# Patient Record
Sex: FEMALE | Race: WHITE | Employment: PART TIME | URBAN - METROPOLITAN AREA
[De-identification: names, ages, dates, MRNs, and addresses within clinical notes are randomized per-mention and may not be internally consistent; named-entity substitution may affect disease eponyms.]

---

## 2017-05-22 ENCOUNTER — OFFICE VISIT (OUTPATIENT)
Dept: FAMILY MEDICINE | Facility: CLINIC | Age: 47
End: 2017-05-22
Payer: COMMERCIAL

## 2017-05-22 VITALS
WEIGHT: 170.3 LBS | TEMPERATURE: 98 F | DIASTOLIC BLOOD PRESSURE: 90 MMHG | HEART RATE: 68 BPM | RESPIRATION RATE: 16 BRPM | BODY MASS INDEX: 25.22 KG/M2 | SYSTOLIC BLOOD PRESSURE: 130 MMHG | HEIGHT: 69 IN | OXYGEN SATURATION: 100 %

## 2017-05-22 DIAGNOSIS — I10 HYPERTENSION GOAL BP (BLOOD PRESSURE) < 140/90: Primary | ICD-10-CM

## 2017-05-22 DIAGNOSIS — E83.51 HYPOCALCEMIA: ICD-10-CM

## 2017-05-22 PROCEDURE — 99213 OFFICE O/P EST LOW 20 MIN: CPT | Performed by: FAMILY MEDICINE

## 2017-05-22 PROCEDURE — 36415 COLL VENOUS BLD VENIPUNCTURE: CPT | Performed by: FAMILY MEDICINE

## 2017-05-22 PROCEDURE — 80048 BASIC METABOLIC PNL TOTAL CA: CPT | Performed by: FAMILY MEDICINE

## 2017-05-22 RX ORDER — LISINOPRIL 5 MG/1
5 TABLET ORAL DAILY
Qty: 30 TABLET | Refills: 0 | Status: SHIPPED | OUTPATIENT
Start: 2017-05-22 | End: 2017-06-19

## 2017-05-22 ASSESSMENT — ANXIETY QUESTIONNAIRES
IF YOU CHECKED OFF ANY PROBLEMS ON THIS QUESTIONNAIRE, HOW DIFFICULT HAVE THESE PROBLEMS MADE IT FOR YOU TO DO YOUR WORK, TAKE CARE OF THINGS AT HOME, OR GET ALONG WITH OTHER PEOPLE: NOT DIFFICULT AT ALL
2. NOT BEING ABLE TO STOP OR CONTROL WORRYING: NOT AT ALL
GAD7 TOTAL SCORE: 3
6. BECOMING EASILY ANNOYED OR IRRITABLE: MORE THAN HALF THE DAYS
3. WORRYING TOO MUCH ABOUT DIFFERENT THINGS: NOT AT ALL
7. FEELING AFRAID AS IF SOMETHING AWFUL MIGHT HAPPEN: NOT AT ALL
5. BEING SO RESTLESS THAT IT IS HARD TO SIT STILL: NOT AT ALL
1. FEELING NERVOUS, ANXIOUS, OR ON EDGE: SEVERAL DAYS

## 2017-05-22 ASSESSMENT — PATIENT HEALTH QUESTIONNAIRE - PHQ9: 5. POOR APPETITE OR OVEREATING: NOT AT ALL

## 2017-05-22 NOTE — MR AVS SNAPSHOT
"              After Visit Summary   5/22/2017    Nanette Burnett    MRN: [id number]           Patient Information     Date Of Birth          1970        Visit Information        Provider Department      5/22/2017 3:10 PM Becca Trevizo MD [facility]        Today's Diagnoses     Hypertension goal BP (blood pressure) < 140/90    -  1      Care Instructions    Let's see you again in about 3 weeks. Contact me earlier for contacts or concerns.            Follow-ups after your visit        Who to contact     If you have questions or need follow up information about today's clinic visit or your schedule please contact [facility] directly at [phone].  Normal or non-critical lab and imaging results will be communicated to you by MyChart, letter or phone within 4 business days after the clinic has received the results. If you do not hear from us within 7 days, please contact the clinic through [employer] or phone. If you have a critical or abnormal lab result, we will notify you by phone as soon as possible.  Submit refill requests through [employer] or call your pharmacy and they will forward the refill request to us. Please allow 3 business days for your refill to be completed.          Additional Information About Your Visit        MyChart Information     [employer] gives you secure access to your electronic health record. If you see a primary care provider, you can also send messages to your care team and make appointments. If you have questions, please call your primary care clinic.  If you do not have a primary care provider, please call [phone] and they will assist you.        Care EveryWhere ID     This is your Care EveryWhere ID. This could be used by other organizations to access your [facility] medical records  [id number]        Your Vitals Were     Pulse Temperature Respirations Height Pulse Oximetry BMI (Body Mass Index)    68 98  F (36.7  C) (Oral) 16 5' 9\" (1.753 m) 100% 25.15 " kg/m2       Blood Pressure from Last 3 Encounters:   05/22/17 130/90   11/15/16 128/86   10/07/16 130/90    Weight from Last 3 Encounters:   05/22/17 170 lb 4.8 oz (77.2 kg)   10/07/16 172 lb 3.2 oz (78.1 kg)   06/01/16 166 lb (75.3 kg)              We Performed the Following     Basic metabolic panel          Today's Medication Changes          These changes are accurate as of: 5/22/17  3:52 PM.  If you have any questions, ask your nurse or doctor.               Start taking these medicines.        Dose/Directions    lisinopril 5 MG tablet   Commonly known as:  PRINIVIL/ZESTRIL   Used for:  Hypertension goal BP (blood pressure) < 140/90   Started by:  Becca Trevizo MD        Dose:  5 mg   Take 1 tablet (5 mg) by mouth daily   Quantity:  30 tablet   Refills:  0            Where to get your medicines      These medications were sent to [facility] [address] [place] [address] AT [facility] & [address]  [address] [facility] [address]     Phone:  [phone]     lisinopril 5 MG tablet                Primary Care Provider Office Phone # Fax #    Becca Trevizo [phone]       [facility] [address] [facility] [address]        Thank you!     Thank you for choosing [facility]  for your care. Our goal is always to provide you with excellent care. Hearing back from our patients is one way we can continue to improve our services. Please take a few minutes to complete the written survey that you may receive in the mail after your visit with us. Thank you!             Your Updated Medication List - Protect others around you: Learn how to safely use, store and throw away your medicines at www.disposemymeds.org.          This list is accurate as of: 5/22/17  3:52 PM.  Always use your most recent med list.                   Brand Name Dispense Instructions for use    ALLEGRA ALLERGY 180 MG tablet   Generic drug:  fexofenadine      Take  by mouth daily Reported on 5/22/2017       CRANBERRY PO      Take by mouth daily       FLUoxetine 20 MG capsule    PROZAC    90 capsule    Take 1 capsule (20 mg) by mouth daily       lisinopril 5 MG tablet    PRINIVIL/ZESTRIL    30 tablet    Take 1 tablet (5 mg) by mouth daily       phenazopyridine 200 MG tablet    PYRIDIUM     as needed       sulfamethoxazole-trimethoprim 800-160 MG per tablet    BACTRIM DS/SEPTRA DS    10 tablet    Take 1 tablet by mouth daily as needed Use after intercourse       SUMAtriptan 50 MG tablet    IMITREX    30 tablet    Take 1 tablet (50 mg) by mouth at onset of headache for migraine May repeat dose in 2 hours.  Do not exceed 200 mg in 24 hours       valACYclovir 500 MG tablet    VALTREX    24 tablet    Take 1 tablet (500 mg) by mouth 2 times daily as needed X 3 days with flare.

## 2017-05-22 NOTE — PROGRESS NOTES
SUBJECTIVE:                                                    Nanette Burnett is a 46 year old female who presents to clinic today for the following health issues:      Here to discuss higher blood pressure reading concerns.  Blood pressures have been running 127-154/.  Denies any symptoms.        Problem list and histories reviewed & adjusted, as indicated.  Additional history:   See under ROS     Patient Active Problem List   Diagnosis     Herpes simplex virus (HSV) infection     Anxiety state     Allergic state     External hemorrhoids     CARDIOVASCULAR SCREENING; LDL GOAL LESS THAN 160     Dysplasia of cervix     Migraine     Health Care Home     Anemia, unspecified type       Current Outpatient Prescriptions   Medication Sig Dispense Refill     SUMAtriptan (IMITREX) 50 MG tablet Take 1 tablet (50 mg) by mouth at onset of headache for migraine May repeat dose in 2 hours.  Do not exceed 200 mg in 24 hours 30 tablet 0     FLUoxetine (PROZAC) 20 MG capsule Take 1 capsule (20 mg) by mouth daily 90 capsule 1     valACYclovir (VALTREX) 500 MG tablet Take 1 tablet (500 mg) by mouth 2 times daily as needed X 3 days with flare. 24 tablet prn     CRANBERRY PO Take by mouth daily       sulfamethoxazole-trimethoprim (BACTRIM DS,SEPTRA DS) 800-160 MG per tablet Take 1 tablet by mouth daily as needed Use after intercourse 10 tablet 3     phenazopyridine (PYRIDIUM) 200 MG tablet as needed        fexofenadine (ALLEGRA ALLERGY) 180 MG tablet Take by mouth daily Reported on 5/22/2017               Reviewed and updated as needed this visit by clinical staff  Tobacco  Allergies  Meds  Med Hx  Surg Hx  Fam Hx  Soc Hx      Reviewed and updated as needed this visit by Provider         ROS:  CONSTITUTIONAL:NEGATIVE for fever, chills, change in weight  CV: NEGATIVE for chest pain, palpitations or peripheral edema  PSYCHIATRIC: NEGATIVE for changes in mood or affect    bp was noted to be 154/104 at an appointment  So she  "started tracking over the last couple months.      OBJECTIVE:                                                    /90 (BP Location: Right arm, Patient Position: Chair, Cuff Size: Adult Regular)  Pulse 68  Temp 98  F (36.7  C) (Oral)  Resp 16  Ht 5' 9\" (1.753 m)  Wt 170 lb 4.8 oz (77.2 kg)  SpO2 100%  BMI 25.15 kg/m2  Body mass index is 25.15 kg/(m^2).  GENERAL APPEARANCE: healthy, alert and no distress  CV: regular rates and rhythm  MS: extremities normal- no gross deformities noted  PSYCH: mentation appears normal and affect normal/bright    Hemoglobin   Date Value Ref Range Status   10/07/2016 12.6 11.7 - 15.7 g/dL Final   ]         ASSESSMENT/PLAN:                                                      Hypertension goal BP (blood pressure) < 140/90  New diagnosis.  Discussed treatment with what she has noted as well. Discussed potential lifestyle.  Discussed potential side effects, including cough and rare occurrence of angioedema. Will monitor kidney function and potassium.  - Basic metabolic panel  - lisinopril (PRINIVIL/ZESTRIL) 5 MG tablet; Take 1 tablet (5 mg) by mouth daily  - Renal panel (Alb, BUN, Ca, Cl, CO2, Creat, Gluc, Phos, K, Na); Future    Hypocalcemia  Will recheck with albumin to see if significant.  - Renal panel (Alb, BUN, Ca, Cl, CO2, Creat, Gluc, Phos, K, Na); Future      Patient Instructions   Let's see you again in about 3 weeks. Contact me earlier for contacts or concerns.          Becca Trevizo MD, MD  [facility]  "

## 2017-05-22 NOTE — NURSING NOTE
"Chief Complaint   Patient presents with     Hypertension       Initial /90 (BP Location: Right arm, Patient Position: Chair, Cuff Size: Adult Regular)  Pulse 68  Temp 98  F (36.7  C) (Oral)  Resp 16  Ht 5' 9\" (1.753 m)  Wt 170 lb 4.8 oz (77.2 kg)  SpO2 100%  BMI 25.15 kg/m2 Estimated body mass index is 25.15 kg/(m^2) as calculated from the following:    Height as of this encounter: 5' 9\" (1.753 m).    Weight as of this encounter: 170 lb 4.8 oz (77.2 kg).  Medication Reconciliation: complete   Maine Metz, BRYAN      "

## 2017-05-23 LAB
ANION GAP SERPL CALCULATED.3IONS-SCNC: 5 MMOL/L (ref 3–14)
BUN SERPL-MCNC: 13 MG/DL (ref 7–30)
CALCIUM SERPL-MCNC: 8.4 MG/DL (ref 8.5–10.1)
CHLORIDE SERPL-SCNC: 108 MMOL/L (ref 94–109)
CO2 SERPL-SCNC: 28 MMOL/L (ref 20–32)
CREAT SERPL-MCNC: 0.6 MG/DL (ref 0.52–1.04)
GFR SERPL CREATININE-BSD FRML MDRD: ABNORMAL ML/MIN/1.7M2
GLUCOSE SERPL-MCNC: 85 MG/DL (ref 70–99)
POTASSIUM SERPL-SCNC: 3.8 MMOL/L (ref 3.4–5.3)
SODIUM SERPL-SCNC: 141 MMOL/L (ref 133–144)

## 2017-05-23 ASSESSMENT — ANXIETY QUESTIONNAIRES: GAD7 TOTAL SCORE: 3

## 2017-05-23 ASSESSMENT — PATIENT HEALTH QUESTIONNAIRE - PHQ9: SUM OF ALL RESPONSES TO PHQ QUESTIONS 1-9: 2

## 2017-06-19 ENCOUNTER — OFFICE VISIT (OUTPATIENT)
Dept: FAMILY MEDICINE | Facility: CLINIC | Age: 47
End: 2017-06-19
Payer: COMMERCIAL

## 2017-06-19 VITALS
SYSTOLIC BLOOD PRESSURE: 132 MMHG | TEMPERATURE: 98 F | RESPIRATION RATE: 16 BRPM | DIASTOLIC BLOOD PRESSURE: 80 MMHG | OXYGEN SATURATION: 99 % | BODY MASS INDEX: 25 KG/M2 | WEIGHT: 168.8 LBS | HEIGHT: 69 IN | HEART RATE: 71 BPM

## 2017-06-19 DIAGNOSIS — E83.51 HYPOCALCEMIA: ICD-10-CM

## 2017-06-19 DIAGNOSIS — I10 HYPERTENSION GOAL BP (BLOOD PRESSURE) < 140/90: ICD-10-CM

## 2017-06-19 LAB
ALBUMIN SERPL-MCNC: 3.7 G/DL (ref 3.4–5)
ANION GAP SERPL CALCULATED.3IONS-SCNC: 8 MMOL/L (ref 3–14)
BUN SERPL-MCNC: 14 MG/DL (ref 7–30)
CALCIUM SERPL-MCNC: 8.5 MG/DL (ref 8.5–10.1)
CHLORIDE SERPL-SCNC: 110 MMOL/L (ref 94–109)
CO2 SERPL-SCNC: 28 MMOL/L (ref 20–32)
CREAT SERPL-MCNC: 0.88 MG/DL (ref 0.52–1.04)
GFR SERPL CREATININE-BSD FRML MDRD: 69 ML/MIN/1.7M2
GLUCOSE SERPL-MCNC: 89 MG/DL (ref 70–99)
PHOSPHATE SERPL-MCNC: 3.6 MG/DL (ref 2.5–4.5)
POTASSIUM SERPL-SCNC: 3.8 MMOL/L (ref 3.4–5.3)
SODIUM SERPL-SCNC: 146 MMOL/L (ref 133–144)

## 2017-06-19 PROCEDURE — 99213 OFFICE O/P EST LOW 20 MIN: CPT | Performed by: FAMILY MEDICINE

## 2017-06-19 PROCEDURE — 80069 RENAL FUNCTION PANEL: CPT | Performed by: FAMILY MEDICINE

## 2017-06-19 PROCEDURE — 36415 COLL VENOUS BLD VENIPUNCTURE: CPT | Performed by: FAMILY MEDICINE

## 2017-06-19 RX ORDER — LISINOPRIL 5 MG/1
5 TABLET ORAL DAILY
Qty: 90 TABLET | Refills: 1 | Status: SHIPPED | OUTPATIENT
Start: 2017-06-19 | End: 2017-09-05 | Stop reason: DRUGHIGH

## 2017-06-19 NOTE — LETTER
June 20, 2017      Nanette Burnett  [address] [place] [address]        Dear Ms. Nanette DAVID Burnett,    Enclosed is a copy of your recent results.    I do think this is fine. The sodium and chloride can vary some; these are not far out of the normal. I am not concerned; we will continue to follow.    Have a great Summer!    Sincerely,     Becca Trevizo MD

## 2017-06-19 NOTE — PROGRESS NOTES
SUBJECTIVE:                                                    Nanette Burnett is a 46 year old female who presents to clinic today for the following health issues:      Hypertension Follow-up      Outpatient blood pressures  At [facility] - averages 120-128/86-90    Low Salt Diet: no added salt       Amount of exercise or physical activity: 5 days per week - wallking    Problems taking medications regularly: No    Medication side effects: dry mouth, lathargic    Diet: regular (no restrictions)          Problem list and histories reviewed & adjusted, as indicated.  Additional history:   See under ROS     Patient Active Problem List   Diagnosis     Herpes simplex virus (HSV) infection     Anxiety state     Allergic state     External hemorrhoids     CARDIOVASCULAR SCREENING; LDL GOAL LESS THAN 160     Dysplasia of cervix     Migraine     Health Care Home     Anemia, unspecified type     Hypertension goal BP (blood pressure) < 140/90       Current Outpatient Prescriptions   Medication Sig Dispense Refill     lisinopril (PRINIVIL/ZESTRIL) 5 MG tablet Take 1 tablet (5 mg) by mouth daily 30 tablet 0     SUMAtriptan (IMITREX) 50 MG tablet Take 1 tablet (50 mg) by mouth at onset of headache for migraine May repeat dose in 2 hours.  Do not exceed 200 mg in 24 hours 30 tablet 0     FLUoxetine (PROZAC) 20 MG capsule Take 1 capsule (20 mg) by mouth daily 90 capsule 1     valACYclovir (VALTREX) 500 MG tablet Take 1 tablet (500 mg) by mouth 2 times daily as needed X 3 days with flare. 24 tablet prn     CRANBERRY PO Take by mouth daily       sulfamethoxazole-trimethoprim (BACTRIM DS,SEPTRA DS) 800-160 MG per tablet Take 1 tablet by mouth daily as needed Use after intercourse 10 tablet 3     phenazopyridine (PYRIDIUM) 200 MG tablet as needed        fexofenadine (ALLEGRA ALLERGY) 180 MG tablet Take by mouth daily Reported on 5/22/2017             Reviewed and updated as needed this visit by clinical staff  Allergies       Reviewed and  "updated as needed this visit by Provider         ROS:  CONSTITUTIONAL:NEGATIVE for fever, chills, change in weight  ENT/MOUTH: NEGATIVE for ear, mouth and throat problems x dry mouth.  RESP:NEGATIVE for significant cough or short of breath; notes she did have a cough the first couple days.   CV: no palpitations or chest pain.  PSYCHIATRIC: NEGATIVE for changes in mood or affect    Notes a dry mouth.      OBJECTIVE:                                                    /80 (BP Location: Right arm, Patient Position: Chair, Cuff Size: Adult Regular)  Pulse 71  Temp 98  F (36.7  C) (Oral)  Resp 16  Ht 5' 9\" (1.753 m)  Wt 168 lb 12.8 oz (76.6 kg)  SpO2 99%  BMI 24.93 kg/m2  Body mass index is 24.93 kg/(m^2).  GENERAL APPEARANCE: alert and no distress  RESP: lungs clear to auscultation - no rales, rhonchi or wheezes  CV: regular rates and rhythm  MS: extremities normal- no gross deformities noted; no edema.  PSYCH: mentation appears normal and affect normal/bright    Component      Latest Ref Rng & Units 5/22/2017   Sodium      133 - 144 mmol/L 141   Potassium      3.4 - 5.3 mmol/L 3.8   Chloride      94 - 109 mmol/L 108   Carbon Dioxide      20 - 32 mmol/L 28   Anion Gap      3 - 14 mmol/L 5   Glucose      70 - 99 mg/dL 85   Urea Nitrogen      7 - 30 mg/dL 13   Creatinine      0.52 - 1.04 mg/dL 0.60   GFR Estimate      >60 mL/min/1.7m2 >90 . . .   GFR Estimate If Black      >60 mL/min/1.7m2 >90 . . .   Calcium      8.5 - 10.1 mg/dL 8.4 (L)        ASSESSMENT/PLAN:                                                        Hypertension goal BP (blood pressure) < 140/90  Controlled. Will recheck in 6 months. She can come in sooner if she notes her readings to be elevated.   - lisinopril (PRINIVIL/ZESTRIL) 5 MG tablet; Take 1 tablet (5 mg) by mouth daily  - Renal panel (Alb, BUN, Ca, Cl, CO2, Creat, Gluc, Phos, K, Na)    Hypocalcemia  Discussed this is most likely normal. Discussed it is protein bound. Will check with " albumin so we can do correction if abnormal.   Did review it does not reflect dietary intake.  - Renal panel (Alb, BUN, Ca, Cl, CO2, Creat, Gluc, Phos, K, Na)        Patient Instructions   If all is looking good, I would like to see you again in 6 months. Earlier for concerns.          Becca Trevizo MD, MD  [facility]

## 2017-06-19 NOTE — MR AVS SNAPSHOT
"              After Visit Summary   6/19/2017    Nanette Burnett    MRN: [id number]           Patient Information     Date Of Birth          1970        Visit Information        Provider Department      6/19/2017 2:50 PM Becca Trevizo MD [facility]        Today's Diagnoses     Hypertension goal BP (blood pressure) < 140/90        Hypocalcemia          Care Instructions    If all is looking good, I would like to see you again in 6 months. Earlier for concerns.              Follow-ups after your visit        Who to contact     If you have questions or need follow up information about today's clinic visit or your schedule please contact [facility] directly at [phone].  Normal or non-critical lab and imaging results will be communicated to you by MyChart, letter or phone within 4 business days after the clinic has received the results. If you do not hear from us within 7 days, please contact the clinic through MyChart or phone. If you have a critical or abnormal lab result, we will notify you by phone as soon as possible.  Submit refill requests through [employer] or call your pharmacy and they will forward the refill request to us. Please allow 3 business days for your refill to be completed.          Additional Information About Your Visit        Care EveryWhere ID     This is your Care EveryWhere ID. This could be used by other organizations to access your [facility] medical records  [id number]        Your Vitals Were     Pulse Temperature Respirations Height Pulse Oximetry BMI (Body Mass Index)    71 98  F (36.7  C) (Oral) 16 5' 9\" (1.753 m) 99% 24.93 kg/m2       Blood Pressure from Last 3 Encounters:   06/19/17 132/80   05/22/17 130/90   11/15/16 128/86    Weight from Last 3 Encounters:   06/19/17 168 lb 12.8 oz (76.6 kg)   05/22/17 170 lb 4.8 oz (77.2 kg)   10/07/16 172 lb 3.2 oz (78.1 kg)              We Performed the Following     Renal panel (Alb, BUN, Ca, Cl, CO2, Creat, " Gluc, Phos, K, Na)          Where to get your medicines      These medications were sent to [facility] - [place], [place] - [address] AT [facility] [place] [address]  [address], [facility] [address]     Phone:  [phone]     lisinopril 5 MG tablet          Primary Care Provider Office Phone # Fax #    Becca Trevizo [phone]       [facility] [address]  [facility] [address]        Thank you!     Thank you for choosing [facility]  for your care. Our goal is always to provide you with excellent care. Hearing back from our patients is one way we can continue to improve our services. Please take a few minutes to complete the written survey that you may receive in the mail after your visit with us. Thank you!             Your Updated Medication List - Protect others around you: Learn how to safely use, store and throw away your medicines at www.disposemymeds.org.          This list is accurate as of: 6/19/17  3:21 PM.  Always use your most recent med list.                   Brand Name Dispense Instructions for use    ALLEGRA ALLERGY 180 MG tablet   Generic drug:  fexofenadine      Take by mouth daily Reported on 5/22/2017       CRANBERRY PO      Take by mouth daily       FLUoxetine 20 MG capsule    PROZAC    90 capsule    Take 1 capsule (20 mg) by mouth daily       lisinopril 5 MG tablet    PRINIVIL/ZESTRIL    90 tablet    Take 1 tablet (5 mg) by mouth daily       phenazopyridine 200 MG tablet    PYRIDIUM     as needed       sulfamethoxazole-trimethoprim 800-160 MG per tablet    BACTRIM DS/SEPTRA DS    10 tablet    Take 1 tablet by mouth daily as needed Use after intercourse       SUMAtriptan 50 MG tablet    IMITREX    30 tablet    Take 1 tablet (50 mg) by mouth at onset of headache for migraine May repeat dose in 2 hours.  Do not exceed 200 mg in 24 hours       valACYclovir 500 MG tablet    VALTREX    24 tablet    Take 1 tablet (500  mg) by mouth 2 times daily as needed X 3 days with flare.

## 2017-06-19 NOTE — NURSING NOTE
"Chief Complaint   Patient presents with     Hypertension       Initial /80 (BP Location: Right arm, Patient Position: Chair, Cuff Size: Adult Regular)  Pulse 71  Temp 98  F (36.7  C) (Oral)  Resp 16  Ht 5' 9\" (1.753 m)  Wt 168 lb 12.8 oz (76.6 kg)  SpO2 99%  BMI 24.93 kg/m2 Estimated body mass index is 24.93 kg/(m^2) as calculated from the following:    Height as of this encounter: 5' 9\" (1.753 m).    Weight as of this encounter: 168 lb 12.8 oz (76.6 kg).  Medication Reconciliation: complete   Maine Metz, BRYAN      "

## 2017-08-10 DIAGNOSIS — N39.0 FREQUENT UTI: ICD-10-CM

## 2017-08-11 RX ORDER — SULFAMETHOXAZOLE/TRIMETHOPRIM 800-160 MG
TABLET ORAL
Qty: 10 TABLET | Refills: 0 | Status: SHIPPED | OUTPATIENT
Start: 2017-08-11 | End: 2018-08-02

## 2017-08-11 NOTE — TELEPHONE ENCOUNTER
sulfamethoxazole-trimethoprim (BACTRIM DS,SEPTRA DS) 800-160 MG per tablet      Last Written Prescription Date:  6/1/2016  Last Fill Quantity: 10,   # refills: 3  Last Office Visit with [facility] or [facility] prescribing provider: 6/19/2017  Future Office visit:       Routing refill request to provider for review/approval because:  Drug not on the [facility], [facility] or  [facility] refill protocol or controlled substance

## 2017-08-11 NOTE — TELEPHONE ENCOUNTER
Routing refill request to provider for review/approval because:  Diagnosis is not per protocol, patient takes for frequent UTI.  Yaima Martinez, SAJI  Triage Nurse

## 2017-09-05 ENCOUNTER — OFFICE VISIT (OUTPATIENT)
Dept: FAMILY MEDICINE | Facility: CLINIC | Age: 47
End: 2017-09-05
Payer: COMMERCIAL

## 2017-09-05 VITALS
RESPIRATION RATE: 16 BRPM | HEIGHT: 69 IN | DIASTOLIC BLOOD PRESSURE: 98 MMHG | TEMPERATURE: 97.9 F | SYSTOLIC BLOOD PRESSURE: 152 MMHG | OXYGEN SATURATION: 100 % | BODY MASS INDEX: 25.48 KG/M2 | WEIGHT: 172 LBS | HEART RATE: 69 BPM

## 2017-09-05 DIAGNOSIS — I10 HYPERTENSION GOAL BP (BLOOD PRESSURE) < 140/90: Primary | ICD-10-CM

## 2017-09-05 PROCEDURE — 99213 OFFICE O/P EST LOW 20 MIN: CPT | Performed by: FAMILY MEDICINE

## 2017-09-05 RX ORDER — LISINOPRIL 10 MG/1
10 TABLET ORAL DAILY
Qty: 90 TABLET | Refills: 0 | Status: SHIPPED | OUTPATIENT
Start: 2017-09-05 | End: 2017-12-15

## 2017-09-05 NOTE — MR AVS SNAPSHOT
"              After Visit Summary   9/5/2017    Nanette Burnett    MRN: [id number]           Patient Information     Date Of Birth          1970        Visit Information        Provider Department      9/5/2017 3:30 PM Becca Trevizo MD [facility]        Today's Diagnoses     Hypertension goal BP (blood pressure) < 140/90    -  1      Care Instructions    Increase lisinopril to 10 mg daily.    You can take 2 of your current medication to use them up.  I did send in a 10 mg pill.    I would like to see you in 3-4 weeks.          Follow-ups after your visit        Follow-up notes from your care team     Return in about 4 weeks (around 10/3/2017).      Who to contact     If you have questions or need follow up information about today's clinic visit or your schedule please contact [facility] directly at [phone].  Normal or non-critical lab and imaging results will be communicated to you by MyChart, letter or phone within 4 business days after the clinic has received the results. If you do not hear from us within 7 days, please contact the clinic through MyChart or phone. If you have a critical or abnormal lab result, we will notify you by phone as soon as possible.  Submit refill requests through MyTinks or call your pharmacy and they will forward the refill request to us. Please allow 3 business days for your refill to be completed.          Additional Information About Your Visit        Care EveryWhere ID     This is your Care EveryWhere ID. This could be used by other organizations to access your [place] medical records  [id number]        Your Vitals Were     Pulse Temperature Respirations Height Pulse Oximetry BMI (Body Mass Index)    69 97.9  F (36.6  C) (Oral) 16 5' 9\" (1.753 m) 100% 25.4 kg/m2       Blood Pressure from Last 3 Encounters:   09/05/17 (!) 152/98   06/19/17 132/80   05/22/17 130/90    Weight from Last 3 Encounters:   09/05/17 172 lb (78 kg)   06/19/17 168 lb " 12.8 oz (76.6 kg)   05/22/17 170 lb 4.8 oz (77.2 kg)              Today, you had the following     No orders found for display         Today's Medication Changes          These changes are accurate as of: 9/5/17  4:56 PM.  If you have any questions, ask your nurse or doctor.               These medicines have changed or have updated prescriptions.        Dose/Directions    lisinopril 10 MG tablet   Commonly known as:  PRINIVIL/ZESTRIL   This may have changed:    - medication strength  - how much to take   Used for:  Hypertension goal BP (blood pressure) < 140/90   Changed by:  Becca Trevizo MD        Dose:  10 mg   Take 1 tablet (10 mg) by mouth daily   Quantity:  90 tablet   Refills:  0            Where to get your medicines      These medications were sent to [facility] [id number] [place] [address] [address]  [facility] [address]  [address] [facility] [address]     Phone:  [phone]     lisinopril 10 MG tablet                Primary Care Provider Office Phone # Fax #    Becca Trevizo [phone]       [address] [facility] [address]        Equal Access to Services     RENAE BRIGGS AH: Hadii aad ku hadasho Soomaali, waaxda luqadaha, qaybta kaalmada adeegyada, waxay idiin hayaan lcayton velazquez. So [facility] [phone].    ATENCIÓN: Si habla español, tiene a garcia disposición servicios gratuitos de asistencia lingüística. Llame al [phone].    We comply with applicable federal civil rights laws and [place] laws. We do not discriminate on the basis of race, color, national origin, age, disability sex, sexual orientation or gender identity.            Thank you!     Thank you for choosing [facility]  for your care. Our goal is always to provide you with excellent care. Hearing back from our patients is one way we can continue to improve our services. Please take a few minutes to complete the written survey that you may receive in the mail  after your visit with us. Thank you!             Your Updated Medication List - Protect others around you: Learn how to safely use, store and throw away your medicines at www.disposemymeds.org.          This list is accurate as of: 9/5/17  4:56 PM.  Always use your most recent med list.                   Brand Name Dispense Instructions for use Diagnosis    ALLEGRA ALLERGY 180 MG tablet   Generic drug:  fexofenadine      Take by mouth daily Reported on 5/22/2017        CRANBERRY PO      Take by mouth daily        FLUoxetine 20 MG capsule    PROZAC    90 capsule    Take 1 capsule (20 mg) by mouth daily    Anxiety state       lisinopril 10 MG tablet    PRINIVIL/ZESTRIL    90 tablet    Take 1 tablet (10 mg) by mouth daily    Hypertension goal BP (blood pressure) < 140/90       phenazopyridine 200 MG tablet    PYRIDIUM     as needed        sulfamethoxazole-trimethoprim 800-160 MG per tablet    BACTRIM DS/SEPTRA DS    10 tablet    TAKE 1 TABLET BY MOUTH ONCE DAILY AS NEEDED FOR AFTER INTERCOURSE.    Frequent UTI       SUMAtriptan 50 MG tablet    IMITREX    30 tablet    Take 1 tablet (50 mg) by mouth at onset of headache for migraine May repeat dose in 2 hours.  Do not exceed 200 mg in 24 hours    Migraine with aura and without status migrainosus, not intractable       valACYclovir 500 MG tablet    VALTREX    24 tablet    Take 1 tablet (500 mg) by mouth 2 times daily as needed X 3 days with flare.    Herpes simplex virus (HSV) infection

## 2017-09-05 NOTE — NURSING NOTE
"Chief Complaint   Patient presents with     Hypertension     high reading       Initial BP (!) 152/98 (BP Location: Right arm, Cuff Size: Adult Regular)  Pulse 69  Temp 97.9  F (36.6  C) (Oral)  Resp 16  Ht 5' 9\" (1.753 m)  Wt 172 lb (78 kg)  SpO2 100%  BMI 25.4 kg/m2 Estimated body mass index is 25.4 kg/(m^2) as calculated from the following:    Height as of this encounter: 5' 9\" (1.753 m).    Weight as of this encounter: 172 lb (78 kg).  Medication Reconciliation: complete   Maine Metz, BRYAN    "

## 2017-09-05 NOTE — PATIENT INSTRUCTIONS
Increase lisinopril to 10 mg daily.    You can take 2 of your current medication to use them up.  I did send in a 10 mg pill.    I would like to see you in 3-4 weeks.

## 2017-11-27 ENCOUNTER — ALLIED HEALTH/NURSE VISIT (OUTPATIENT)
Dept: NURSING | Facility: CLINIC | Age: 47
End: 2017-11-27
Payer: COMMERCIAL

## 2017-11-27 ENCOUNTER — RADIANT APPOINTMENT (OUTPATIENT)
Dept: MAMMOGRAPHY | Facility: CLINIC | Age: 47
End: 2017-11-27
Payer: COMMERCIAL

## 2017-11-27 DIAGNOSIS — Z23 NEED FOR PROPHYLACTIC VACCINATION AND INOCULATION AGAINST INFLUENZA: Primary | ICD-10-CM

## 2017-11-27 DIAGNOSIS — Z12.31 VISIT FOR SCREENING MAMMOGRAM: ICD-10-CM

## 2017-11-27 PROCEDURE — G0202 SCR MAMMO BI INCL CAD: HCPCS | Mod: TC

## 2017-11-27 PROCEDURE — 99207 ZZC NO CHARGE NURSE ONLY: CPT

## 2017-11-27 PROCEDURE — 90686 IIV4 VACC NO PRSV 0.5 ML IM: CPT

## 2017-11-27 PROCEDURE — 90471 IMMUNIZATION ADMIN: CPT

## 2017-11-27 NOTE — PROGRESS NOTES

## 2017-11-27 NOTE — MR AVS SNAPSHOT
"              After Visit Summary   2017    Nanette Burnett    MRN: [id number]           Patient Information     Date Of Birth          1970        Visit Information        Provider Department      2017 9:45 AM CR GOLD MA/LPN [facility]        Today's Diagnoses     Need for prophylactic vaccination and inoculation against influenza    -  1       Follow-ups after your visit        Who to contact     If you have questions or need follow up information about today's clinic visit or your schedule please contact [facility] directly at [phone].  Normal or non-critical lab and imaging results will be communicated to you by ServerEngineshart, letter or phone within 4 business days after the clinic has received the results. If you do not hear from us within 7 days, please contact the clinic through [employer] or phone. If you have a critical or abnormal lab result, we will notify you by phone as soon as possible.  Submit refill requests through [employer] or call your pharmacy and they will forward the refill request to us. Please allow 3 business days for your refill to be completed.          Additional Information About Your Visit        MyChart Information     EnergyDe[employer] lets you send messages to your doctor, view your test results, renew your prescriptions, schedule appointments and more. To sign up, go to [email or web address] . Click on \"Log in\" on the left side of the screen, which will take you to the Welcome page. Then click on \"Sign up Now\" on the right side of the page.     You will be asked to enter the access code listed below, as well as some personal information. Please follow the directions to create your username and password.     Your access code is: [email or web address]  Expires: 2018  9:49 AM     Your access code will  in 90 days. If you need help or a new code, please call your [facility] or [phone].        Care EveryWhere ID     This is your Care " EveryWhere ID. This could be used by other organizations to access your [place] medical records  [id number]         Blood Pressure from Last 3 Encounters:   09/05/17 (!) 152/98   06/19/17 132/80   05/22/17 130/90    Weight from Last 3 Encounters:   09/05/17 172 lb (78 kg)   06/19/17 168 lb 12.8 oz (76.6 kg)   05/22/17 170 lb 4.8 oz (77.2 kg)              We Performed the Following     FLU VAC, SPLIT VIRUS IM > 3 YO (QUADRIVALENT) [09899]     Vaccine Administration, Initial [94332]        Primary Care Provider Office Phone # Fax #    Becca Trevizo [phone] [address] [place] [address]        Equal Access to Services     [facility] BRIGITTE : Hadii jaren galvan hadasho Soomaali, waaxda luqadaha, qaybta kaalmada adeegyada, lloyd berumen . So [facility] [phone].    ATENCIÓN: Si habla español, tiene a garcia disposición servicios gratuitos de asistencia lingüística. Isa al [phone].    We comply with applicable federal civil rights laws and [place] laws. We do not discriminate on the basis of race, color, national origin, age, disability, sex, sexual orientation, or gender identity.            Thank you!     Thank you for choosing [facility]  for your care. Our goal is always to provide you with excellent care. Hearing back from our patients is one way we can continue to improve our services. Please take a few minutes to complete the written survey that you may receive in the mail after your visit with us. Thank you!             Your Updated Medication List - Protect others around you: Learn how to safely use, store and throw away your medicines at www.disposemymeds.org.          This list is accurate as of: 11/27/17  9:49 AM.  Always use your most recent med list.                   Brand Name Dispense Instructions for use Diagnosis    ALLEGRA ALLERGY 180 MG tablet   Generic drug:  fexofenadine      Take by mouth daily Reported on 5/22/2017         CRANBERRY PO      Take by mouth daily        FLUoxetine 20 MG capsule    PROZAC    90 capsule    Take 1 capsule (20 mg) by mouth daily    Anxiety state       lisinopril 10 MG tablet    PRINIVIL/ZESTRIL    90 tablet    Take 1 tablet (10 mg) by mouth daily    Hypertension goal BP (blood pressure) < 140/90       phenazopyridine 200 MG tablet    PYRIDIUM     as needed        sulfamethoxazole-trimethoprim 800-160 MG per tablet    BACTRIM DS/SEPTRA DS    10 tablet    TAKE 1 TABLET BY MOUTH ONCE DAILY AS NEEDED FOR AFTER INTERCOURSE.    Frequent UTI       SUMAtriptan 50 MG tablet    IMITREX    30 tablet    Take 1 tablet (50 mg) by mouth at onset of headache for migraine May repeat dose in 2 hours.  Do not exceed 200 mg in 24 hours    Migraine with aura and without status migrainosus, not intractable       valACYclovir 500 MG tablet    VALTREX    24 tablet    Take 1 tablet (500 mg) by mouth 2 times daily as needed X 3 days with flare.    Herpes simplex virus (HSV) infection

## 2017-12-15 DIAGNOSIS — F41.1 ANXIETY STATE: ICD-10-CM

## 2017-12-15 DIAGNOSIS — G43.109 MIGRAINE WITH AURA AND WITHOUT STATUS MIGRAINOSUS, NOT INTRACTABLE: ICD-10-CM

## 2017-12-15 DIAGNOSIS — I10 HYPERTENSION GOAL BP (BLOOD PRESSURE) < 140/90: ICD-10-CM

## 2017-12-15 NOTE — TELEPHONE ENCOUNTER
Requested Prescriptions   Pending Prescriptions Disp Refills     lisinopril (PRINIVIL/ZESTRIL) 10 MG tablet [Pharmacy Med Name: LISINOPRIL 10MG TABLETS]  Last Written Prescription Date:  9/5/17  Last Fill Quantity: 90,  # refills: 0   Last Office Visit with [facility], [facility] or [facility] prescribing provider:  9/5/17   Future Office Visit:      90 tablet 0     Sig: TAKE 1 TABLET(10 MG) BY MOUTH DAILY    ACE Inhibitors (Including Combos) Protocol Passed    12/15/2017  7:41 AM       Passed - Blood pressure under 140/90    BP Readings from Last 3 Encounters:   09/05/17 (!) 152/98   06/19/17 132/80   05/22/17 130/90                Passed - Recent or future visit with authorizing provider's specialty    Patient had office visit in the last year or has a visit in the next 30 days with authorizing provider.  See chart review.              Passed - Patient is age 18 or older       Passed - No active pregnancy on record       Passed - Normal serum creatinine on file in past 12 months    Recent Labs   Lab Test  06/19/17   1522   CR  0.88            Passed - Normal serum potassium on file in past 12 months    Recent Labs   Lab Test  06/19/17   1522   POTASSIUM  3.8            Passed - No positive pregnancy test in past 12 months        FLUoxetine (PROZAC) 20 MG capsule [Pharmacy Med Name: FLUOXETINE 20MG CAPSULES]  Last Written Prescription Date:  10/7/16  Last Fill Quantity: 90,  # refills: 1   Last Office Visit with [facility], [facility] or [facility] prescribing provider:  9/5/17   Future Office Visit:      90 capsule 0     Sig: TAKE 1 CAPSULE BY MOUTH DAILY    SSRIs Protocol Passed    12/15/2017  7:41 AM       Passed - Recent or future visit with authorizing provider    Patient had office visit in the last year or has a visit in the next 30 days with authorizing provider.  See chart review.              Passed - Medication is NOT Bupropion    If the medication is Bupropion (Wellbutrin), and the patient is taking for smoking cessation; OK to  refill.         Passed - Patient is age 18 or older       Passed - No active pregnancy on record       Passed - No positive pregnancy test in last 12 months

## 2017-12-16 ENCOUNTER — HEALTH MAINTENANCE LETTER (OUTPATIENT)
Age: 47
End: 2017-12-16

## 2017-12-19 ENCOUNTER — OFFICE VISIT (OUTPATIENT)
Dept: FAMILY MEDICINE | Facility: CLINIC | Age: 47
End: 2017-12-19
Payer: COMMERCIAL

## 2017-12-19 VITALS
OXYGEN SATURATION: 100 % | HEART RATE: 70 BPM | DIASTOLIC BLOOD PRESSURE: 100 MMHG | RESPIRATION RATE: 16 BRPM | HEIGHT: 69 IN | TEMPERATURE: 97.9 F | WEIGHT: 163.5 LBS | SYSTOLIC BLOOD PRESSURE: 142 MMHG | BODY MASS INDEX: 24.22 KG/M2

## 2017-12-19 DIAGNOSIS — I10 HYPERTENSION GOAL BP (BLOOD PRESSURE) < 140/90: ICD-10-CM

## 2017-12-19 DIAGNOSIS — F41.1 ANXIETY STATE: Primary | ICD-10-CM

## 2017-12-19 DIAGNOSIS — N95.1 PERIMENOPAUSE: ICD-10-CM

## 2017-12-19 PROCEDURE — 80048 BASIC METABOLIC PNL TOTAL CA: CPT | Performed by: FAMILY MEDICINE

## 2017-12-19 PROCEDURE — 99214 OFFICE O/P EST MOD 30 MIN: CPT | Performed by: FAMILY MEDICINE

## 2017-12-19 PROCEDURE — 36415 COLL VENOUS BLD VENIPUNCTURE: CPT | Performed by: FAMILY MEDICINE

## 2017-12-19 RX ORDER — SUMATRIPTAN 50 MG/1
50 TABLET, FILM COATED ORAL
Qty: 30 TABLET | Refills: 0 | Status: SHIPPED | OUTPATIENT
Start: 2017-12-19 | End: 2019-02-03

## 2017-12-19 RX ORDER — LORAZEPAM 0.5 MG/1
.25-.5 TABLET ORAL EVERY 8 HOURS PRN
Qty: 10 TABLET | Refills: 0 | Status: SHIPPED | OUTPATIENT
Start: 2017-12-19 | End: 2018-04-09

## 2017-12-19 RX ORDER — LISINOPRIL 10 MG/1
TABLET ORAL
Qty: 90 TABLET | Refills: 0 | Status: SHIPPED | OUTPATIENT
Start: 2017-12-19 | End: 2017-12-19 | Stop reason: DRUGHIGH

## 2017-12-19 RX ORDER — LISINOPRIL 20 MG/1
20 TABLET ORAL DAILY
Qty: 90 TABLET | Refills: 0 | Status: SHIPPED | OUTPATIENT
Start: 2017-12-19 | End: 2018-03-22

## 2017-12-19 ASSESSMENT — ANXIETY QUESTIONNAIRES
7. FEELING AFRAID AS IF SOMETHING AWFUL MIGHT HAPPEN: NOT AT ALL
1. FEELING NERVOUS, ANXIOUS, OR ON EDGE: MORE THAN HALF THE DAYS
5. BEING SO RESTLESS THAT IT IS HARD TO SIT STILL: SEVERAL DAYS
3. WORRYING TOO MUCH ABOUT DIFFERENT THINGS: MORE THAN HALF THE DAYS
2. NOT BEING ABLE TO STOP OR CONTROL WORRYING: MORE THAN HALF THE DAYS
GAD7 TOTAL SCORE: 11
6. BECOMING EASILY ANNOYED OR IRRITABLE: NEARLY EVERY DAY
IF YOU CHECKED OFF ANY PROBLEMS ON THIS QUESTIONNAIRE, HOW DIFFICULT HAVE THESE PROBLEMS MADE IT FOR YOU TO DO YOUR WORK, TAKE CARE OF THINGS AT HOME, OR GET ALONG WITH OTHER PEOPLE: SOMEWHAT DIFFICULT

## 2017-12-19 ASSESSMENT — PATIENT HEALTH QUESTIONNAIRE - PHQ9
SUM OF ALL RESPONSES TO PHQ QUESTIONS 1-9: 11
5. POOR APPETITE OR OVEREATING: SEVERAL DAYS

## 2017-12-19 NOTE — PROGRESS NOTES
SUBJECTIVE:   Nanette Burnett is a 47 year old female who presents to clinic today for the following health issues:      Anxiety Follow-Up    Status since last visit: Worsened - would like to discuss getting more Ativan.     Other associated symptoms:panic attacks, heart palpitations    Complicating factors:   Significant life event: Yes-  Work stressors   Current substance abuse: None  Depression symptoms: Yes-  More emotional, irritability   HAO-7 SCORE 10/21/2014 9/10/2015 5/22/2017   Total Score 3 - -   Total Score - 3 3       GAD7              Amount of exercise or physical activity: walking on treadmill daily    Problems taking medications regularly: No    Medication side effects: none    Diet: regular (no restrictions)            Problem list and histories reviewed & adjusted, as indicated.  Additional history:     See under ROS     Patient Active Problem List   Diagnosis     Herpes simplex virus (HSV) infection     Anxiety state     Allergic state     External hemorrhoids     CARDIOVASCULAR SCREENING; LDL GOAL LESS THAN 160     Dysplasia of cervix     Migraine     Health Care Home     Anemia, unspecified type     Hypertension goal BP (blood pressure) < 140/90       Current Outpatient Prescriptions   Medication Sig Dispense Refill     lisinopril (PRINIVIL/ZESTRIL) 10 MG tablet TAKE 1 TABLET(10 MG) BY MOUTH DAILY 90 tablet 0     FLUoxetine (PROZAC) 20 MG capsule TAKE 1 CAPSULE BY MOUTH DAILY 90 capsule 0     SUMAtriptan (IMITREX) 50 MG tablet Take 1 tablet (50 mg) by mouth at onset of headache for migraine May repeat dose in 2 hours.  Do not exceed 200 mg in 24 hours 30 tablet 0     sulfamethoxazole-trimethoprim (BACTRIM DS/SEPTRA DS) 800-160 MG per tablet TAKE 1 TABLET BY MOUTH ONCE DAILY AS NEEDED FOR AFTER INTERCOURSE. 10 tablet 0     valACYclovir (VALTREX) 500 MG tablet Take 1 tablet (500 mg) by mouth 2 times daily as needed X 3 days with flare. 24 tablet prn     CRANBERRY PO Take by mouth daily        "phenazopyridine (PYRIDIUM) 200 MG tablet as needed        fexofenadine (ALLEGRA ALLERGY) 180 MG tablet Take by mouth daily Reported on 5/22/2017           Reviewed and updated as needed this visit by clinical staffAllergies       Reviewed and updated as needed this visit by Provider         ROS:  CONSTITUTIONAL:NEGATIVE for fever, chills, change in weight  CV: NEGATIVE for chest pain, palpitations or peripheral edema  MUSCULOSKELETAL: no edema.  PSYCHIATRIC: see below    Work stress.   Tearful today at work.  Ups and downs.    She also notes she has been iIrritable with kids.    ?hormonal imbalance.  She has noted that this can be worse prior to her menses.     In 2014; got a couple ativan from friend. Has used a couple in the last couple weeks and it helps.     Menses in early September. Then end of October. Now.       OBJECTIVE:     BP (!) 142/100 (BP Location: Right arm, Cuff Size: Adult Regular)  Pulse 70  Temp 97.9  F (36.6  C) (Oral)  Resp 16  Ht 5' 9\" (1.753 m)  Wt 163 lb 8 oz (74.2 kg)  SpO2 100%  BMI 24.14 kg/m2  Body mass index is 24.14 kg/(m^2).  GENERAL APPEARANCE: alert and no distress  CV: regular rates and rhythm  MS: no edema.   PSYCH: mentation appears normal and affect normal/stressed, a little tearful.     PHQ-9 SCORE 9/10/2015 5/22/2017 12/19/2017   Total Score - - -   Total Score MyChart - - -   Total Score 2 2 11       HAO-7 SCORE 9/10/2015 5/22/2017 12/19/2017   Total Score - - -   Total Score 3 3 11     TSH   Date Value Ref Range Status   11/14/2012 1.48 0.4 - 5.0 mU/L Final   ]          ASSESSMENT/PLAN:     Anxiety state  Discussed options.  Encourage counseling.  Discussed increase medication. Discussed SSRI often works for hormonally related mood concerns as well. She was not real interested in increasing, but as I discussed some of the rationale with above around luteal phase dosing with PMS, she did want to try this. May be difficult to know when the luteal phase is; she might " need to start when feeling other things that show menses coming.  Did give a few ativans to use if really needed. Discussed potential side effects, including drowsiness, impairment, addiciton.  - FLUoxetine (PROZAC) 20 MG capsule; Take 1 capsule (20 mg) by mouth daily And increase to 40 (2x20 mg) mg during the premenstrual period of time.  - LORazepam (ATIVAN) 0.5 MG tablet; Take 0.5-1 tablets (0.25-0.5 mg) by mouth every 8 hours as needed for anxiety  - MENTAL HEALTH REFERRAL  - Adult; Outpatient Treatment; Individual/Couples/Family/Group Therapy/Health Psychology; Other: [facility] [phone]; We will contact you to schedule the appointment or please call with any questi...    Perimenopause  As above.  Discussed I am not aware of bioidentical hormones, etc.   Discussed there is limitation of evidence.     Hypertension goal BP (blood pressure) < 140/90  Increasing lisinopril. Follow up in about a month   - Basic metabolic panel  - lisinopril (PRINIVIL/ZESTRIL) 20 MG tablet; Take 1 tablet (20 mg) by mouth daily        Follow up in a month or so; may need to have some time to see if the increase fluoxetine might help.          Becca Trevizo MD, MD  [facility]

## 2017-12-19 NOTE — MR AVS SNAPSHOT
After Visit Summary   12/19/2017    Nanette Burnett    MRN: [id number]           Patient Information     Date Of Birth          1970        Visit Information        Provider Department      12/19/2017 4:50 PM Becca Trevizo MD [facility]        Today's Diagnoses     Hypertension goal BP (blood pressure) < 140/90    -  1    Anxiety state           Follow-ups after your visit        Additional Services     MENTAL HEALTH REFERRAL  - Adult; Outpatient Treatment; Individual/Couples/Family/Group Therapy/Health Psychology; Other: Behavioral Healthcare Providers [phone]; We will contact you to schedule the appointment or please call with any questi...       All scheduling is subject to the client's specific insurance plan & benefits, provider/location availability, and provider clinical specialities.  Please arrive 15 minutes early for your first appointment and bring your completed paperwork.    Please be aware that coverage of these services is subject to the terms and limitations of your health insurance plan.  Call member services at your health plan with any benefit or coverage questions.                            Who to contact     If you have questions or need follow up information about today's clinic visit or your schedule please contact [facility] directly at [phone].  Normal or non-critical lab and imaging results will be communicated to you by MyChart, letter or phone within 4 business days after the clinic has received the results. If you do not hear from us within 7 days, please contact the clinic through MyChart or phone. If you have a critical or abnormal lab result, we will notify you by phone as soon as possible.  Submit refill requests through [employer] or call your pharmacy and they will forward the refill request to us. Please allow 3 business days for your refill to be completed.          Additional Information About Your Visit        MyChart  "Information     [employer] lets you send messages to your doctor, view your test results, renew your prescriptions, schedule appointments and more. To sign up, go to [email or web address] . Click on \"Log in\" on the left side of the screen, which will take you to the Welcome page. Then click on \"Sign up Now\" on the right side of the page.     You will be asked to enter the access code listed below, as well as some personal information. Please follow the directions to create your username and password.     Your access code is: [email or web address]  Expires: 2018  9:49 AM     Your access code will  in 90 days. If you need help or a new code, please call your [place] clinic or [phone].        Care EveryWhere ID     This is your Care EveryWhere ID. This could be used by other organizations to access your [place] medical records  [id number]        Your Vitals Were     Pulse Temperature Respirations Height Pulse Oximetry BMI (Body Mass Index)    70 97.9  F (36.6  C) (Oral) 16 5' 9\" (1.753 m) 100% 24.14 kg/m2       Blood Pressure from Last 3 Encounters:   17 (!) 142/100   17 (!) 152/98   17 132/80    Weight from Last 3 Encounters:   17 163 lb 8 oz (74.2 kg)   17 172 lb (78 kg)   17 168 lb 12.8 oz (76.6 kg)              We Performed the Following     Basic metabolic panel     MENTAL HEALTH REFERRAL  - Adult; Outpatient Treatment; Individual/Couples/Family/Group Therapy/Health Psychology; Other: Behavioral Healthcare Providers [phone]; We will contact you to schedule the appointment or please call with any questi...          Today's Medication Changes          These changes are accurate as of: 17  5:43 PM.  If you have any questions, ask your nurse or doctor.               Start taking these medicines.        Dose/Directions    LORazepam 0.5 MG tablet   Commonly known as:  ATIVAN   Used for:  Anxiety state        Dose:  0.25-0.5 mg   Take 0.5-1 tablets (0.25-0.5 mg) " by mouth every 8 hours as needed for anxiety   Quantity:  10 tablet   Refills:  0         These medicines have changed or have updated prescriptions.        Dose/Directions    FLUoxetine 20 MG capsule   Commonly known as:  PROzac   This may have changed:  See the new instructions.   Used for:  Anxiety state        Dose:  20 mg   Take 1 capsule (20 mg) by mouth daily And increase to 40 (2x20 mg) mg during the premenstrual period of time.   Quantity:  120 capsule   Refills:  0       * lisinopril 10 MG tablet   Commonly known as:  PRINIVIL/ZESTRIL   This may have changed:  Another medication with the same name was added. Make sure you understand how and when to take each.   Used for:  Hypertension goal BP (blood pressure) < 140/90        TAKE 1 TABLET(10 MG) BY MOUTH DAILY   Quantity:  90 tablet   Refills:  0       * lisinopril 20 MG tablet   Commonly known as:  PRINIVIL/ZESTRIL   This may have changed:  You were already taking a medication with the same name, and this prescription was added. Make sure you understand how and when to take each.   Used for:  Hypertension goal BP (blood pressure) < 140/90        Dose:  20 mg   Take 1 tablet (20 mg) by mouth daily   Quantity:  90 tablet   Refills:  0       * Notice:  This list has 2 medication(s) that are the same as other medications prescribed for you. Read the directions carefully, and ask your doctor or other care provider to review them with you.         Where to get your medicines      [facility] [id number] [place] [address] [address] AT [facility] [address] [facility] [address]     Phone:  [phone]     FLUoxetine 20 MG capsule    lisinopril 20 MG tablet         Some of these will need a paper prescription and others can be bought over the counter.  Ask your nurse if you have questions.     Bring a paper prescription for each of these medications     LORazepam 0.5 MG tablet                 Primary Care Provider Office Phone # Fax #    Becca Trevizo [phone]       [address]  [facility] [address]        Equal Access to Services     RENAE BRIGITTE : Hadii jaren ku irvino Soteresaali, waaxda luqadaha, qaybta kaalmada adeegyada, lloyd rondonrosaura marcus. So [facility] [phone].    ATENCIÓN: Si habla español, tiene a garcia disposición servicios gratuitos de asistencia lingüística. Llame al [phone].    We comply with applicable federal civil rights laws and [place] laws. We do not discriminate on the basis of race, color, national origin, age, disability, sex, sexual orientation, or gender identity.            Thank you!     Thank you for choosing [facility]  for your care. Our goal is always to provide you with excellent care. Hearing back from our patients is one way we can continue to improve our services. Please take a few minutes to complete the written survey that you may receive in the mail after your visit with us. Thank you!             Your Updated Medication List - Protect others around you: Learn how to safely use, store and throw away your medicines at www.disposemymeds.org.          This list is accurate as of: 12/19/17  5:43 PM.  Always use your most recent med list.                   Brand Name Dispense Instructions for use Diagnosis    ALLEGRA ALLERGY 180 MG tablet   Generic drug:  fexofenadine      Take by mouth daily Reported on 5/22/2017        CRANBERRY PO      Take by mouth daily        FLUoxetine 20 MG capsule    PROzac    120 capsule    Take 1 capsule (20 mg) by mouth daily And increase to 40 (2x20 mg) mg during the premenstrual period of time.    Anxiety state       * lisinopril 10 MG tablet    PRINIVIL/ZESTRIL    90 tablet    TAKE 1 TABLET(10 MG) BY MOUTH DAILY    Hypertension goal BP (blood pressure) < 140/90       * lisinopril 20 MG tablet    PRINIVIL/ZESTRIL    90 tablet    Take 1 tablet (20 mg) by mouth daily     Hypertension goal BP (blood pressure) < 140/90       LORazepam 0.5 MG tablet    ATIVAN    10 tablet    Take 0.5-1 tablets (0.25-0.5 mg) by mouth every 8 hours as needed for anxiety    Anxiety state       phenazopyridine 200 MG tablet    PYRIDIUM     as needed        sulfamethoxazole-trimethoprim 800-160 MG per tablet    BACTRIM DS/SEPTRA DS    10 tablet    TAKE 1 TABLET BY MOUTH ONCE DAILY AS NEEDED FOR AFTER INTERCOURSE.    Frequent UTI       SUMAtriptan 50 MG tablet    IMITREX    30 tablet    Take 1 tablet (50 mg) by mouth at onset of headache for migraine May repeat dose in 2 hours.  Do not exceed 200 mg in 24 hours    Migraine with aura and without status migrainosus, not intractable       valACYclovir 500 MG tablet    VALTREX    24 tablet    Take 1 tablet (500 mg) by mouth 2 times daily as needed X 3 days with flare.    Herpes simplex virus (HSV) infection       * Notice:  This list has 2 medication(s) that are the same as other medications prescribed for you. Read the directions carefully, and ask your doctor or other care provider to review them with you.

## 2017-12-19 NOTE — NURSING NOTE
"Chief Complaint   Patient presents with     Anxiety       Initial BP (!) 142/100 (BP Location: Right arm, Cuff Size: Adult Regular)  Pulse 70  Temp 97.9  F (36.6  C) (Oral)  Resp 16  Ht 5' 9\" (1.753 m)  Wt 163 lb 8 oz (74.2 kg)  SpO2 100%  BMI 24.14 kg/m2 Estimated body mass index is 24.14 kg/(m^2) as calculated from the following:    Height as of this encounter: 5' 9\" (1.753 m).    Weight as of this encounter: 163 lb 8 oz (74.2 kg).  Medication Reconciliation: complete   Maine Metz, BRYAN    "

## 2017-12-19 NOTE — LETTER
December 21, 2017      Nanette Burnett  [address] [place] [address]        Dear Ms. Nanette Burnett,    Enclosed is a copy of your recent results.    GFR stands for glomerular filtration rate (this is in regards to the kidney). They are now showing an estimate of this, based on the actual creatinine, age, gender, and race. This is commonly lower as one gets older. Your level of hydration can have an impact also.  If this is low, we should be aggressive with managing high blood pressure or high cholesterol.    Keep in touch! I hope you are feeling better.    I hope you have a great holiday season!    Sincerely,     Becca Trevizo MD

## 2017-12-19 NOTE — TELEPHONE ENCOUNTER
Phone visit for today is set up to discuss anxiety medication.  These are refilled x 1.  Yaima Martinez RN  Triage Nurse

## 2017-12-20 LAB
ANION GAP SERPL CALCULATED.3IONS-SCNC: 5 MMOL/L (ref 3–14)
BUN SERPL-MCNC: 11 MG/DL (ref 7–30)
CALCIUM SERPL-MCNC: 9 MG/DL (ref 8.5–10.1)
CHLORIDE SERPL-SCNC: 108 MMOL/L (ref 94–109)
CO2 SERPL-SCNC: 28 MMOL/L (ref 20–32)
CREAT SERPL-MCNC: 0.6 MG/DL (ref 0.52–1.04)
GFR SERPL CREATININE-BSD FRML MDRD: >90 ML/MIN/1.7M2
GLUCOSE SERPL-MCNC: 90 MG/DL (ref 70–99)
POTASSIUM SERPL-SCNC: 4.7 MMOL/L (ref 3.4–5.3)
SODIUM SERPL-SCNC: 141 MMOL/L (ref 133–144)

## 2017-12-20 ASSESSMENT — ANXIETY QUESTIONNAIRES: GAD7 TOTAL SCORE: 11

## 2017-12-22 DIAGNOSIS — B00.9 HERPES SIMPLEX VIRUS (HSV) INFECTION: ICD-10-CM

## 2017-12-22 RX ORDER — VALACYCLOVIR HYDROCHLORIDE 500 MG/1
TABLET, FILM COATED ORAL
Qty: 24 TABLET | Refills: 3 | Status: SHIPPED | OUTPATIENT
Start: 2017-12-22 | End: 2019-06-14

## 2017-12-22 NOTE — TELEPHONE ENCOUNTER
Requested Prescriptions   Pending Prescriptions Disp Refills     valACYclovir (VALTREX) 500 MG tablet [Pharmacy Med Name: VALACYCLOVIR 500MG TABLETS]    Last Written Prescription Date:  10/7/2016  Last Fill Quantity: 24,  # refills: prn   Last Office Visit with [facility], P or [facility] prescribing provider:  12/19/2017   Future Office Visit:      24 tablet 0     Sig: TAKE ONE TABLET BY MOUTH TWICE DAILY AS NEEDED FOR 3 DAYS WITH FLARES    Antivirals for Herpes Protocol Passed    12/22/2017  9:00 AM       Passed - Patient is age 12 or older       Passed - Recent or future visit with authorizing provider's specialty    Patient had office visit in the last year or has a visit in the next 30 days with authorizing provider.  See chart review.              Passed - Normal serum creatinine on file in past 12 months    Recent Labs   Lab Test  12/19/17   1745   CR  0.60

## 2018-01-04 ENCOUNTER — TRANSFERRED RECORDS (OUTPATIENT)
Dept: HEALTH INFORMATION MANAGEMENT | Facility: CLINIC | Age: 48
End: 2018-01-04

## 2018-03-22 DIAGNOSIS — I10 HYPERTENSION GOAL BP (BLOOD PRESSURE) < 140/90: ICD-10-CM

## 2018-03-22 NOTE — TELEPHONE ENCOUNTER
"Requested Prescriptions   Pending Prescriptions Disp Refills     lisinopril (PRINIVIL/ZESTRIL) 20 MG tablet [Pharmacy Med Name: LISINOPRIL 20MG TABLETS]  Last Written Prescription Date:  12/19/17  Last Fill Quantity: 90,  # refills: 0   Last office visit: 12/19/2017 with prescribing provider:  12/19/2017     Future Office Visit:     90 tablet 0     Sig: TAKE 1 TABLET(20 MG) BY MOUTH DAILY    ACE Inhibitors (Including Combos) Protocol Failed    3/22/2018 10:53 AM       Failed - Blood pressure under 140/90 in past 12 months    BP Readings from Last 3 Encounters:   12/19/17 (!) 142/100   09/05/17 (!) 152/98   06/19/17 132/80                Passed - Recent (12 mo) or future (30 days) visit within the authorizing provider's specialty    Patient had office visit in the last 12 months or has a visit in the next 30 days with authorizing provider or within the authorizing provider's specialty.  See \"Patient Info\" tab in inbasket, or \"Choose Columns\" in Meds & Orders section of the refill encounter.           Passed - Patient is age 18 or older       Passed - No active pregnancy on record       Passed - Normal serum creatinine on file in past 12 months    Recent Labs   Lab Test  12/19/17   1745   CR  0.60            Passed - Normal serum potassium on file in past 12 months    Recent Labs   Lab Test  12/19/17   1745   POTASSIUM  4.7            Passed - No positive pregnancy test in past 12 months          "

## 2018-03-22 NOTE — TELEPHONE ENCOUNTER
Routing refill request to provider for review/approval because:  BP elevated, please advise, would you want a visit or BP check?  Yaima Martinez, RN  Triage Nurse

## 2018-03-23 RX ORDER — LISINOPRIL 20 MG/1
TABLET ORAL
Qty: 30 TABLET | Refills: 0 | Status: SHIPPED | OUTPATIENT
Start: 2018-03-23 | End: 2018-04-09

## 2018-03-23 NOTE — TELEPHONE ENCOUNTER
1 month sent per below. F/u OV scheduled.    Zoe KELSEY, RN, BSN, PHN  [facility]

## 2018-04-09 ENCOUNTER — OFFICE VISIT (OUTPATIENT)
Dept: FAMILY MEDICINE | Facility: CLINIC | Age: 48
End: 2018-04-09
Payer: COMMERCIAL

## 2018-04-09 VITALS
SYSTOLIC BLOOD PRESSURE: 126 MMHG | RESPIRATION RATE: 16 BRPM | DIASTOLIC BLOOD PRESSURE: 86 MMHG | TEMPERATURE: 98 F | WEIGHT: 156.3 LBS | BODY MASS INDEX: 23.15 KG/M2 | OXYGEN SATURATION: 98 % | HEART RATE: 82 BPM | HEIGHT: 69 IN

## 2018-04-09 DIAGNOSIS — I10 HYPERTENSION GOAL BP (BLOOD PRESSURE) < 140/90: Primary | ICD-10-CM

## 2018-04-09 DIAGNOSIS — E83.51 HYPOCALCEMIA: ICD-10-CM

## 2018-04-09 DIAGNOSIS — G43.109 MIGRAINE WITH AURA AND WITHOUT STATUS MIGRAINOSUS, NOT INTRACTABLE: ICD-10-CM

## 2018-04-09 DIAGNOSIS — F41.1 ANXIETY STATE: ICD-10-CM

## 2018-04-09 PROCEDURE — 80048 BASIC METABOLIC PNL TOTAL CA: CPT | Performed by: FAMILY MEDICINE

## 2018-04-09 PROCEDURE — 36415 COLL VENOUS BLD VENIPUNCTURE: CPT | Performed by: FAMILY MEDICINE

## 2018-04-09 PROCEDURE — 82043 UR ALBUMIN QUANTITATIVE: CPT | Performed by: FAMILY MEDICINE

## 2018-04-09 PROCEDURE — 99214 OFFICE O/P EST MOD 30 MIN: CPT | Performed by: FAMILY MEDICINE

## 2018-04-09 RX ORDER — LORAZEPAM 0.5 MG/1
.25-.5 TABLET ORAL EVERY 8 HOURS PRN
Qty: 10 TABLET | Refills: 0 | Status: SHIPPED | OUTPATIENT
Start: 2018-04-09 | End: 2022-10-04

## 2018-04-09 RX ORDER — LISINOPRIL 20 MG/1
TABLET ORAL
Qty: 90 TABLET | Refills: 1 | Status: SHIPPED | OUTPATIENT
Start: 2018-04-09 | End: 2018-10-22

## 2018-04-09 ASSESSMENT — ANXIETY QUESTIONNAIRES
1. FEELING NERVOUS, ANXIOUS, OR ON EDGE: SEVERAL DAYS
5. BEING SO RESTLESS THAT IT IS HARD TO SIT STILL: NOT AT ALL
7. FEELING AFRAID AS IF SOMETHING AWFUL MIGHT HAPPEN: NOT AT ALL
GAD7 TOTAL SCORE: 6
6. BECOMING EASILY ANNOYED OR IRRITABLE: MORE THAN HALF THE DAYS
3. WORRYING TOO MUCH ABOUT DIFFERENT THINGS: SEVERAL DAYS
2. NOT BEING ABLE TO STOP OR CONTROL WORRYING: SEVERAL DAYS
IF YOU CHECKED OFF ANY PROBLEMS ON THIS QUESTIONNAIRE, HOW DIFFICULT HAVE THESE PROBLEMS MADE IT FOR YOU TO DO YOUR WORK, TAKE CARE OF THINGS AT HOME, OR GET ALONG WITH OTHER PEOPLE: SOMEWHAT DIFFICULT

## 2018-04-09 ASSESSMENT — PATIENT HEALTH QUESTIONNAIRE - PHQ9: 5. POOR APPETITE OR OVEREATING: SEVERAL DAYS

## 2018-04-09 NOTE — MR AVS SNAPSHOT
After Visit Summary   4/9/2018    Nanette Burnett    MRN: [id number]           Patient Information     Date Of Birth          1970        Visit Information        Provider Department      4/9/2018 2:30 PM Becca Trevizo MD [facility]        Today's Diagnoses     Anxiety state        Hypertension goal BP (blood pressure) < 140/90        Migraine           Follow-ups after your visit        Follow-up notes from your care team     Return in about 6 months (around 10/9/2018).      Your next 10 appointments already scheduled     Oct 22, 2018 10:50 AM CDT   Office Visit with Becca Trevizo MD   [facility] ([facility])    [address] [facility] [address] [phone]           Bring a current list of meds and any records pertaining to this visit. For Physicals, please bring immunization records and any forms needing to be filled out. Please arrive 10 minutes early to complete paperwork.              Who to contact     If you have questions or need follow up information about today's clinic visit or your schedule please contact [facility] directly at [phone].  Normal or non-critical lab and imaging results will be communicated to you by MyChart, letter or phone within 4 business days after the clinic has received the results. If you do not hear from us within 7 days, please contact the clinic through [employer] or phone. If you have a critical or abnormal lab result, we will notify you by phone as soon as possible.  Submit refill requests through [employer] or call your pharmacy and they will forward the refill request to us. Please allow 3 business days for your refill to be completed.          Additional Information About Your Visit        MyChart Information     [employer] lets you send messages to your doctor, view your test results, renew your prescriptions, schedule appointments and more. To sign up, go to  [email or web address] . Click on \"Log in\" on the left side of the screen, which will take you to the Welcome page. Then click on \"Sign up Now\" on the right side of the page.     You will be asked to enter the access code listed below, as well as some personal information. Please follow the directions to create your username and password.     Your access code is: [email or web address]  Expires: 2018  2:59 PM     Your access code will  in 90 days. If you need help or a new code, please call your [facility] clinic or [phone].        Care EveryWhere ID     This is your Care EveryWhere ID. This could be used by other organizations to access your [facility] medical records  [id number]        Your Vitals Were     Pulse Temperature Respirations Height Pulse Oximetry BMI (Body Mass Index)    82 98  F (36.7  C) (Oral) 16 5' 9\" (1.753 m) 98% 23.08 kg/m2       Blood Pressure from Last 3 Encounters:   18 126/86   17 (!) 142/100   17 (!) 152/98    Weight from Last 3 Encounters:   18 156 lb 4.8 oz (70.9 kg)   17 163 lb 8 oz (74.2 kg)   17 172 lb (78 kg)              We Performed the Following     Albumin Random Urine Quantitative with Creat Ratio     Basic metabolic panel  (Ca, Cl, CO2, Creat, Gluc, K, Na, BUN)          Today's Medication Changes          These changes are accurate as of 18  3:04 PM.  If you have any questions, ask your nurse or doctor.               These medicines have changed or have updated prescriptions.        Dose/Directions    lisinopril 20 MG tablet   Commonly known as:  PRINIVIL/ZESTRIL   This may have changed:  See the new instructions.   Used for:  Hypertension goal BP (blood pressure) < 140/90   Changed by:  Becca Trevizo MD        TAKE 1 TABLET(20 MG) BY MOUTH DAILY   Quantity:  90 tablet   Refills:  1            Where to get your medicines      These medications were sent to [facility] [id number] [place] [address] [address] AT [facility] [place] & " [phone] [facility] [address]     Phone:  [phone]     FLUoxetine 20 MG capsule    lisinopril 20 MG tablet         Some of these will need a paper prescription and others can be bought over the counter.  Ask your nurse if you have questions.     Bring a paper prescription for each of these medications     LORazepam 0.5 MG tablet                Primary Care Provider Office Phone # Fax #    Becca Trevizo -[phone] [address] [facility] [address]        Equal Access to Services     [facility]: Hadii aad ku hadasho Soomaali, waaxda luqadaha, qaybta kaalmada adeegyada, waxsabrina velasco haylogan berumen . So [facility] [phone].    ATENCIÓN: Si habla español, tiene a garcia disposición servicios gratuitos de asistencia lingüística. [facility] [phone].    We comply with applicable federal civil rights laws and [place] laws. We do not discriminate on the basis of race, color, national origin, age, disability, sex, sexual orientation, or gender identity.            Thank you!     Thank you for choosing [facility]  for your care. Our goal is always to provide you with excellent care. Hearing back from our patients is one way we can continue to improve our services. Please take a few minutes to complete the written survey that you may receive in the mail after your visit with us. Thank you!             Your Updated Medication List - Protect others around you: Learn how to safely use, store and throw away your medicines at www.disposemymeds.org.          This list is accurate as of 4/9/18  3:04 PM.  Always use your most recent med list.                   Brand Name Dispense Instructions for use Diagnosis    ALLEGRA ALLERGY 180 MG tablet   Generic drug:  fexofenadine      Take by mouth daily Reported on 5/22/2017        CRANBERRY PO      Take by mouth daily        FLUoxetine 20 MG capsule    PROzac    120 capsule    Take 1 capsule (20 mg) by  mouth daily And increase to 40 (2x20 mg) mg during the premenstrual period of time.    Anxiety state       lisinopril 20 MG tablet    PRINIVIL/ZESTRIL    90 tablet    TAKE 1 TABLET(20 MG) BY MOUTH DAILY    Hypertension goal BP (blood pressure) < 140/90       LORazepam 0.5 MG tablet    ATIVAN    10 tablet    Take 0.5-1 tablets (0.25-0.5 mg) by mouth every 8 hours as needed for anxiety    Anxiety state       phenazopyridine 200 MG tablet    PYRIDIUM     as needed        sulfamethoxazole-trimethoprim 800-160 MG per tablet    BACTRIM DS/SEPTRA DS    10 tablet    TAKE 1 TABLET BY MOUTH ONCE DAILY AS NEEDED FOR AFTER INTERCOURSE.    Frequent UTI       SUMAtriptan 50 MG tablet    IMITREX    30 tablet    Take 1 tablet (50 mg) by mouth at onset of headache for migraine May repeat dose in 2 hours.  Do not exceed 200 mg in 24 hours    Migraine with aura and without status migrainosus, not intractable       valACYclovir 500 MG tablet    VALTREX    24 tablet    TAKE ONE TABLET BY MOUTH TWICE DAILY AS NEEDED FOR 3 DAYS WITH FLARES    Herpes simplex virus (HSV) infection

## 2018-04-09 NOTE — LETTER
[facility]  [address] [facility] [address] [phone]          April 16, 2018    Nanette Burnett                                                                                                                     [address] [place] [address]            Dear Nanette,    Enclosed is a copy of your recent results.    The urine albumin to creatinine ratio serves as a marker of higher risk of cardiovascular and kidney disease.  If this is elevated, we typically like to see you on either an ACE inhibitor, or an ARB.   We are also getting more aggressive with both blood pressure and cholesterol goals.   (The number we are looking at is the ratio; this usually appears at the bottom of the report with a normal range 0-25).    The calcium is a little low. It may or may not be significant. I would like to recheck this in the future and also include an albumin. The calcium is highly protein bound and there is a correction for this that uses the protein, albumin, in it.  I will put in a future lab order.     Have a great Spring!    Sincerely,         Becca Trevizo MD

## 2018-04-09 NOTE — PROGRESS NOTES
SUBJECTIVE:   Nanette Burnett is a 47 year old female who presents to clinic today for the following health issues:      Hypertension Follow-up      Outpatient blood pressures are being checked at [facility].  Results are 120's/ 90's.    Low Salt Diet: no added salt    Anxiety Follow-Up    Status since last visit: Worsened     Other associated symptoms:heart racing    Complicating factors:   Significant life event: Yes-  Work/family stressors   Current substance abuse: None  Depression symptoms: No  HAO-7 SCORE 9/10/2015 5/22/2017 12/19/2017   Total Score - - -   Total Score 3 3 11       HAO-7    Amount of exercise or physical activity: 5 times per week     Problems taking medications regularly: No    Medication side effects: none    Diet: regular (no restrictions)            Problem list and histories reviewed & adjusted, as indicated.  Additional history:     See under ROS     Patient Active Problem List   Diagnosis     Herpes simplex virus (HSV) infection     Anxiety state     Allergic state     External hemorrhoids     CARDIOVASCULAR SCREENING; LDL GOAL LESS THAN 160     Dysplasia of cervix     Migraine     Health Care Home     Anemia, unspecified type     Hypertension goal BP (blood pressure) < 140/90       Current Outpatient Prescriptions   Medication Sig Dispense Refill     lisinopril (PRINIVIL/ZESTRIL) 20 MG tablet TAKE 1 TABLET(20 MG) BY MOUTH DAILY 30 tablet 0     valACYclovir (VALTREX) 500 MG tablet TAKE ONE TABLET BY MOUTH TWICE DAILY AS NEEDED FOR 3 DAYS WITH FLARES 24 tablet 3     SUMAtriptan (IMITREX) 50 MG tablet Take 1 tablet (50 mg) by mouth at onset of headache for migraine May repeat dose in 2 hours.  Do not exceed 200 mg in 24 hours 30 tablet 0     FLUoxetine (PROZAC) 20 MG capsule Take 1 capsule (20 mg) by mouth daily And increase to 40 (2x20 mg) mg during the premenstrual period of time. 120 capsule 0     LORazepam (ATIVAN) 0.5 MG tablet Take 0.5-1 tablets (0.25-0.5 mg) by mouth every 8 hours as  "needed for anxiety 10 tablet 0     sulfamethoxazole-trimethoprim (BACTRIM DS/SEPTRA DS) 800-160 MG per tablet TAKE 1 TABLET BY MOUTH ONCE DAILY AS NEEDED FOR AFTER INTERCOURSE. 10 tablet 0     CRANBERRY PO Take by mouth daily       phenazopyridine (PYRIDIUM) 200 MG tablet as needed        fexofenadine (ALLEGRA ALLERGY) 180 MG tablet Take by mouth daily Reported on 5/22/2017           Reviewed and updated as needed this visit by clinical staff  Tobacco  Allergies  Meds  Med Hx  Surg Hx  Fam Hx  Soc Hx      Reviewed and updated as needed this visit by Provider         ROS:  CONSTITUTIONAL:NEGATIVE for fever, chills, change in weight  RESP:NEGATIVE for significant cough or SOB  CV: NEGATIVE for chest pain, palpitations or peripheral edema  GI: see below  PSYCHIATRIC: NEGATIVE for changes in mood or affect    Has had a stomach issue over the weekend.   Queezy. vomited yesterday; not again. Mild diarrhea today.      Did not do the increase (luteal phase dosing) of fluoxetine. Has had a couple cycles and did OK. They crept up on her; she had been busy.    Has taken ativan a few times; it seems to have helped.     Would like to have the option to take it. Would like to have some around. Believes has 4-5 left (was given #10 mid December).    OBJECTIVE:     /86 (BP Location: Right arm, Cuff Size: Adult Regular)  Pulse 82  Temp 98  F (36.7  C) (Oral)  Resp 16  Ht 5' 9\" (1.753 m)  Wt 156 lb 4.8 oz (70.9 kg)  SpO2 98%  BMI 23.08 kg/m2  Body mass index is 23.08 kg/(m^2).  GENERAL APPEARANCE: alert and no distress  RESP: lungs clear to auscultation - no rales, rhonchi or wheezes  CV: regular rates and rhythm  MS: no edema.   PSYCH: mentation appears normal and affect normal/bright    PHQ-9 SCORE 5/22/2017 12/19/2017 4/9/2018   Total Score - - -   Total Score MyChart - - -   Total Score 2 11 5       HAO-7 SCORE 5/22/2017 12/19/2017 4/9/2018   Total Score - - -   Total Score 3 11 6           ASSESSMENT/PLAN: "   Hypertension goal BP (blood pressure) < 140/90  Controlled. No change in dose.   - lisinopril (PRINIVIL/ZESTRIL) 20 MG tablet; TAKE 1 TABLET(20 MG) BY MOUTH DAILY  - Albumin Random Urine Quantitative with Creat Ratio  - Basic metabolic panel  (Ca, Cl, CO2, Creat, Gluc, K, Na, BUN)    Anxiety state  Doing well.  Reviewed some of the potential side effects with lorazepam. Continue to use sparingly. Keep out of the way of others.   - LORazepam (ATIVAN) 0.5 MG tablet; Take 0.5-1 tablets (0.25-0.5 mg) by mouth every 8 hours as needed for anxiety  - FLUoxetine (PROZAC) 20 MG capsule; Take 1 capsule (20 mg) by mouth daily And increase to 40 (2x20 mg) mg during the premenstrual period of time.    Migraine    - Basic metabolic panel  (Ca, Cl, CO2, Creat, Gluc, K, Na, BUN)      Return to clinic 6 months.       Becca Trevizo MD, MD  [facility]

## 2018-04-10 ENCOUNTER — TELEPHONE (OUTPATIENT)
Dept: FAMILY MEDICINE | Facility: CLINIC | Age: 48
End: 2018-04-10

## 2018-04-10 LAB
ANION GAP SERPL CALCULATED.3IONS-SCNC: 6 MMOL/L (ref 3–14)
BUN SERPL-MCNC: 11 MG/DL (ref 7–30)
CALCIUM SERPL-MCNC: 8.3 MG/DL (ref 8.5–10.1)
CHLORIDE SERPL-SCNC: 108 MMOL/L (ref 94–109)
CO2 SERPL-SCNC: 27 MMOL/L (ref 20–32)
CREAT SERPL-MCNC: 0.7 MG/DL (ref 0.52–1.04)
CREAT UR-MCNC: 326 MG/DL
GFR SERPL CREATININE-BSD FRML MDRD: 89 ML/MIN/1.7M2
GLUCOSE SERPL-MCNC: 93 MG/DL (ref 70–99)
MICROALBUMIN UR-MCNC: 19 MG/L
MICROALBUMIN/CREAT UR: 5.8 MG/G CR (ref 0–25)
POTASSIUM SERPL-SCNC: 3.7 MMOL/L (ref 3.4–5.3)
SODIUM SERPL-SCNC: 141 MMOL/L (ref 133–144)

## 2018-04-10 ASSESSMENT — PATIENT HEALTH QUESTIONNAIRE - PHQ9: SUM OF ALL RESPONSES TO PHQ QUESTIONS 1-9: 5

## 2018-04-10 ASSESSMENT — ANXIETY QUESTIONNAIRES: GAD7 TOTAL SCORE: 6

## 2018-04-10 NOTE — TELEPHONE ENCOUNTER
Patient seen 4/9 by pcp. Had c/o GI issues. Patient states feels like indigestion. Some soreness under Rt rib.    C/o whitish formed stool today. Asking if should have any other blood testing done or should just monitor.    Please advise.    Dang Moore RN

## 2018-04-10 NOTE — TELEPHONE ENCOUNTER
She was in for other reasons, but had mentioned it.  We were thinking probably a viral thing that would resolve on its own.    I would recommend to be seen again if symptoms continue or worsen.   I could get her in Thurs/Fri    We might want to consider ultrasound to look for gall bladder etiology.   She could try tums/antacid to see if helps (would usually help for acid related etiologies)

## 2018-07-30 ENCOUNTER — TRANSFERRED RECORDS (OUTPATIENT)
Dept: HEALTH INFORMATION MANAGEMENT | Facility: CLINIC | Age: 48
End: 2018-07-30

## 2018-08-02 ENCOUNTER — OFFICE VISIT (OUTPATIENT)
Dept: FAMILY MEDICINE | Facility: CLINIC | Age: 48
End: 2018-08-02
Payer: COMMERCIAL

## 2018-08-02 VITALS
RESPIRATION RATE: 16 BRPM | TEMPERATURE: 97.9 F | SYSTOLIC BLOOD PRESSURE: 116 MMHG | DIASTOLIC BLOOD PRESSURE: 72 MMHG | HEART RATE: 70 BPM | WEIGHT: 169.2 LBS | OXYGEN SATURATION: 100 % | BODY MASS INDEX: 25.06 KG/M2 | HEIGHT: 69 IN

## 2018-08-02 DIAGNOSIS — Z00.00 ENCOUNTER FOR ROUTINE ADULT HEALTH EXAMINATION WITHOUT ABNORMAL FINDINGS: Primary | ICD-10-CM

## 2018-08-02 DIAGNOSIS — E83.51 HYPOCALCEMIA: ICD-10-CM

## 2018-08-02 DIAGNOSIS — L98.9 SKIN LESION: ICD-10-CM

## 2018-08-02 DIAGNOSIS — Z12.4 SCREENING FOR MALIGNANT NEOPLASM OF CERVIX: ICD-10-CM

## 2018-08-02 PROCEDURE — 99396 PREV VISIT EST AGE 40-64: CPT | Performed by: FAMILY MEDICINE

## 2018-08-02 PROCEDURE — 87624 HPV HI-RISK TYP POOLED RSLT: CPT | Performed by: FAMILY MEDICINE

## 2018-08-02 PROCEDURE — G0145 SCR C/V CYTO,THINLAYER,RESCR: HCPCS | Performed by: FAMILY MEDICINE

## 2018-08-02 PROCEDURE — 36415 COLL VENOUS BLD VENIPUNCTURE: CPT | Performed by: FAMILY MEDICINE

## 2018-08-02 PROCEDURE — 80069 RENAL FUNCTION PANEL: CPT | Performed by: FAMILY MEDICINE

## 2018-08-02 RX ORDER — TRIAMCINOLONE ACETONIDE 1 MG/G
CREAM TOPICAL
Qty: 45 G | Refills: 0 | Status: SHIPPED | OUTPATIENT
Start: 2018-08-02 | End: 2023-01-25

## 2018-08-02 RX ORDER — DROSPIRENONE AND ETHINYL ESTRADIOL 0.02-3(28)
1 KIT ORAL DAILY
COMMUNITY
End: 2019-09-27

## 2018-08-02 ASSESSMENT — ENCOUNTER SYMPTOMS
JOINT SWELLING: 0
HEARTBURN: 0
NAUSEA: 0
HEMATOCHEZIA: 0
DIARRHEA: 0
FEVER: 0
FREQUENCY: 0
ARTHRALGIAS: 0
HEMATURIA: 0
PALPITATIONS: 0
NERVOUS/ANXIOUS: 1
COUGH: 0
ABDOMINAL PAIN: 0
BREAST MASS: 0
CHILLS: 0
EYE PAIN: 0
PARESTHESIAS: 1
WEAKNESS: 0
MYALGIAS: 0
DYSURIA: 0
SORE THROAT: 0
HEADACHES: 1
SHORTNESS OF BREATH: 0
DIZZINESS: 0
CONSTIPATION: 0

## 2018-08-02 NOTE — LETTER
[facility]  [address] [address] [address] [phone]          August 6, 2018    Nanette Burnett                                                                                                                     [address] [address] [address]            Dear Daniel Fitzpatrick is a copy of your recent results.  You should be hearing about your pap result in a separate communication.  These look good.    The calcium is flagged a little low. The calcium is highly protein bound and there is a correction for this that uses the protein, albumin, in it. When I apply this, it is normal.    GFR stands for glomerular filtration rate (this is in regards to the kidney). They are now showing an estimate of this, based on the actual creatinine, age, gender, and race. This is commonly lower as one gets older. Your level of hydration can have an impact also.  If this is low, we should be aggressive with managing high blood pressure or high cholesterol.    I hope you are having a nice summer!    Sincerely,         Becca Trevizo MD

## 2018-08-02 NOTE — LETTER
August 16, 2018    Nanette Burnett  [address] [address] [address]    Dear Nanette,  We are happy to inform you that your PAP smear result from 08/02/18 is normal.  We are now able to do a follow up test on PAP smears. The DNA test is for HPV (Human Papilloma Virus). Cervical cancer is closely linked with certain types of HPV. Your results showed no evidence of high risk HPV.  Therefore we recommend you return in 3 years for your next pap smear and HPV test.  You will still need to return to the clinic every year for an annual exam and other preventive tests.  Please contact the clinic at [phone] with any questions.  Sincerely,    Becca Trevizo MD/[facility]

## 2018-08-02 NOTE — PROGRESS NOTES
SUBJECTIVE:   CC: Nanette Burnett is an 48 year old woman who presents for preventive health visit.     Physical   Annual:     Getting at least 3 servings of Calcium per day:  Yes    Bi-annual eye exam:  Yes    Dental care twice a year:  Yes    Sleep apnea or symptoms of sleep apnea:  Daytime drowsiness    Frequency of exercise:  4-5 days/week    Duration of exercise:  30-45 minutes    Taking medications regularly:  Yes    Medication side effects:  None    Additional concerns today:  YES        Discuss getting a cream for eczema.      Today's PHQ-2 Score:   PHQ-2 ( 1999 [employer]) 8/2/2018   Q1: Little interest or pleasure in doing things 1   Q2: Feeling down, depressed or hopeless 1   PHQ-2 Score 2   Q1: Little interest or pleasure in doing things Several days   Q2: Feeling down, depressed or hopeless Several days   PHQ-2 Score 2       Abuse: Current or Past(Physical, Sexual or Emotional)- no  Do you feel safe in your environment - Yes    Social History   Substance Use Topics     Smoking status: Never Smoker     Smokeless tobacco: Never Used      Comment: occasionally in the past - social     Alcohol use 0.0 - 0.6 oz/week     0 - 1 Standard drinks or equivalent per week     Alcohol Use 8/2/2018   If you drink alcohol do you typically have greater than 3 drinks per day OR greater than 7 drinks per week? No       Reviewed orders with patient.  Reviewed health maintenance and updated orders accordingly - Yes          Pertinent mammograms are reviewed under the imaging tab.  History of abnormal Pap smear: YES - updated in Problem List and Health Maintenance accordingly  PAP / HPV Latest Ref Rng & Units 10/7/2016 9/10/2015 10/21/2014   PAP - NIL NIL NIL   HPV 16 DNA NEG Negative - -   HPV 18 DNA NEG Negative - -   OTHER HR HPV NEG Negative - -     Reviewed and updated as needed this visit by clinical staff         Reviewed and updated as needed this visit by Provider        Patient Active Problem List   Diagnosis      Herpes simplex virus (HSV) infection     Anxiety state     Allergic state     External hemorrhoids     CARDIOVASCULAR SCREENING; LDL GOAL LESS THAN 160     Dysplasia of cervix     Migraine     Health Care Home     Anemia, unspecified type     Hypertension goal BP (blood pressure) < 140/90       Past Medical History:   Diagnosis Date     Allergy, unspecified not elsewhere classified     Hayfever, better in the early      Anxiety state, unspecified      Dysplastic nevus     near toe. another suspicious mole right thigh. sees Derm     Genital herpes, unspecified     about 2 flares per year     Insomnia, unspecified      Migraine, unspecified, without mention of intractable migraine without mention of status migrainosus     will have some with aura; has others       Past Surgical History:   Procedure Laterality Date     ARTHROSCOPY KNEE Right 2016    lateral and medial meniscal tear     C NONSPECIFIC PROCEDURE  age 6    s/p tonsillectomy & adenoidectomy     C NONSPECIFIC PROCEDURE      L knee surgery ACL-arthroscopic/open     C NONSPECIFIC PROCEDURE      vein stripping x 2     C NONSPECIFIC PROCEDURE      R ACL surgery     C NONSPECIFIC PROCEDURE      s/p L inguinal hernia repair     C NONSPECIFIC PROCEDURE      abnormal pap-TCA     C NONSPECIFIC PROCEDURE      bunion surgery L     C NONSPECIFIC PROCEDURE      Bilateral saphenous vein     C/SECTION, LOW TRANSVERSE  3/05    , Low Transverse     C/SECTION, LOW TRANSVERSE       HERNIORRHAPHY INGUINAL  2012    Procedure: HERNIORRHAPHY INGUINAL;;  Surgeon: Tray Valenzuela MD;  Location: [facility] OR     LAPAROSCOPIC TUBAL LIGATION  2012    Procedure: LAPAROSCOPIC TUBAL LIGATION;  LAPAROSCOPIC TUBAL LIGATION, Right Inguinal Hernia Repair with Mesh ;  Surgeon: Gerson Burrell MD;  Location: [facility] OR       Obstetric History       T2      L2     SAB0   TAB0   Ectopic0   Multiple0   Live Births2       #  Outcome Date GA Lbr Liam/2nd Weight Sex Delivery Anes PTL Lv   2 Term 11/17/08 39w0d  8 lb (3.629 kg) F CS-Unspec EPI N THOMAS      Apgar1:  8                Apgar5: 9   1 Term 03/23/05 39w0d  7 lb 14 oz (3.572 kg) F CS EPIDURAL  THOMAS      Name: Lexy          Current Outpatient Prescriptions   Medication Sig Dispense Refill     CRANBERRY PO Take by mouth daily       drospirenone-ethinyl estradiol (LORYNA) 3-0.02 MG per tablet Take 1 tablet by mouth daily       fexofenadine (ALLEGRA ALLERGY) 180 MG tablet Take by mouth daily Reported on 5/22/2017       FLUoxetine (PROZAC) 20 MG capsule Take 1 capsule (20 mg) by mouth daily And increase to 40 (2x20 mg) mg during the premenstrual period of time. 120 capsule 1     lisinopril (PRINIVIL/ZESTRIL) 20 MG tablet TAKE 1 TABLET(20 MG) BY MOUTH DAILY 90 tablet 1     LORazepam (ATIVAN) 0.5 MG tablet Take 0.5-1 tablets (0.25-0.5 mg) by mouth every 8 hours as needed for anxiety 10 tablet 0     SUMAtriptan (IMITREX) 50 MG tablet Take 1 tablet (50 mg) by mouth at onset of headache for migraine May repeat dose in 2 hours.  Do not exceed 200 mg in 24 hours 30 tablet 0             valACYclovir (VALTREX) 500 MG tablet TAKE ONE TABLET BY MOUTH TWICE DAILY AS NEEDED FOR 3 DAYS WITH FLARES 24 tablet 3       Family History   Problem Relation Age of Onset     Cancer Other      Prostate CA     Hypertension Father      Neurologic Disorder Father      ALS     Thyroid Disease Mother      actually hypoglycemia; not thyroid.     Hypertension Maternal Grandmother      Cerebrovascular Disease Maternal Grandmother      Cancer Maternal Grandmother      ovarian/uterine dx at age 50's     Breast Cancer Paternal Grandmother      dx at age 60's       Social History   Substance Use Topics     Smoking status: Never Smoker     Smokeless tobacco: Never Used      Comment: occasionally in the past - social     Alcohol use 0.0 - 0.6 oz/week     0 - 1 Standard drinks or equivalent per week       Immunization History  "  Administered Date(s) Administered     Influenza (IIV3) PF 10/19/2010, 10/24/2012     Influenza Vaccine IM 3yrs+ 4 Valent IIV4 11/19/2013, 10/21/2014, 10/07/2016, 11/27/2017     TD (ADULT, 7+) 08/06/1998, 11/01/2005     TDAP Vaccine (Adacel) 11/02/2011         Review of Systems   Constitutional: Negative for chills and fever.   HENT: Negative for congestion, ear pain, hearing loss and sore throat.    Eyes: Negative for pain and visual disturbance.   Respiratory: Negative for cough and shortness of breath.    Cardiovascular: Negative for chest pain, palpitations and peripheral edema.   Gastrointestinal: Negative for abdominal pain, constipation, diarrhea, heartburn, hematochezia and nausea.   Breasts:  Negative for tenderness, breast mass and discharge.   Genitourinary: Positive for vaginal bleeding (see below). Negative for dysuria, frequency, genital sores, hematuria, pelvic pain, urgency and vaginal discharge.   Musculoskeletal: Negative for arthralgias, joint swelling and myalgias.   Skin: Negative for rash.   Neurological: Positive for headaches (Not new. Might be more recently. Not awful. Imitrex helps. ) and paresthesias (Not sure what this is about; but does note gets itchy spots.). Negative for dizziness and weakness.   Psychiatric/Behavioral: Positive for mood changes. The patient is nervous/anxious.      [facility] in May. Placed on consistent hormones; hoping to help with PMS. Did well and then got period on Friday.   Was to take on a q 3 month schedule.    In April, started fitness center. Toning.  Now with OCP, feeling like weight gain.     Will get itchy spots.          OBJECTIVE:   /72 (BP Location: Right arm, Cuff Size: Adult Regular)  Pulse 70  Temp 97.9  F (36.6  C) (Oral)  Resp 16  Ht 5' 9\" (1.753 m)  Wt 169 lb 3.2 oz (76.7 kg)  SpO2 100%  BMI 24.99 kg/m2  Physical Exam  GENERAL: alert and no distress  EYES: Eyes grossly normal to inspection, PERRL and conjunctivae and " sclerae normal  HENT: ear canals and TM's normal, nose and mouth without ulcers or lesions  NECK: no adenopathy, no asymmetry, masses, or scars and thyroid normal to palpation  RESP: lungs clear to auscultation - no rales, rhonchi or wheezes  BREAST: normal without masses, tenderness or nipple discharge and no palpable axillary masses or adenopathy  CV: regular rates and rhythm, peripheral pulses strong and no peripheral edema  ABDOMEN: soft, nontender, no hepatosplenomegaly, no masses and bowel sounds normal   (female): normal female external genitalia, normal urethral meatus, vaginal mucosa pink, moist, well rugated, and normal cervix/adnexa/uterus without masses or discharge x small amount of blood in the vaginal vault (getting over a period)  MS: no gross musculoskeletal defects noted, no edema  SKIN: no suspicious lesions or rashes. She does have a macular area that has slight scale.  NEURO: Normal strength and tone, mentation intact and speech normal  PSYCH: mentation appears normal, affect normal/bright    Recent Labs   Lab Test  09/10/15   0954  10/24/14   0812   CHOL  148  161   HDL  97  90   LDL  42  60   TRIG  45  53   CHOLHDLRATIO  1.5  1.8             ASSESSMENT/PLAN:   Encounter for routine adult health examination without abnormal findings      Skin lesion - itchy spots on arms  Will try steroid. Discussed intermittent use. If not improving, to let us know.  - triamcinolone (KENALOG) 0.1 % cream; Apply sparingly to affected area three times daily as needed    Hypocalcemia  Suspect insignificant; possibly related to lower albumin. Will recheck with albumin so we can do correction if needed.   - Renal panel (Alb, BUN, Ca, Cl, CO2, Creat, Gluc, Phos, K, Na)    Screening for malignant neoplasm of cervix    - Pap imaged thin layer screen with HPV - recommended age 30 - 65  - HPV High Risk Types DNA Cervical      Requested [facility] to get records from [facility].      COUNSELING:  Reviewed preventive  "health counseling, as reflected in patient instructions       Regular exercise       Healthy diet/nutrition       Vision screening       Osteoporosis Prevention/Bone Health    BP Readings from Last 1 Encounters:   04/09/18 126/86     Estimated body mass index is 23.08 kg/(m^2) as calculated from the following:    Height as of 4/9/18: 5' 9\" (1.753 m).    Weight as of 4/9/18: 156 lb 4.8 oz (70.9 kg).    BP Screening:   Last 3 BP Readings:    BP Readings from Last 3 Encounters:   08/02/18 116/72   04/09/18 126/86   12/19/17 (!) 142/100       The following was recommended to the patient:  Re-screen BP within a year and recommended lifestyle modifications       reports that she has never smoked. She has never used smokeless tobacco.      Counseling Resources:  ATP IV Guidelines  Pooled Cohorts Equation Calculator  Breast Cancer Risk Calculator  FRAX Risk Assessment  ICSI Preventive Guidelines  Dietary Guidelines for Americans, 2010  USDA's MyPlate  ASA Prophylaxis  Lung CA Screening    Becca Trevizo MD, MD  [facility] [place]  Answers for HPI/ROS submitted by the patient on 8/2/2018   PHQ-2 Score: 2    "

## 2018-08-02 NOTE — MR AVS SNAPSHOT
After Visit Summary   8/2/2018    Nanette Burnett    MRN: [id number]           Patient Information     Date Of Birth          1970        Visit Information        Provider Department      8/2/2018 3:10 PM [facility]        Today's Diagnoses     Skin lesion - itchy spots on arms    -  1    Hypocalcemia        Screening for malignant neoplasm of cervix          Care Instructions      Preventive Health Recommendations  Female Ages 40 to 49    Yearly exam:     See your health care provider every year in order to  1. Review health changes.   2. Discuss preventive care.    3. Review your medicines if your doctor prescribed any.      Get a Pap test every three years (unless you have an abnormal result and your provider advises testing more often).      If you get Pap tests with HPV test, you only need to test every 5 years, unless you have an abnormal result. You do not need a Pap test if your uterus was removed (hysterectomy) and you have not had cancer.      You should be tested each year for STDs (sexually transmitted diseases), if you're at risk.     Ask your doctor if you should have a mammogram.      Have a colonoscopy (test for colon cancer) if someone in your family has had colon cancer or polyps before age 50.       Have a cholesterol test every 5 years.       Have a diabetes test (fasting glucose) after age 45. If you are at risk for diabetes, you should have this test every 3 years.    Shots: Get a flu shot each year. Get a tetanus shot every 10 years.     Nutrition:     Eat at least 5 servings of fruits and vegetables each day.    Eat whole-grain bread, whole-wheat pasta and brown rice instead of white grains and rice.    Get adequate Calcium and Vitamin D.      Lifestyle    Exercise at least 150 minutes a week (an average of 30 minutes a day, 5 days a week). This will help you control your weight and prevent disease.    Limit alcohol to one drink per day.    No  "smoking.     Wear sunscreen to prevent skin cancer.    See your dentist every six months for an exam and cleaning.          Follow-ups after your visit        Your next 10 appointments already scheduled     Oct 22, 2018 10:50 AM CDT   Office Visit with Becca Trevizo MD   [facility]    [address] [facility] [address] [phone]           Bring a current list of meds and any records pertaining to this visit. For Physicals, please bring immunization records and any forms needing to be filled out. Please arrive 10 minutes early to complete paperwork.              Who to contact     If you have questions or need follow up information about today's clinic visit or your schedule please contact [facility] directly at [phone].  Normal or non-critical lab and imaging results will be communicated to you by MyChart, letter or phone within 4 business days after the clinic has received the results. If you do not hear from us within 7 days, please contact the clinic through MyChart or phone. If you have a critical or abnormal lab result, we will notify you by phone as soon as possible.  Submit refill requests through Get[employer] or call your pharmacy and they will forward the refill request to us. Please allow 3 business days for your refill to be completed.          Additional Information About Your Visit        GetSocial Information     GetSocial lets you send messages to your doctor, view your test results, renew your prescriptions, schedule appointments and more. To sign up, go to [email or web address] . Click on \"Log in\" on the left side of the screen, which will take you to the Welcome page. Then click on \"Sign up Now\" on the right side of the page.     You will be asked to enter the access code listed below, as well as some personal information. Please follow the directions to create your username and password.     Your access code is: " [email or web address]  Expires: 10/31/2018  3:57 PM     Your access code will  in 90 days. If you need help or a new code, please call your [facility] or [phone].        Care EveryWhere ID     This is your Care EveryWhere ID. This could be used by other organizations to access your [facility] medical records  [id number]        Your Vitals Were     Pulse Temperature Respirations Height Pulse Oximetry BMI (Body Mass Index)    70 97.9  F (36.6  C) (Oral) 16 5' 9\" (1.753 m) 100% 24.99 kg/m2       Blood Pressure from Last 3 Encounters:   18 116/72   18 126/86   17 (!) 142/100    Weight from Last 3 Encounters:   18 169 lb 3.2 oz (76.7 kg)   18 156 lb 4.8 oz (70.9 kg)   17 163 lb 8 oz (74.2 kg)              We Performed the Following     HPV High Risk Types DNA Cervical     Pap imaged thin layer screen with HPV - recommended age 30 - 65     Renal panel (Alb, BUN, Ca, Cl, CO2, Creat, Gluc, Phos, K, Na)          Today's Medication Changes          These changes are accurate as of 18  3:57 PM.  If you have any questions, ask your nurse or doctor.               Start taking these medicines.        Dose/Directions    triamcinolone 0.1 % cream   Commonly known as:  KENALOG   Used for:  Skin lesion   Started by:  Becca Trevizo MD        Apply sparingly to affected area three times daily as needed   Quantity:  45 g   Refills:  0            Where to get your medicines      These medications were sent to [facility] [address] [place], [place] - [address] [facility] [address]  [address] [facility] [address]     Phone:  [phone]     triamcinolone 0.1 % cream                Primary Care Provider Office Phone # Fax #    Becca Trevizo [phone] [address]  [facility] [address]        Equal Access to Services     RENAE BRIGGS AH: Joao Love, kelly mancini, anne-marie pantoja, lloyd arnold" michellekirstingrace prestonaarosaura ah. So [facility] [phone].    ATENCIÓN: Si thalia ro, tiene a garcia disposición servicios gratuitos de asistencia lingüística. Isa al [phone].    We comply with applicable federal civil rights laws and [place] laws. We do not discriminate on the basis of race, color, national origin, age, disability, sex, sexual orientation, or gender identity.            Thank you!     Thank you for choosing [facility]3] [place]  for your care. Our goal is always to provide you with excellent care. Hearing back from our patients is one way we can continue to improve our services. Please take a few minutes to complete the written survey that you may receive in the mail after your visit with us. Thank you!             Your Updated Medication List - Protect others around you: Learn how to safely use, store and throw away your medicines at www.disposemymeds.org.          This list is accurate as of 8/2/18  3:57 PM.  Always use your most recent med list.                   Brand Name Dispense Instructions for use Diagnosis    ALLEGRA ALLERGY 180 MG tablet   Generic drug:  fexofenadine      Take by mouth daily Reported on 5/22/2017        CRANBERRY PO      Take by mouth daily        FLUoxetine 20 MG capsule    PROzac    120 capsule    Take 1 capsule (20 mg) by mouth daily And increase to 40 (2x20 mg) mg during the premenstrual period of time.    Anxiety state       lisinopril 20 MG tablet    PRINIVIL/ZESTRIL    90 tablet    TAKE 1 TABLET(20 MG) BY MOUTH DAILY    Hypertension goal BP (blood pressure) < 140/90       LORazepam 0.5 MG tablet    ATIVAN    10 tablet    Take 0.5-1 tablets (0.25-0.5 mg) by mouth every 8 hours as needed for anxiety    Anxiety state       LORYNA 3-0.02 MG per tablet   Generic drug:  drospirenone-ethinyl estradiol      Take 1 tablet by mouth daily        SUMAtriptan 50 MG tablet    IMITREX    30 tablet    Take 1 tablet (50 mg) by mouth at onset of headache for migraine May repeat dose in  2 hours.  Do not exceed 200 mg in 24 hours    Migraine with aura and without status migrainosus, not intractable       triamcinolone 0.1 % cream    KENALOG    45 g    Apply sparingly to affected area three times daily as needed    Skin lesion       valACYclovir 500 MG tablet    VALTREX    24 tablet    TAKE ONE TABLET BY MOUTH TWICE DAILY AS NEEDED FOR 3 DAYS WITH FLARES    Herpes simplex virus (HSV) infection

## 2018-08-03 LAB
ALBUMIN SERPL-MCNC: 3.6 G/DL (ref 3.4–5)
ANION GAP SERPL CALCULATED.3IONS-SCNC: 6 MMOL/L (ref 3–14)
BUN SERPL-MCNC: 11 MG/DL (ref 7–30)
CALCIUM SERPL-MCNC: 8.2 MG/DL (ref 8.5–10.1)
CHLORIDE SERPL-SCNC: 105 MMOL/L (ref 94–109)
CO2 SERPL-SCNC: 29 MMOL/L (ref 20–32)
CREAT SERPL-MCNC: 0.76 MG/DL (ref 0.52–1.04)
GFR SERPL CREATININE-BSD FRML MDRD: 82 ML/MIN/1.7M2
GLUCOSE SERPL-MCNC: 80 MG/DL (ref 70–99)
PHOSPHATE SERPL-MCNC: 3.7 MG/DL (ref 2.5–4.5)
POTASSIUM SERPL-SCNC: 4.6 MMOL/L (ref 3.4–5.3)
SODIUM SERPL-SCNC: 140 MMOL/L (ref 133–144)

## 2018-08-07 LAB
COPATH REPORT: NORMAL
PAP: NORMAL

## 2018-08-08 LAB
FINAL DIAGNOSIS: NORMAL
HPV HR 12 DNA CVX QL NAA+PROBE: NEGATIVE
HPV16 DNA SPEC QL NAA+PROBE: NEGATIVE
HPV18 DNA SPEC QL NAA+PROBE: NEGATIVE
SPECIMEN DESCRIPTION: NORMAL
SPECIMEN SOURCE CVX/VAG CYTO: NORMAL

## 2018-10-22 DIAGNOSIS — I10 HYPERTENSION GOAL BP (BLOOD PRESSURE) < 140/90: ICD-10-CM

## 2018-10-22 NOTE — TELEPHONE ENCOUNTER
"Requested Prescriptions   Pending Prescriptions Disp Refills     lisinopril (PRINIVIL/ZESTRIL) 20 MG tablet [Pharmacy Med Name: LISINOPRIL 20MG TABLETS]  Last Written Prescription Date:  4/9/18  Last Fill Quantity: 90,  # refills: 1   Last office visit: 8/2/2018 with prescribing provider:  Becca Trevizo MD    Future Office Visit:   Next 5 appointments (look out 90 days)     Oct 22, 2018 10:50 AM CDT   Office Visit with Becca Trevizo MD   [address] [address] [phone]                  90 tablet 0     Sig: TAKE 1 TABLET(20 MG) BY MOUTH DAILY    ACE Inhibitors (Including Combos) Protocol Passed    10/22/2018  8:31 AM       Passed - Blood pressure under 140/90 in past 12 months    BP Readings from Last 3 Encounters:   08/02/18 116/72   04/09/18 126/86   12/19/17 (!) 142/100                Passed - Recent (12 mo) or future (30 days) visit within the authorizing provider's specialty    Patient had office visit in the last 12 months or has a visit in the next 30 days with authorizing provider or within the authorizing provider's specialty.  See \"Patient Info\" tab in inbasket, or \"Choose Columns\" in Meds & Orders section of the refill encounter.             Passed - Patient is age 18 or older       Passed - No active pregnancy on record       Passed - Normal serum creatinine on file in past 12 months    Recent Labs   Lab Test  08/02/18   1611   CR  0.76            Passed - Normal serum potassium on file in past 12 months    Recent Labs   Lab Test  08/02/18   1611   POTASSIUM  4.6            Passed - No positive pregnancy test in past 12 months          "

## 2018-10-24 RX ORDER — LISINOPRIL 20 MG/1
TABLET ORAL
Qty: 90 TABLET | Refills: 2 | Status: SHIPPED | OUTPATIENT
Start: 2018-10-24 | End: 2019-08-19

## 2018-11-24 DIAGNOSIS — F41.1 ANXIETY STATE: ICD-10-CM

## 2018-11-24 NOTE — TELEPHONE ENCOUNTER
"Requested Prescriptions   Pending Prescriptions Disp Refills     FLUoxetine (PROZAC) 20 MG capsule [Pharmacy Med Name: FLUOXETINE 20MG CAPSULES]  Last Written Prescription Date:  4/9/18  Last Fill Quantity: 120,  # refills: 1   Last office visit: 8/2/2018 with prescribing provider:  8/2/18   Future Office Visit:     120 capsule 0     Sig: TAKE 1 CAPSULE BY MOUTH DAILY AND INCREASE TO 2 CAPSULES BY MOUTH DURING THE PREMENSTRUAL PERIOD OF TIME    SSRIs Protocol Passed    11/24/2018 10:11 AM       Passed - Recent (12 mo) or future (30 days) visit within the authorizing provider's specialty    Patient had office visit in the last 12 months or has a visit in the next 30 days with authorizing provider or within the authorizing provider's specialty.  See \"Patient Info\" tab in inbasket, or \"Choose Columns\" in Meds & Orders section of the refill encounter.             Passed - Patient is age 18 or older       Passed - No active pregnancy on record       Passed - No positive pregnancy test in last 12 months          "

## 2018-11-26 DIAGNOSIS — F41.1 ANXIETY STATE: ICD-10-CM

## 2018-11-26 NOTE — TELEPHONE ENCOUNTER
"Requested Prescriptions   Pending Prescriptions Disp Refills     FLUoxetine (PROZAC) 20 MG capsule [Pharmacy Med Name: FLUOXETINE 20MG CAPSULES]  Last Written Prescription Date:  4/9/18  Last Fill Quantity: 120,  # refills: 1   Last office visit: 8/2/2018 with prescribing provider:  Becca Trevizo MD    Future Office Visit:     120 capsule 0     Sig: TAKE 1 CAPSULE BY MOUTH DAILY AND INCREASE TO 2 CAPSULES BY MOUTH DURING THE PREMENSTRUAL PERIOD OF TIME    SSRIs Protocol Passed    11/26/2018  8:49 AM  PHQ-9 SCORE 5/22/2017 12/19/2017 4/9/2018   Total Score - - -   Total Score MyChart - - -   Total Score 2 11 5     HAO-7 SCORE 5/22/2017 12/19/2017 4/9/2018   Total Score - - -   Total Score 3 11 6              Passed - Recent (12 mo) or future (30 days) visit within the authorizing provider's specialty    Patient had office visit in the last 12 months or has a visit in the next 30 days with authorizing provider or within the authorizing provider's specialty.  See \"Patient Info\" tab in inbasket, or \"Choose Columns\" in Meds & Orders section of the refill encounter.             Passed - Patient is age 18 or older       Passed - No active pregnancy on record       Passed - No positive pregnancy test in last 12 months          "

## 2018-11-27 DIAGNOSIS — F41.1 ANXIETY STATE: ICD-10-CM

## 2018-11-27 NOTE — TELEPHONE ENCOUNTER
"Requested Prescriptions   Pending Prescriptions Disp Refills     FLUoxetine (PROZAC) 20 MG capsule [Pharmacy Med Name: FLUOXETINE 20MG CAPSULES]  Last Written Prescription Date:  11/26/18  Last Fill Quantity: 120,  # refills: 0   Last office visit: 8/2/2018 with prescribing provider:  Becca Trevizo MD   Future Office Visit:     120 capsule 0     Sig: TAKE 1 CAPSULE BY MOUTH DAILY AND INCREASE TO 2 CAPSULES BY MOUTH DURING THE PREMENSTRUAL PERIOD OF TIME    SSRIs Protocol Passed    11/27/2018  8:46 AM  PHQ-9 SCORE 5/22/2017 12/19/2017 4/9/2018   PHQ-9 Total Score - - -   PHQ-9 Total Score MyChart - - -   PHQ-9 Total Score 2 11 5     HAO-7 SCORE 5/22/2017 12/19/2017 4/9/2018   Total Score - - -   Total Score 3 11 6              Passed - Recent (12 mo) or future (30 days) visit within the authorizing provider's specialty    Patient had office visit in the last 12 months or has a visit in the next 30 days with authorizing provider or within the authorizing provider's specialty.  See \"Patient Info\" tab in inbasket, or \"Choose Columns\" in Meds & Orders section of the refill encounter.             Passed - Patient is age 18 or older       Passed - No active pregnancy on record       Passed - No positive pregnancy test in last 12 months          "

## 2018-11-28 DIAGNOSIS — F41.1 ANXIETY STATE: ICD-10-CM

## 2018-11-29 NOTE — TELEPHONE ENCOUNTER
"Requested Prescriptions   Pending Prescriptions Disp Refills     FLUoxetine (PROZAC) 20 MG capsule [Pharmacy Med Name: FLUOXETINE 20MG CAPSULES]  Last Written Prescription Date:  11/26/18  Last Fill Quantity: 120,  # refills: 0   Last office visit: 8/2/2018 with prescribing provider:  Becca Trevizo MD    Future Office Visit:     120 capsule 0     Sig: TAKE 1 CAPSULE BY MOUTH DAILY AND INCREASE TO 2 CAPSULES BY MOUTH DURING THE PREMENSTRUAL PERIOD OF TIME    SSRIs Protocol Passed    11/28/2018 10:36 AM  PHQ-9 SCORE 5/22/2017 12/19/2017 4/9/2018   PHQ-9 Total Score - - -   PHQ-9 Total Score MyChart - - -   PHQ-9 Total Score 2 11 5     HAO-7 SCORE 5/22/2017 12/19/2017 4/9/2018   Total Score - - -   Total Score 3 11 6              Passed - Recent (12 mo) or future (30 days) visit within the authorizing provider's specialty    Patient had office visit in the last 12 months or has a visit in the next 30 days with authorizing provider or within the authorizing provider's specialty.  See \"Patient Info\" tab in inbasket, or \"Choose Columns\" in Meds & Orders section of the refill encounter.             Passed - Patient is age 18 or older       Passed - No active pregnancy on record       Passed - No positive pregnancy test in last 12 months          "

## 2018-11-30 ENCOUNTER — RADIANT APPOINTMENT (OUTPATIENT)
Dept: MAMMOGRAPHY | Facility: CLINIC | Age: 48
End: 2018-11-30
Attending: FAMILY MEDICINE
Payer: COMMERCIAL

## 2018-11-30 DIAGNOSIS — Z12.31 VISIT FOR SCREENING MAMMOGRAM: ICD-10-CM

## 2018-11-30 PROCEDURE — 77067 SCR MAMMO BI INCL CAD: CPT | Mod: TC

## 2018-12-01 NOTE — TELEPHONE ENCOUNTER
Patient has refills remaining with pharmacy.  Melissa Gomez RN - Triage  [facility]

## 2019-02-03 DIAGNOSIS — G43.109 MIGRAINE WITH AURA AND WITHOUT STATUS MIGRAINOSUS, NOT INTRACTABLE: ICD-10-CM

## 2019-02-03 DIAGNOSIS — F41.1 ANXIETY STATE: ICD-10-CM

## 2019-02-03 NOTE — TELEPHONE ENCOUNTER
"Requested Prescriptions   Pending Prescriptions Disp Refills     SUMAtriptan (IMITREX) 50 MG tablet [Pharmacy Med Name: SUMATRIPTAN 50MG TABLETS]  Last Written Prescription Date:  12/19/2017  Last Fill Quantity: 30 tablet,  # refills: 0   Last Office Visit: 8/2/2018   Future Office Visit:      30 tablet 0     Sig: TAKE 1 TABLET(50 MG) BY MOUTH AT ONSET OF HEADACHE FOR MIGRAINE. MAY REPEAT DOSE IN 2 HOURS. DO NOT EXCEED 200 MG IN 24 HOURS    Serotonin Agonists Failed - 2/3/2019  3:47 PM       Failed - Serotonin Agonist request needs review.    Please review patient's record. If patient has had 8 or more treatments in the past month, please forward to provider.         Passed - Blood pressure under 140/90 in past 12 months    BP Readings from Last 3 Encounters:   08/02/18 116/72   04/09/18 126/86   12/19/17 (!) 142/100                Passed - Recent (12 mo) or future (30 days) visit within the authorizing provider's specialty    Patient had office visit in the last 12 months or has a visit in the next 30 days with authorizing provider or within the authorizing provider's specialty.  See \"Patient Info\" tab in inbasket, or \"Choose Columns\" in Meds & Orders section of the refill encounter.             Passed - Medication is active on med list       Passed - Patient is age 18 or older       Passed - No active pregnancy on record       Passed - No positive pregnancy test in past 12 months        FLUoxetine (PROZAC) 20 MG capsule [Pharmacy Med Name: FLUOXETINE 20MG CAPSULES]  Last Written Prescription Date:  11/26/2018  Last Fill Quantity: 120 capsule,  # refills: 0   Last Office Visit: 8/2/2018   Future Office Visit:      120 capsule 0     Sig: TAKE 1 CAPSULE BY MOUTH DAILY AND INCREASE TO 2 CAPSULES BY MOUTH DURING THE PREMENSTRUAL PERIOD OF TIME    SSRIs Protocol Passed - 2/3/2019  3:47 PM       Passed - Recent (12 mo) or future (30 days) visit within the authorizing provider's specialty    Patient had office visit in " "the last 12 months or has a visit in the next 30 days with authorizing provider or within the authorizing provider's specialty.  See \"Patient Info\" tab in inbasket, or \"Choose Columns\" in Meds & Orders section of the refill encounter.             Passed - Medication is active on med list       Passed - Patient is age 18 or older       Passed - No active pregnancy on record       Passed - No positive pregnancy test in last 12 months          "

## 2019-02-05 RX ORDER — SUMATRIPTAN 50 MG/1
TABLET, FILM COATED ORAL
Qty: 30 TABLET | Refills: 0 | Status: SHIPPED | OUTPATIENT
Start: 2019-02-05 | End: 2019-09-27

## 2019-02-05 NOTE — TELEPHONE ENCOUNTER
Prescription approved per [facility] Refill Protocol.    Anna ROMAN RN

## 2019-06-14 DIAGNOSIS — B00.9 HERPES SIMPLEX VIRUS (HSV) INFECTION: ICD-10-CM

## 2019-06-17 RX ORDER — VALACYCLOVIR HYDROCHLORIDE 500 MG/1
TABLET, FILM COATED ORAL
Qty: 24 TABLET | Refills: 0 | Status: SHIPPED | OUTPATIENT
Start: 2019-06-17 | End: 2019-09-27

## 2019-06-17 NOTE — TELEPHONE ENCOUNTER
"Requested Prescriptions   Pending Prescriptions Disp Refills     valACYclovir (VALTREX) 500 MG tablet [Pharmacy Med Name: VALACYCLOVIR 500MG TABLETS]  Last Written Prescription Date:  12/22/17  Last Fill Quantity: 24,  # refills: 3    Last office visit: 8/2/2018 with prescribing provider:  Becca Trevizo MD       Future Office Visit:     24 tablet 0     Sig: TAKE ONE TABLET BY MOUTH TWICE DAILY AS NEEDED FOR 3 DAYS WITH FLARES       Antivirals for Herpes Protocol Passed - 6/14/2019 10:31 PM        Passed - Patient is age 12 or older        Passed - Recent (12 mo) or future (30 days) visit within the authorizing provider's specialty     Patient had office visit in the last 12 months or has a visit in the next 30 days with authorizing provider or within the authorizing provider's specialty.  See \"Patient Info\" tab in inbasket, or \"Choose Columns\" in Meds & Orders section of the refill encounter.              Passed - Medication is active on med list        Passed - Normal serum creatinine on file in past 12 months     Recent Labs   Lab Test 08/02/18  1611   CR 0.76               "

## 2019-06-17 NOTE — TELEPHONE ENCOUNTER
Routing refill request to provider for review/approval because:  A break in medication- last prescription 12/22/2017  Chante Mon, RN, BSN

## 2019-08-19 DIAGNOSIS — I10 HYPERTENSION GOAL BP (BLOOD PRESSURE) < 140/90: ICD-10-CM

## 2019-08-19 NOTE — TELEPHONE ENCOUNTER
"Requested Prescriptions   Pending Prescriptions Disp Refills     lisinopril (PRINIVIL/ZESTRIL) 20 MG tablet [Pharmacy Med Name: LISINOPRIL 20MG TABLETS] 90 tablet 0     Sig: TAKE 1 TABLET(20 MG) BY MOUTH DAILY   Last Written Prescription Date:  10/24/18  Last Fill Quantity: 90,  # refills: 2   Last office visit: 8/2/2018 with prescribing provider:  Becca Trevizo MD   Future Office Visit:   Next 5 appointments (look out 90 days)    Sep 27, 2019 11:30 AM CDT  PHYSICAL with Becca Trevizo MD  [facility] [address] [facility] [address] [address] [phone]             ACE Inhibitors (Including Combos) Protocol Failed - 8/19/2019  1:12 PM        Failed - Blood pressure under 140/90 in past 12 months     BP Readings from Last 3 Encounters:   08/02/18 116/72   04/09/18 126/86   12/19/17 (!) 142/100                 Failed - Recent (12 mo) or future (30 days) visit within the authorizing provider's specialty     Patient had office visit in the last 12 months or has a visit in the next 30 days with authorizing provider or within the authorizing provider's specialty.  See \"Patient Info\" tab in inbasket, or \"Choose Columns\" in Meds & Orders section of the refill encounter.              Failed - Normal serum creatinine on file in past 12 months     Recent Labs   Lab Test 08/02/18  1611   CR 0.76             Failed - Normal serum potassium on file in past 12 months     Recent Labs   Lab Test 08/02/18  1611   POTASSIUM 4.6             Passed - Medication is active on med list        Passed - Patient is age 18 or older        Passed - No active pregnancy on record        Passed - No positive pregnancy test within past 12 months          "

## 2019-08-20 RX ORDER — LISINOPRIL 20 MG/1
TABLET ORAL
Qty: 90 TABLET | Refills: 0 | Status: SHIPPED | OUTPATIENT
Start: 2019-08-20 | End: 2019-09-27

## 2019-08-20 NOTE — TELEPHONE ENCOUNTER
Pending appointment with PCP     Next 5 appointments (look out 90 days)    Sep 27, 2019 11:30 AM CDT  PHYSICAL with [facility]izo MD  [facility] [address] [facility] [address] [phone]          Rx refilled per [facility] refill protocol.    Chasidy Lo BSN, RN

## 2019-09-27 ENCOUNTER — OFFICE VISIT (OUTPATIENT)
Dept: FAMILY MEDICINE | Facility: CLINIC | Age: 49
End: 2019-09-27
Payer: COMMERCIAL

## 2019-09-27 VITALS
TEMPERATURE: 97.9 F | RESPIRATION RATE: 16 BRPM | OXYGEN SATURATION: 100 % | HEIGHT: 69 IN | SYSTOLIC BLOOD PRESSURE: 120 MMHG | WEIGHT: 192.4 LBS | HEART RATE: 69 BPM | BODY MASS INDEX: 28.5 KG/M2 | DIASTOLIC BLOOD PRESSURE: 76 MMHG

## 2019-09-27 DIAGNOSIS — Z00.00 ROUTINE GENERAL MEDICAL EXAMINATION AT A HEALTH CARE FACILITY: Primary | ICD-10-CM

## 2019-09-27 DIAGNOSIS — B00.9 HERPES SIMPLEX VIRUS (HSV) INFECTION: ICD-10-CM

## 2019-09-27 DIAGNOSIS — N95.1 PERIMENOPAUSE: ICD-10-CM

## 2019-09-27 DIAGNOSIS — G43.109 MIGRAINE WITH AURA AND WITHOUT STATUS MIGRAINOSUS, NOT INTRACTABLE: ICD-10-CM

## 2019-09-27 DIAGNOSIS — I10 HYPERTENSION GOAL BP (BLOOD PRESSURE) < 140/90: ICD-10-CM

## 2019-09-27 DIAGNOSIS — Z23 NEED FOR PROPHYLACTIC VACCINATION AND INOCULATION AGAINST INFLUENZA: ICD-10-CM

## 2019-09-27 DIAGNOSIS — R40.0 DAYTIME SLEEPINESS: ICD-10-CM

## 2019-09-27 DIAGNOSIS — G47.00 INSOMNIA, UNSPECIFIED TYPE: ICD-10-CM

## 2019-09-27 DIAGNOSIS — F41.1 ANXIETY STATE: ICD-10-CM

## 2019-09-27 LAB
ANION GAP SERPL CALCULATED.3IONS-SCNC: 6 MMOL/L (ref 3–14)
BUN SERPL-MCNC: 15 MG/DL (ref 7–30)
CALCIUM SERPL-MCNC: 8.7 MG/DL (ref 8.5–10.1)
CHLORIDE SERPL-SCNC: 106 MMOL/L (ref 94–109)
CO2 SERPL-SCNC: 27 MMOL/L (ref 20–32)
CREAT SERPL-MCNC: 0.72 MG/DL (ref 0.52–1.04)
GFR SERPL CREATININE-BSD FRML MDRD: >90 ML/MIN/{1.73_M2}
GLUCOSE SERPL-MCNC: 67 MG/DL (ref 70–99)
POTASSIUM SERPL-SCNC: 3.5 MMOL/L (ref 3.4–5.3)
SODIUM SERPL-SCNC: 139 MMOL/L (ref 133–144)

## 2019-09-27 PROCEDURE — 90686 IIV4 VACC NO PRSV 0.5 ML IM: CPT | Performed by: FAMILY MEDICINE

## 2019-09-27 PROCEDURE — 80048 BASIC METABOLIC PNL TOTAL CA: CPT | Performed by: FAMILY MEDICINE

## 2019-09-27 PROCEDURE — 99213 OFFICE O/P EST LOW 20 MIN: CPT | Mod: 25 | Performed by: FAMILY MEDICINE

## 2019-09-27 PROCEDURE — 90471 IMMUNIZATION ADMIN: CPT | Performed by: FAMILY MEDICINE

## 2019-09-27 PROCEDURE — 36415 COLL VENOUS BLD VENIPUNCTURE: CPT | Performed by: FAMILY MEDICINE

## 2019-09-27 PROCEDURE — 82043 UR ALBUMIN QUANTITATIVE: CPT | Performed by: FAMILY MEDICINE

## 2019-09-27 PROCEDURE — 99396 PREV VISIT EST AGE 40-64: CPT | Mod: 25 | Performed by: FAMILY MEDICINE

## 2019-09-27 RX ORDER — LISINOPRIL 20 MG/1
20 TABLET ORAL DAILY
Qty: 90 TABLET | Refills: 3 | Status: SHIPPED | OUTPATIENT
Start: 2019-09-27 | End: 2020-10-05

## 2019-09-27 RX ORDER — VALACYCLOVIR HYDROCHLORIDE 500 MG/1
TABLET, FILM COATED ORAL
Qty: 24 TABLET | Refills: 0 | Status: SHIPPED | OUTPATIENT
Start: 2019-09-27 | End: 2021-01-25

## 2019-09-27 RX ORDER — FEXOFENADINE HCL 180 MG/1
180 TABLET ORAL DAILY PRN
COMMUNITY
Start: 2019-09-27 | End: 2024-04-03

## 2019-09-27 RX ORDER — SUMATRIPTAN 50 MG/1
TABLET, FILM COATED ORAL
Qty: 30 TABLET | Refills: 0 | Status: SHIPPED | OUTPATIENT
Start: 2019-09-27 | End: 2020-06-29

## 2019-09-27 ASSESSMENT — ENCOUNTER SYMPTOMS
CHILLS: 0
EYE PAIN: 0
HEADACHES: 1
ARTHRALGIAS: 1
FREQUENCY: 0
HEMATOCHEZIA: 0
MYALGIAS: 1
WEAKNESS: 0
DIARRHEA: 0
FEVER: 0
DIZZINESS: 1
SORE THROAT: 0
SHORTNESS OF BREATH: 0
JOINT SWELLING: 0
HEMATURIA: 0
NAUSEA: 0
PALPITATIONS: 0
NERVOUS/ANXIOUS: 1
HEARTBURN: 0
ABDOMINAL PAIN: 0
DYSURIA: 0
CONSTIPATION: 0
COUGH: 0
PARESTHESIAS: 1

## 2019-09-27 ASSESSMENT — PATIENT HEALTH QUESTIONNAIRE - PHQ9
5. POOR APPETITE OR OVEREATING: SEVERAL DAYS
SUM OF ALL RESPONSES TO PHQ QUESTIONS 1-9: 2

## 2019-09-27 ASSESSMENT — ANXIETY QUESTIONNAIRES
3. WORRYING TOO MUCH ABOUT DIFFERENT THINGS: NOT AT ALL
5. BEING SO RESTLESS THAT IT IS HARD TO SIT STILL: NOT AT ALL
IF YOU CHECKED OFF ANY PROBLEMS ON THIS QUESTIONNAIRE, HOW DIFFICULT HAVE THESE PROBLEMS MADE IT FOR YOU TO DO YOUR WORK, TAKE CARE OF THINGS AT HOME, OR GET ALONG WITH OTHER PEOPLE: SOMEWHAT DIFFICULT
6. BECOMING EASILY ANNOYED OR IRRITABLE: MORE THAN HALF THE DAYS
GAD7 TOTAL SCORE: 4
2. NOT BEING ABLE TO STOP OR CONTROL WORRYING: NOT AT ALL
1. FEELING NERVOUS, ANXIOUS, OR ON EDGE: SEVERAL DAYS
7. FEELING AFRAID AS IF SOMETHING AWFUL MIGHT HAPPEN: NOT AT ALL

## 2019-09-27 ASSESSMENT — MIFFLIN-ST. JEOR: SCORE: 1571.06

## 2019-09-27 NOTE — PATIENT INSTRUCTIONS
Preventive Health Recommendations  Female Ages 40 to 49    Yearly exam:     See your health care provider every year in order to  1. Review health changes.   2. Discuss preventive care.    3. Review your medicines if your doctor prescribed any.      Get a Pap test every three years (unless you have an abnormal result and your provider advises testing more often).      If you get Pap tests with HPV test, you only need to test every 5 years, unless you have an abnormal result. You do not need a Pap test if your uterus was removed (hysterectomy) and you have not had cancer.      You should be tested each year for STDs (sexually transmitted diseases), if you're at risk.     Ask your doctor if you should have a mammogram.      Have a colonoscopy (test for colon cancer) if someone in your family has had colon cancer or polyps before age 50.       Have a cholesterol test every 5 years.       Have a diabetes test (fasting glucose) after age 45. If you are at risk for diabetes, you should have this test every 3 years.    Shots: Get a flu shot each year. Get a tetanus shot every 10 years.     Nutrition:     Eat at least 5 servings of fruits and vegetables each day.    Eat whole-grain bread, whole-wheat pasta and brown rice instead of white grains and rice.    Get adequate Calcium and Vitamin D.      Lifestyle    Exercise at least 150 minutes a week (an average of 30 minutes a day, 5 days a week). This will help you control your weight and prevent disease.    Limit alcohol to one drink per day.    No smoking.     Wear sunscreen to prevent skin cancer.    See your dentist every six months for an exam and cleaning.        ---------------------------------    I do recommend going in for a skin check.    Your last mammogram was November 30, 2018.

## 2019-09-27 NOTE — LETTER
September 30, 2019      Nanette DAVID Hortencia  [address] [facility] [id number]        Dear MsMargi Nanette Burnett,    Enclosed is a copy of your recent results.  These are looking good.     The urine albumin to creatinine ratio serves as a marker of higher risk of cardiovascular and kidney disease.  If this is elevated, we typically like to see you on either an ACE inhibitor, or an ARB.   We are also getting more aggressive with both blood pressure and cholesterol goals.   (The number we are looking at is the ratio; this usually appears at the bottom of the report with a normal range 0-25).    GFR stands for glomerular filtration rate (this is in regards to the kidney). They are now showing an estimate of this, based on the actual creatinine, age, gender, and race. This is commonly lower as one gets older. Your level of hydration can have an impact also.  If this is low, we should be aggressive with managing high blood pressure or high cholesterol.    It was nice seeing you the other day!    Sincerely,     Becca Trevizo MD

## 2019-09-27 NOTE — PROGRESS NOTES
SUBJECTIVE:   CC: Nanette Burnett is an 49 year old woman who presents for preventive health visit.     Healthy Habits:     Getting at least 3 servings of Calcium per day:  Yes    Bi-annual eye exam:  Yes    Dental care twice a year:  NO    Sleep apnea or symptoms of sleep apnea:  Daytime drowsiness    Diet:  Regular (no restrictions)    Duration of exercise:  30-45 minutes    Taking medications regularly:  Yes    Medication side effects:  None    PHQ-2 Total Score: 0    Additional concerns today:  No        Today's PHQ-2 Score:   PHQ-2 ( 1999 Pf[employer]) 9/27/2019   Q1: Little interest or pleasure in doing things 0   Q2: Feeling down, depressed or hopeless 0   PHQ-2 Score 0   Q1: Little interest or pleasure in doing things Not at all   Q2: Feeling down, depressed or hopeless Not at all   PHQ-2 Score 0       Abuse: Current or Past(Physical, Sexual or Emotional)- No  Do you feel safe in your environment? Yes    Social History     Tobacco Use     Smoking status: Never Smoker     Smokeless tobacco: Never Used     Tobacco comment: occasionally in the past - social   Substance Use Topics     Alcohol use: Yes     Alcohol/week: 0.0 - 1.0 standard drinks         Alcohol Use 9/27/2019   Prescreen: >3 drinks/day or >7 drinks/week? No   Prescreen: >3 drinks/day or >7 drinks/week? -       Reviewed orders with patient.  Reviewed health maintenance and updated orders accordingly - Yes          Pertinent mammograms are reviewed under the imaging tab.  History of abnormal Pap smear: YES - updated in Problem List and Health Maintenance accordingly  PAP / HPV Latest Ref Rng & Units 8/2/2018 10/7/2016 9/10/2015   PAP - NIL NIL NIL   HPV 16 DNA NEG:Negative Negative Negative -   HPV 18 DNA NEG:Negative Negative Negative -   OTHER HR HPV NEG:Negative Negative Negative -     Reviewed and updated as needed this visit by clinical staff         Reviewed and updated as needed this visit by Provider        Patient Active Problem List   Diagnosis      Herpes simplex virus (HSV) infection     Anxiety state     Allergic state     External hemorrhoids     CARDIOVASCULAR SCREENING; LDL GOAL LESS THAN 160     Dysplasia of cervix     Migraine     Health Care Home     Anemia, unspecified type     Hypertension goal BP (blood pressure) < 140/90       Past Medical History:   Diagnosis Date     Allergy, unspecified not elsewhere classified     Hayfever, better in the early      Anxiety state, unspecified      Dysplastic nevus     near toe. another suspicious mole right thigh. sees Derm     Genital herpes, unspecified     about 2 flares per year     Insomnia, unspecified      Migraine, unspecified, without mention of intractable migraine without mention of status migrainosus     will have some with aura; has others       Past Surgical History:   Procedure Laterality Date     ARTHROSCOPY KNEE Right 2016    lateral and medial meniscal tear     C NONSPECIFIC PROCEDURE  age 6    s/p tonsillectomy & adenoidectomy     C NONSPECIFIC PROCEDURE      L knee surgery ACL-arthroscopic/open     C NONSPECIFIC PROCEDURE      vein stripping x 2     C NONSPECIFIC PROCEDURE      R ACL surgery     C NONSPECIFIC PROCEDURE      s/p L inguinal hernia repair     C NONSPECIFIC PROCEDURE      abnormal pap-TCA     C NONSPECIFIC PROCEDURE      bunion surgery L     C NONSPECIFIC PROCEDURE      Bilateral saphenous vein     C/SECTION, LOW TRANSVERSE  3/05    , Low Transverse     C/SECTION, LOW TRANSVERSE       HERNIORRHAPHY INGUINAL  2012    Procedure: HERNIORRHAPHY INGUINAL;;  Surgeon: Tray Valenzuela MD;  Location: [facility] OR     LAPAROSCOPIC TUBAL LIGATION  2012    Procedure: LAPAROSCOPIC TUBAL LIGATION;  LAPAROSCOPIC TUBAL LIGATION, Right Inguinal Hernia Repair with Mesh ;  Surgeon: Gerson Burrell MD;  Location: [facility] OR       OB History    Para Term  AB Living   2 2 2 0 0 2   SAB TAB Ectopic Multiple Live Births   0 0  0 0 2      # Outcome Date GA Lbr Liam/2nd Weight Sex Delivery Anes PTL Lv   2 Term 11/17/08 39w0d  3.629 kg (8 lb) F CS-Unspec EPI N THOMAS      Birth Comments: elective repeat      Apgar1: 8  Apgar5: 9   1 Term 03/23/05 39w0d  3.572 kg (7 lb 14 oz) F CS EPIDURAL  THOMAS      Birth Comments: breech      Name: Lexy       Current Outpatient Medications   Medication Sig Dispense Refill     fexofenadine (ALLEGRA ALLERGY) 180 MG tablet Take 1 tablet (180 mg) by mouth daily as needed for allergies (allergies (Fall)) Reported on 5/22/2017       FLUoxetine (PROZAC) 20 MG capsule Take 1 capsule (20 mg) by mouth daily 90 capsule 1     lisinopril (PRINIVIL/ZESTRIL) 20 MG tablet Take 1 tablet (20 mg) by mouth daily 90 tablet 3     LORazepam (ATIVAN) 0.5 MG tablet Take 0.5-1 tablets (0.25-0.5 mg) by mouth every 8 hours as needed for anxiety 10 tablet 0     SUMAtriptan (IMITREX) 50 MG tablet TAKE 1 TABLET(50 MG) BY MOUTH AT ONSET OF HEADACHE FOR MIGRAINE. MAY REPEAT DOSE IN 2 HOURS. DO NOT EXCEED 200 MG IN 24 HOURS 30 tablet 0     triamcinolone (KENALOG) 0.1 % cream Apply sparingly to affected area three times daily as needed 45 g 0     valACYclovir (VALTREX) 500 MG tablet TAKE ONE TABLET BY MOUTH TWICE DAILY AS NEEDED FOR 3 DAYS WITH FLARES 24 tablet 0       Family History   Problem Relation Age of Onset     Cancer Other         Prostate CA     Hypertension Father      Neurologic Disorder Father         ALS     Thyroid Disease Mother         actually hypoglycemia; not thyroid.     Hypertension Maternal Grandmother      Cerebrovascular Disease Maternal Grandmother      Cancer Maternal Grandmother         ovarian/uterine dx at age 50's     Breast Cancer Paternal Grandmother         dx at age 60's       Social History     Tobacco Use     Smoking status: Never Smoker     Smokeless tobacco: Never Used     Tobacco comment: occasionally in the past - social   Substance Use Topics     Alcohol use: Yes     Alcohol/week: 0.0 - 1.0 standard  "drinks       Immunization History   Administered Date(s) Administered     Influenza (IIV3) PF 10/19/2010, 10/24/2012     Influenza Vaccine IM > 6 months Valent IIV4 11/19/2013, 10/21/2014, 10/07/2016, 11/27/2017, 09/27/2019     TD (ADULT, 7+) 08/06/1998, 11/01/2005     TDAP Vaccine (Adacel) 11/02/2011         Review of Systems   Constitutional: Negative for chills and fever.   HENT: Negative for congestion, ear pain, hearing loss and sore throat.    Eyes: Negative for pain and visual disturbance.   Respiratory: Negative for cough and shortness of breath.    Cardiovascular: Negative for chest pain, palpitations and peripheral edema.   Gastrointestinal: Negative for abdominal pain, constipation, diarrhea, heartburn, hematochezia and nausea.   Genitourinary: Negative for dysuria, frequency, genital sores, hematuria and urgency.   Musculoskeletal: Positive for arthralgias and myalgias. Negative for joint swelling.   Skin: Negative for rash.   Neurological: Positive for dizziness, headaches and paresthesias. Negative for weakness.   Psychiatric/Behavioral: Positive for mood changes. The patient is nervous/anxious.      Not staying asleep at night.   Does feel tired in am and during the day.   Notes she wakes up thinking and needs to turn her mind off.    Has had menses only twice since last January.   Last was early this month. Does get some daytime hot flashes; not much at night.        OBJECTIVE:   /76 (BP Location: Right arm, Cuff Size: Adult Regular)   Pulse 69   Temp 97.9  F (36.6  C) (Oral)   Resp 16   Ht 1.759 m (5' 9.25\")   Wt 87.3 kg (192 lb 6.4 oz)   SpO2 100%   BMI 28.21 kg/m    Physical Exam  GENERAL APPEARANCE: healthy, alert and no distress  EYES: Eyes grossly normal to inspection, PERRL and conjunctivae and sclerae normal  HENT: ear canals and TM's normal, nose and mouth without ulcers or lesions, oropharynx clear and oral mucous membranes moist  NECK: no adenopathy, no asymmetry, masses, or " scars and thyroid normal to palpation  RESP: lungs clear to auscultation - no rales, rhonchi or wheezes  BREAST: normal without masses, tenderness or nipple discharge and no palpable axillary masses or adenopathy  CV: regular rates and rhythm and no peripheral edema  ABDOMEN: soft, nontender, no hepatosplenomegaly, no masses and bowel sounds normal  MS: no musculoskeletal defects are noted and gait is age appropriate without ataxia  SKIN: no suspicious lesions or rashes  NEURO: Normal strength and tone, sensory exam grossly normal, mentation intact and speech normal  PSYCH: mentation appears normal and affect normal/bright      PHQ-9 SCORE 12/19/2017 4/9/2018 9/27/2019   PHQ-9 Total Score - - -   PHQ-9 Total Score MyChart - - -   PHQ-9 Total Score 11 5 2       HAO-7 SCORE 12/19/2017 4/9/2018 9/27/2019   Total Score - - -   Total Score 11 6 4         Recent Labs   Lab Test 09/10/15  0954 10/24/14  0812   CHOL 148 161   HDL 97 90   LDL 42 60   TRIG 45 53   CHOLHDLRATIO 1.5 1.8       GFR Estimate   Date Value Ref Range Status   08/02/2018 82 >60 mL/min/1.7m2 Final     Comment:     Non  GFR Calc   04/09/2018 89 >60 mL/min/1.7m2 Final     Comment:     Non  GFR Calc   12/19/2017 >90 >60 mL/min/1.7m2 Final     Comment:     Non  GFR Calc               ASSESSMENT/PLAN:   1. Routine general medical examination at a health care facility      2. Insomnia, unspecified type  Discussed some options. She is not concerned for sleep apnea; sounds like it may be some of the anxiety keeping her up.  Could consider increasing fluoxetine.  Discussed CBT. Main issue is around sleep; she may be interested in pursuing this through sleep clinic.   - SLEEP EVALUATION & MANAGEMENT REFERRAL - [facility]  [phone] (Age 18 and up); Future    3. Daytime sleepiness  As above.   - SLEEP EVALUATION & MANAGEMENT REFERRAL - [facility]   "[phone] (Age 18 and up); Future    4. Hypertension goal BP (blood pressure) < 140/90  Stable; refilling.   - lisinopril (PRINIVIL/ZESTRIL) 20 MG tablet; Take 1 tablet (20 mg) by mouth daily  Dispense: 90 tablet; Refill: 3  - Basic metabolic panel  - Albumin Random Urine Quantitative with Creat Ratio    5. Anxiety state  Refilling; see above. Other than sleep, she is doing well.  - FLUoxetine (PROZAC) 20 MG capsule; Take 1 capsule (20 mg) by mouth daily  Dispense: 90 capsule; Refill: 1    6. Migraine with aura and without status migrainosus, not intractable  Refilling.   - SUMAtriptan (IMITREX) 50 MG tablet; TAKE 1 TABLET(50 MG) BY MOUTH AT ONSET OF HEADACHE FOR MIGRAINE. MAY REPEAT DOSE IN 2 HOURS. DO NOT EXCEED 200 MG IN 24 HOURS  Dispense: 30 tablet; Refill: 0    7. Perimenopause  Discussed definition of menopause.   Symptoms are not too bothersome.   Hoping that migraines will get better.     8. Herpes simplex virus (HSV) infection  Refilling.   - valACYclovir (VALTREX) 500 MG tablet; TAKE ONE TABLET BY MOUTH TWICE DAILY AS NEEDED FOR 3 DAYS WITH FLARES  Dispense: 24 tablet; Refill: 0    9. Need for prophylactic vaccination and inoculation against influenza    - INFLUENZA VACCINE IM > 6 MONTHS VALENT IIV4 [64155]  - Vaccine Administration, Initial [08643]      COUNSELING:  Reviewed preventive health counseling, as reflected in patient instructions       Regular exercise       Healthy diet/nutrition       Vision screening    Estimated body mass index is 28.21 kg/m  as calculated from the following:    Height as of this encounter: 1.759 m (5' 9.25\").    Weight as of this encounter: 87.3 kg (192 lb 6.4 oz).    Weight management plan: Discussed healthy diet and exercise guidelines     reports that she has never smoked. She has never used smokeless tobacco.      Counseling Resources:  ATP IV Guidelines  Pooled Cohorts Equation Calculator  Breast Cancer Risk Calculator  FRAX Risk Assessment  ICSI Preventive " Guidelines  Dietary Guidelines for Americans, 2010  USDA's MyPlate  ASA Prophylaxis  Lung CA Screening    Becca Trevizo MD, MD  [facility]

## 2019-09-28 LAB
CREAT UR-MCNC: 127 MG/DL
MICROALBUMIN UR-MCNC: 10 MG/L
MICROALBUMIN/CREAT UR: 8.19 MG/G CR (ref 0–25)

## 2019-09-28 ASSESSMENT — ANXIETY QUESTIONNAIRES: GAD7 TOTAL SCORE: 4

## 2019-09-29 ENCOUNTER — TELEPHONE (OUTPATIENT)
Dept: FAMILY MEDICINE | Facility: CLINIC | Age: 49
End: 2019-09-29

## 2019-12-05 ENCOUNTER — ANCILLARY PROCEDURE (OUTPATIENT)
Dept: MAMMOGRAPHY | Facility: CLINIC | Age: 49
End: 2019-12-05
Attending: FAMILY MEDICINE
Payer: COMMERCIAL

## 2019-12-05 DIAGNOSIS — Z12.31 SCREENING MAMMOGRAM, ENCOUNTER FOR: ICD-10-CM

## 2019-12-05 PROCEDURE — 77067 SCR MAMMO BI INCL CAD: CPT | Mod: TC

## 2020-04-30 ENCOUNTER — MYC MEDICAL ADVICE (OUTPATIENT)
Dept: FAMILY MEDICINE | Facility: CLINIC | Age: 50
End: 2020-04-30

## 2020-06-09 NOTE — TELEPHONE ENCOUNTER
Contacted pt.  Pt would like to have PHQ9 and Gad7 sent in the mail.  Questionnaire has been mailed to pt home address.  Maine Metz CMA

## 2020-06-22 ASSESSMENT — ANXIETY QUESTIONNAIRES
5. BEING SO RESTLESS THAT IT IS HARD TO SIT STILL: NOT AT ALL
IF YOU CHECKED OFF ANY PROBLEMS ON THIS QUESTIONNAIRE, HOW DIFFICULT HAVE THESE PROBLEMS MADE IT FOR YOU TO DO YOUR WORK, TAKE CARE OF THINGS AT HOME, OR GET ALONG WITH OTHER PEOPLE: NOT DIFFICULT AT ALL
3. WORRYING TOO MUCH ABOUT DIFFERENT THINGS: SEVERAL DAYS
2. NOT BEING ABLE TO STOP OR CONTROL WORRYING: NOT AT ALL
1. FEELING NERVOUS, ANXIOUS, OR ON EDGE: SEVERAL DAYS
6. BECOMING EASILY ANNOYED OR IRRITABLE: SEVERAL DAYS
GAD7 TOTAL SCORE: 3
7. FEELING AFRAID AS IF SOMETHING AWFUL MIGHT HAPPEN: NOT AT ALL

## 2020-06-22 ASSESSMENT — PATIENT HEALTH QUESTIONNAIRE - PHQ9
5. POOR APPETITE OR OVEREATING: NOT AT ALL
SUM OF ALL RESPONSES TO PHQ QUESTIONS 1-9: 3

## 2020-06-22 NOTE — TELEPHONE ENCOUNTER
PHQ-9 SCORE 4/9/2018 9/27/2019 6/22/2020   PHQ-9 Total Score - - -   PHQ-9 Total Score MyChart - - -   PHQ-9 Total Score 5 2 3       HAO-7 SCORE 4/9/2018 9/27/2019 6/22/2020   Total Score - - -   Total Score 6 4 3

## 2020-06-22 NOTE — TELEPHONE ENCOUNTER
Pt sent back PHQ9 and Gad7.  Updated chart.  See review flowsheets.  Routed to PCP for review.  Maine Metz, CMA

## 2020-06-23 ASSESSMENT — ANXIETY QUESTIONNAIRES: GAD7 TOTAL SCORE: 3

## 2020-06-27 DIAGNOSIS — G43.109 MIGRAINE WITH AURA AND WITHOUT STATUS MIGRAINOSUS, NOT INTRACTABLE: ICD-10-CM

## 2020-06-29 RX ORDER — SUMATRIPTAN 50 MG/1
TABLET, FILM COATED ORAL
Qty: 30 TABLET | Refills: 0 | Status: SHIPPED | OUTPATIENT
Start: 2020-06-29 | End: 2021-10-08

## 2020-07-04 DIAGNOSIS — F41.1 ANXIETY STATE: ICD-10-CM

## 2020-10-05 DIAGNOSIS — I10 HYPERTENSION GOAL BP (BLOOD PRESSURE) < 140/90: ICD-10-CM

## 2020-10-05 NOTE — TELEPHONE ENCOUNTER
Routing refill request to provider for review/approval because:  Patient needs to be seen because it has been more than 1 year since last office visit - failed protocol for lisinopril    Will forward to the station, please try and help the pt schedule an appt for a physical.  Thanks!

## 2020-10-05 NOTE — LETTER
October 12, 2020      Nanette Burnett  [address] [facility] [address]        Dear Ms. Nanette Burnett,    We are contacting you today to notify you that you are due for a medication follow up for further refills.     This appointment can be in clinic or virtual by either telephone, video.    Please call [phone] to schedule an appointment.     [facility]      Sincerely,     Becca Trevizo MD

## 2020-10-06 RX ORDER — LISINOPRIL 20 MG/1
TABLET ORAL
Qty: 90 TABLET | Refills: 0 | Status: SHIPPED | OUTPATIENT
Start: 2020-10-06 | End: 2020-11-12

## 2020-11-12 ENCOUNTER — VIRTUAL VISIT (OUTPATIENT)
Dept: FAMILY MEDICINE | Facility: CLINIC | Age: 50
End: 2020-11-12
Payer: COMMERCIAL

## 2020-11-12 DIAGNOSIS — F41.1 ANXIETY STATE: ICD-10-CM

## 2020-11-12 DIAGNOSIS — Z12.31 ENCOUNTER FOR SCREENING MAMMOGRAM FOR BREAST CANCER: ICD-10-CM

## 2020-11-12 DIAGNOSIS — Z13.6 CARDIOVASCULAR SCREENING; LDL GOAL LESS THAN 160: ICD-10-CM

## 2020-11-12 DIAGNOSIS — I10 HYPERTENSION GOAL BP (BLOOD PRESSURE) < 140/90: Primary | ICD-10-CM

## 2020-11-12 PROCEDURE — 96127 BRIEF EMOTIONAL/BEHAV ASSMT: CPT | Performed by: FAMILY MEDICINE

## 2020-11-12 PROCEDURE — 99214 OFFICE O/P EST MOD 30 MIN: CPT | Mod: 95 | Performed by: FAMILY MEDICINE

## 2020-11-12 RX ORDER — LISINOPRIL 20 MG/1
20 TABLET ORAL DAILY
Qty: 90 TABLET | Refills: 0 | Status: SHIPPED | OUTPATIENT
Start: 2020-11-12 | End: 2021-04-08

## 2020-11-12 ASSESSMENT — PATIENT HEALTH QUESTIONNAIRE - PHQ9
5. POOR APPETITE OR OVEREATING: NOT AT ALL
SUM OF ALL RESPONSES TO PHQ QUESTIONS 1-9: 3

## 2020-11-12 ASSESSMENT — ANXIETY QUESTIONNAIRES
GAD7 TOTAL SCORE: 2
2. NOT BEING ABLE TO STOP OR CONTROL WORRYING: NOT AT ALL
1. FEELING NERVOUS, ANXIOUS, OR ON EDGE: SEVERAL DAYS
6. BECOMING EASILY ANNOYED OR IRRITABLE: SEVERAL DAYS
3. WORRYING TOO MUCH ABOUT DIFFERENT THINGS: NOT AT ALL
7. FEELING AFRAID AS IF SOMETHING AWFUL MIGHT HAPPEN: NOT AT ALL
IF YOU CHECKED OFF ANY PROBLEMS ON THIS QUESTIONNAIRE, HOW DIFFICULT HAVE THESE PROBLEMS MADE IT FOR YOU TO DO YOUR WORK, TAKE CARE OF THINGS AT HOME, OR GET ALONG WITH OTHER PEOPLE: SOMEWHAT DIFFICULT
5. BEING SO RESTLESS THAT IT IS HARD TO SIT STILL: NOT AT ALL

## 2020-11-12 NOTE — PROGRESS NOTES
"Nanette Burnett is a 50 year old female who is being evaluated via a billable telephone visit.      The patient has been notified of following:     \"This telephone visit will be conducted via a call between you and your physician/provider. We have found that certain health care needs can be provided without the need for a physical exam.  This service lets us provide the care you need with a short phone conversation.  If a prescription is necessary we can send it directly to your pharmacy.  If lab work is needed we can place an order for that and you can then stop by our lab to have the test done at a later time.    Telephone visits are billed at different rates depending on your insurance coverage. During this emergency period, for some insurers they may be billed the same as an in-person visit.  Please reach out to your insurance provider with any questions.    If during the course of the call the physician/provider feels a telephone visit is not appropriate, you will not be charged for this service.\"    Patient has given verbal consent for Telephone visit?  {YES-NO  Default Yes:4444::\"Yes\"}    What phone number would you like to be contacted at? ***    How would you like to obtain your AVS? {AVS Preference:635673}    Subjective     Nanette Burnett is a 50 year old female who presents via phone visit today for the following health issues:    HPI     Hypertension Follow-up      Do you check your blood pressure regularly outside of the clinic? { :104722}     Are you following a low salt diet? { :287593}    Are your blood pressures ever more than 140 on the top number (systolic) OR more   than 90 on the bottom number (diastolic), for example 140/90? { :812079}      How many servings of fruits and vegetables do you eat daily?  { :539737}    On average, how many sweetened beverages do you drink each day (Examples: soda, juice, sweet tea, etc.  Do NOT count diet or artificially sweetened beverages)?   { 1-11:097220}    How " "many days per week do you exercise enough to make your heart beat faster? { :844812}    How many minutes a day do you exercise enough to make your heart beat faster? { :031258}    How many days per week do you miss taking your medication? {0-7 :796906}    {PEDS Chronic and Acute Problems (Optional):722285}     {additonal problems for provider to add (Optional):197400}    Review of Systems   {ROS COMP (Optional):524881}       Objective          Vitals:  No vitals were obtained today due to virtual visit.    {GENERAL APPEARANCE:50::\"healthy\",\"alert\",\"no distress\"}  PSYCH: Alert and oriented times 3; coherent speech, normal   rate and volume, able to articulate logical thoughts, able   to abstract reason, no tangential thoughts, no hallucinations   or delusions  Her affect is { :8974410::\"normal\"}  RESP: No cough, no audible wheezing, able to talk in full sentences  Remainder of exam unable to be completed due to telephone visits    {Diagnostic Test Results (Optional):694302}        Assessment/Plan:    {PROVIDER CHARTING PREFERENCE SOAPO:296859}    Phone call duration:  *** minutes              "

## 2020-11-12 NOTE — PROGRESS NOTES
"Nanette Burnett is a 50 year old female who is being evaluated via a billable video visit.      The patient has been notified of following:     \"This video visit will be conducted via a call between you and your physician/provider. We have found that certain health care needs can be provided without the need for an in-person physical exam.  This service lets us provide the care you need with a video conversation.  If a prescription is necessary we can send it directly to your pharmacy.  If lab work is needed we can place an order for that and you can then stop by our lab to have the test done at a later time.    Video visits are billed at different rates depending on your insurance coverage.  Please reach out to your insurance provider with any questions.    If during the course of the call the physician/provider feels a video visit is not appropriate, you will not be charged for this service.\"    Patient has given verbal consent for Video visit? Yes  How would you like to obtain your AVS? Mail a copy  If you are dropped from the video visit, the video invite should be resent to: Text to cell phone: [phone]  Will anyone else be joining your video visit? No    Subjective     Nanette Burnett is a 50 year old female who presents today via video visit for the following health issues:    HPI     Hypertension Follow-up      Do you check your blood pressure regularly outside of the clinic? No     Are you following a low salt diet? Yes    Are your blood pressures ever more than 140 on the top number (systolic) OR more   than 90 on the bottom number (diastolic), for example 140/90? Not taking    Anxiety Follow-Up    How are you doing with your anxiety since your last visit? No change    Are you having other symptoms that might be associated with anxiety? No    Have you had a significant life event? No     Are you feeling depressed? No    Do you have any concerns with your use of alcohol or other drugs? No    Social History "     Tobacco Use     Smoking status: Never Smoker     Smokeless tobacco: Never Used     Tobacco comment: occasionally in the past - social   Substance Use Topics     Alcohol use: Yes     Alcohol/week: 0.0 - 1.0 standard drinks     Drug use: No     HAO-7 SCORE 4/9/2018 9/27/2019 6/22/2020   Total Score - - -   Total Score 6 4 3     PHQ 4/9/2018 9/27/2019 6/22/2020   PHQ-9 Total Score 5 2 3   Q9: Thoughts of better off dead/self-harm past 2 weeks Not at all Not at all Not at all           How many servings of fruits and vegetables do you eat daily?  2-3    On average, how many sweetened beverages do you drink each day (Examples: soda, juice, sweet tea, etc.  Do NOT count diet or artificially sweetened beverages)?   0    How many days per week do you exercise enough to make your heart beat faster? 5    How many minutes a day do you exercise enough to make your heart beat faster? 30 - 60    How many days per week do you miss taking your medication? 1- remembering to take         Video Start Time: 9:48 AM    See under ROS    Patient Active Problem List   Diagnosis     Herpes simplex virus (HSV) infection     Anxiety state     Allergic state     External hemorrhoids     CARDIOVASCULAR SCREENING; LDL GOAL LESS THAN 160     Dysplasia of cervix     Migraine     Health Care Home     Anemia, unspecified type     Hypertension goal BP (blood pressure) < 140/90       Current Outpatient Medications   Medication Sig Dispense Refill     fexofenadine (ALLEGRA ALLERGY) 180 MG tablet Take 1 tablet (180 mg) by mouth daily as needed for allergies (allergies (Fall)) Reported on 5/22/2017       FLUoxetine (PROZAC) 20 MG capsule TAKE 1 CAPSULE(20 MG) BY MOUTH DAILY 90 capsule 1     lisinopril (ZESTRIL) 20 MG tablet TAKE 1 TABLET(20 MG) BY MOUTH DAILY 90 tablet 0     LORazepam (ATIVAN) 0.5 MG tablet Take 0.5-1 tablets (0.25-0.5 mg) by mouth every 8 hours as needed for anxiety 10 tablet 0     SUMAtriptan (IMITREX) 50 MG tablet TAKE 1 TABLET(50  MG) BY MOUTH AT ONSET OF HEADACHE FOR MIGRAINE. MAY REPEAT DOSE IN 2 HOURS. 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  Had to hire a .    Notes some anxiety around the election, but overall doing well.     Notes bp checked a couple months ago at the dentist; believes fine to high.     Objective           Vitals:  No vitals were obtained today due to virtual visit.    Physical Exam     GENERAL: Healthy, alert and no distress  EYES: Eyes grossly normal to inspection.  No discharge or erythema, or obvious scleral/conjunctival abnormalities.  RESP: No audible wheeze, cough, or visible cyanosis.  No visible retractions or increased work of breathing.    SKIN: Visible skin clear. No significant rash, abnormal pigmentation or lesions.  NEURO: Cranial nerves grossly intact.  Mentation and speech appropriate for age.  PSYCH: Mentation appears normal, affect normal/bright, judgement and insight intact, normal speech and appearance well-groomed.    PHQ-9 SCORE 9/27/2019 6/22/2020 11/12/2020   PHQ-9 Total Score - - -   PHQ-9 Total Score MyChart - - -   PHQ-9 Total Score 2 3 3       HAO-7 SCORE 9/27/2019 6/22/2020 11/12/2020   Total Score - - -   Total Score 4 3 2         GFR Estimate   Date Value Ref Range Status   09/27/2019 >90 >60 mL/min/[1.73_m2] Final     Comment:     Non  GFR Calc  Starting 12/18/2018, serum creatinine based estimated GFR (eGFR) will be   calculated using the  Chronic Kidney Disease Epidemiology Collaboration   (CKD-EPI) equation.     08/02/2018 82 >60 mL/min/1.7m2 Final     Comment:     Non  GFR Calc   04/09/2018 89 >60 mL/min/1.7m2 Final     Comment:     Non  GFR Calc         Recent Labs   Lab Test 09/10/15  0954 10/24/14  0812   CHOL 148 161   HDL 97 90   LDL 42 60   TRIG 45 53   CHOLHDLRATIO 1.5 1.8             Assessment & Plan     Hypertension goal BP (blood pressure) < 140/90  Reviewed previous values; has been awhile.  She will plan to get a bp check at a  pharmacy; probably in AV when she goes in for lab work.   - Basic metabolic panel; Future  - Albumin Random Urine Quantitative with Creat Ratio; Future  - lisinopril (ZESTRIL) 20 MG tablet; Take 1 tablet (20 mg) by mouth daily    Anxiety state  Overall stable despite stressors. Scores reflect this.  Would like to continue medication at current dose.  - FLUoxetine (PROZAC) 20 MG capsule; Take 1 capsule (20 mg) by mouth daily    Encounter for screening mammogram for breast cancer    - *MA Screening Digital Bilateral; Future    CARDIOVASCULAR SCREENING; LDL GOAL LESS THAN 160    - Lipid panel reflex to direct LDL Fasting; Future      Did get a flu shot. Week before halloween.       Return in about 4 weeks (around 12/10/2020) for Lab Work, BP Recheck, mammogram. .     If otherwise doing well; would anticipate doing surveys again in 6 months; can be without a visit.    Becca Trevizo MD, MD  [facility]          Video-Visit Details    Type of service:  Video Visit    Video End Time:9:59 AM    Originating Location (pt. Location): Home    Distant Location (provider location):  [facility]     Platform used for Video Visit: Jade

## 2020-11-13 ASSESSMENT — ANXIETY QUESTIONNAIRES: GAD7 TOTAL SCORE: 2

## 2021-01-04 ENCOUNTER — ANCILLARY PROCEDURE (OUTPATIENT)
Dept: MAMMOGRAPHY | Facility: CLINIC | Age: 51
End: 2021-01-04
Attending: FAMILY MEDICINE
Payer: COMMERCIAL

## 2021-01-04 DIAGNOSIS — Z12.31 ENCOUNTER FOR SCREENING MAMMOGRAM FOR BREAST CANCER: ICD-10-CM

## 2021-01-04 PROCEDURE — 77067 SCR MAMMO BI INCL CAD: CPT | Mod: TC | Performed by: RADIOLOGY

## 2021-01-04 PROCEDURE — 77063 BREAST TOMOSYNTHESIS BI: CPT | Mod: TC | Performed by: RADIOLOGY

## 2021-01-15 ENCOUNTER — HEALTH MAINTENANCE LETTER (OUTPATIENT)
Age: 51
End: 2021-01-15

## 2021-01-24 DIAGNOSIS — B00.9 HERPES SIMPLEX VIRUS (HSV) INFECTION: ICD-10-CM

## 2021-01-25 NOTE — TELEPHONE ENCOUNTER
Routing refill request to provider for review/approval because:  Failed protocol for valtex - due for creatinine    See last visit on 11/12/2020 - pt to have lab work and bp check.  Will forward to the station, please try to help the pt schedule lab only and nurse only bp or can go to the pharmacy.  Thanks!

## 2021-01-26 RX ORDER — VALACYCLOVIR HYDROCHLORIDE 500 MG/1
TABLET, FILM COATED ORAL
Qty: 24 TABLET | Refills: 0 | Status: SHIPPED | OUTPATIENT
Start: 2021-01-26 | End: 2021-05-19

## 2021-02-05 ENCOUNTER — ALLIED HEALTH/NURSE VISIT (OUTPATIENT)
Dept: FAMILY MEDICINE | Facility: CLINIC | Age: 51
End: 2021-02-05

## 2021-02-05 VITALS — SYSTOLIC BLOOD PRESSURE: 118 MMHG | DIASTOLIC BLOOD PRESSURE: 78 MMHG

## 2021-02-05 DIAGNOSIS — Z01.30 BP CHECK: Primary | ICD-10-CM

## 2021-02-05 DIAGNOSIS — I10 HYPERTENSION GOAL BP (BLOOD PRESSURE) < 140/90: ICD-10-CM

## 2021-02-05 DIAGNOSIS — Z13.6 CARDIOVASCULAR SCREENING; LDL GOAL LESS THAN 160: ICD-10-CM

## 2021-02-05 LAB
ANION GAP SERPL CALCULATED.3IONS-SCNC: 5 MMOL/L (ref 3–14)
BUN SERPL-MCNC: 13 MG/DL (ref 7–30)
CALCIUM SERPL-MCNC: 9 MG/DL (ref 8.5–10.1)
CHLORIDE SERPL-SCNC: 106 MMOL/L (ref 94–109)
CHOLEST SERPL-MCNC: 186 MG/DL
CO2 SERPL-SCNC: 27 MMOL/L (ref 20–32)
CREAT SERPL-MCNC: 0.65 MG/DL (ref 0.52–1.04)
CREAT UR-MCNC: 55 MG/DL
GFR SERPL CREATININE-BSD FRML MDRD: >90 ML/MIN/{1.73_M2}
GLUCOSE SERPL-MCNC: 92 MG/DL (ref 70–99)
HDLC SERPL-MCNC: 102 MG/DL
LDLC SERPL CALC-MCNC: 73 MG/DL
MICROALBUMIN UR-MCNC: <5 MG/L
MICROALBUMIN/CREAT UR: NORMAL MG/G CR (ref 0–25)
NONHDLC SERPL-MCNC: 84 MG/DL
POTASSIUM SERPL-SCNC: 4.5 MMOL/L (ref 3.4–5.3)
SODIUM SERPL-SCNC: 138 MMOL/L (ref 133–144)
TRIGL SERPL-MCNC: 54 MG/DL

## 2021-02-05 PROCEDURE — 80048 BASIC METABOLIC PNL TOTAL CA: CPT | Performed by: FAMILY MEDICINE

## 2021-02-05 PROCEDURE — 82043 UR ALBUMIN QUANTITATIVE: CPT | Performed by: FAMILY MEDICINE

## 2021-02-05 PROCEDURE — 36415 COLL VENOUS BLD VENIPUNCTURE: CPT | Performed by: FAMILY MEDICINE

## 2021-02-05 PROCEDURE — 80061 LIPID PANEL: CPT | Performed by: FAMILY MEDICINE

## 2021-02-05 PROCEDURE — 99207 PR NO CHARGE NURSE ONLY: CPT | Performed by: FAMILY MEDICINE

## 2021-02-05 NOTE — NURSING NOTE
Nanette Burnett was evaluated at [facility] on February 5, 2021 at which time her blood pressure was:    BP Readings from Last 3 Encounters:   02/05/21 118/78   09/27/19 120/76   08/02/18 116/72     Pulse Readings from Last 3 Encounters:   09/27/19 69   08/02/18 70   04/09/18 82       Reviewed lifestyle modifications for blood pressure control and reduction: including making healthy food choices, managing weight, getting regular exercise, smoking cessation, reducing alcohol consumption, monitoring blood pressure regularly.     Symptoms: None    BP Goal:< 140/90 mmHg    BP Assessment:  BP at goal    Potential Reasons for BP too high: NA - Not applicable and Unknown/Other: n/a    BP Follow-Up Plan: Recheck BP in 6 months at pharmacy    Recommendation to Provider: continue with plan      Note completed by: Thank you,  Anna Cardona, [facility]

## 2021-04-07 DIAGNOSIS — I10 HYPERTENSION GOAL BP (BLOOD PRESSURE) < 140/90: ICD-10-CM

## 2021-04-08 RX ORDER — LISINOPRIL 20 MG/1
TABLET ORAL
Qty: 90 TABLET | Refills: 2 | Status: SHIPPED | OUTPATIENT
Start: 2021-04-08 | End: 2021-12-08

## 2021-05-17 DIAGNOSIS — B00.9 HERPES SIMPLEX VIRUS (HSV) INFECTION: ICD-10-CM

## 2021-05-19 RX ORDER — VALACYCLOVIR HYDROCHLORIDE 500 MG/1
TABLET, FILM COATED ORAL
Qty: 24 TABLET | Refills: 0 | Status: SHIPPED | OUTPATIENT
Start: 2021-05-19 | End: 2021-06-25

## 2021-05-19 NOTE — TELEPHONE ENCOUNTER
Prescription approved per [facility] Refill Protocol.  Valacyclovir    Anna ROMAN RN

## 2021-06-24 DIAGNOSIS — B00.9 HERPES SIMPLEX VIRUS (HSV) INFECTION: ICD-10-CM

## 2021-06-25 RX ORDER — VALACYCLOVIR HYDROCHLORIDE 500 MG/1
TABLET, FILM COATED ORAL
Qty: 24 TABLET | Refills: 0 | Status: SHIPPED | OUTPATIENT
Start: 2021-06-25 | End: 2021-12-08

## 2021-06-25 NOTE — TELEPHONE ENCOUNTER
Prescription approved per [facility] protocol.      Lisa Gomez RN on 6/25/2021 at 8:58 AM

## 2021-07-02 DIAGNOSIS — F41.1 ANXIETY STATE: ICD-10-CM

## 2021-07-02 NOTE — TELEPHONE ENCOUNTER
Prescription approved per [facility] protocol.    Lisa Gomez RN on 7/2/2021 at 10:18 AM

## 2021-09-05 ENCOUNTER — HEALTH MAINTENANCE LETTER (OUTPATIENT)
Age: 51
End: 2021-09-05

## 2021-10-04 DIAGNOSIS — G43.109 MIGRAINE WITH AURA AND WITHOUT STATUS MIGRAINOSUS, NOT INTRACTABLE: ICD-10-CM

## 2021-10-08 RX ORDER — SUMATRIPTAN 50 MG/1
TABLET, FILM COATED ORAL
Qty: 30 TABLET | Refills: 0 | Status: SHIPPED | OUTPATIENT
Start: 2021-10-08 | End: 2021-12-08

## 2021-12-08 ENCOUNTER — OFFICE VISIT (OUTPATIENT)
Dept: FAMILY MEDICINE | Facility: CLINIC | Age: 51
End: 2021-12-08
Payer: COMMERCIAL

## 2021-12-08 VITALS
DIASTOLIC BLOOD PRESSURE: 86 MMHG | SYSTOLIC BLOOD PRESSURE: 131 MMHG | HEIGHT: 69 IN | TEMPERATURE: 98.4 F | HEART RATE: 75 BPM | BODY MASS INDEX: 27.85 KG/M2 | WEIGHT: 188 LBS | OXYGEN SATURATION: 98 %

## 2021-12-08 DIAGNOSIS — N95.2 ATROPHIC VAGINITIS: ICD-10-CM

## 2021-12-08 DIAGNOSIS — I10 HYPERTENSION GOAL BP (BLOOD PRESSURE) < 140/90: ICD-10-CM

## 2021-12-08 DIAGNOSIS — F41.1 ANXIETY STATE: ICD-10-CM

## 2021-12-08 DIAGNOSIS — Z12.31 ENCOUNTER FOR SCREENING MAMMOGRAM FOR BREAST CANCER: Primary | ICD-10-CM

## 2021-12-08 DIAGNOSIS — Z11.59 NEED FOR HEPATITIS C SCREENING TEST: ICD-10-CM

## 2021-12-08 DIAGNOSIS — Z12.11 SCREEN FOR COLON CANCER: ICD-10-CM

## 2021-12-08 DIAGNOSIS — G43.109 MIGRAINE WITH AURA AND WITHOUT STATUS MIGRAINOSUS, NOT INTRACTABLE: ICD-10-CM

## 2021-12-08 DIAGNOSIS — I87.2 VENOUS (PERIPHERAL) INSUFFICIENCY: ICD-10-CM

## 2021-12-08 DIAGNOSIS — Z00.00 ROUTINE GENERAL MEDICAL EXAMINATION AT A HEALTH CARE FACILITY: ICD-10-CM

## 2021-12-08 DIAGNOSIS — B00.9 HERPES SIMPLEX VIRUS (HSV) INFECTION: ICD-10-CM

## 2021-12-08 LAB — HCV AB SERPL QL IA: NONREACTIVE

## 2021-12-08 PROCEDURE — 80048 BASIC METABOLIC PNL TOTAL CA: CPT | Performed by: FAMILY MEDICINE

## 2021-12-08 PROCEDURE — 90682 RIV4 VACC RECOMBINANT DNA IM: CPT | Performed by: FAMILY MEDICINE

## 2021-12-08 PROCEDURE — 36415 COLL VENOUS BLD VENIPUNCTURE: CPT | Performed by: FAMILY MEDICINE

## 2021-12-08 PROCEDURE — 87624 HPV HI-RISK TYP POOLED RSLT: CPT | Performed by: FAMILY MEDICINE

## 2021-12-08 PROCEDURE — 90472 IMMUNIZATION ADMIN EACH ADD: CPT | Performed by: FAMILY MEDICINE

## 2021-12-08 PROCEDURE — G0145 SCR C/V CYTO,THINLAYER,RESCR: HCPCS | Performed by: FAMILY MEDICINE

## 2021-12-08 PROCEDURE — 86803 HEPATITIS C AB TEST: CPT | Performed by: FAMILY MEDICINE

## 2021-12-08 PROCEDURE — 82043 UR ALBUMIN QUANTITATIVE: CPT | Performed by: FAMILY MEDICINE

## 2021-12-08 PROCEDURE — 90715 TDAP VACCINE 7 YRS/> IM: CPT | Performed by: FAMILY MEDICINE

## 2021-12-08 PROCEDURE — 90471 IMMUNIZATION ADMIN: CPT | Performed by: FAMILY MEDICINE

## 2021-12-08 PROCEDURE — 99396 PREV VISIT EST AGE 40-64: CPT | Mod: 25 | Performed by: FAMILY MEDICINE

## 2021-12-08 RX ORDER — ESTRADIOL 0.1 MG/G
2 CREAM VAGINAL
Qty: 42.5 G | Refills: 4 | Status: SHIPPED | OUTPATIENT
Start: 2021-12-09 | End: 2022-10-04

## 2021-12-08 RX ORDER — VALACYCLOVIR HYDROCHLORIDE 500 MG/1
TABLET, FILM COATED ORAL
Qty: 24 TABLET | Refills: 3 | Status: SHIPPED | OUTPATIENT
Start: 2021-12-08 | End: 2022-12-28

## 2021-12-08 RX ORDER — SUMATRIPTAN 50 MG/1
TABLET, FILM COATED ORAL
Qty: 12 TABLET | Refills: 5 | Status: SHIPPED | OUTPATIENT
Start: 2021-12-08 | End: 2022-12-28

## 2021-12-08 RX ORDER — LISINOPRIL 20 MG/1
20 TABLET ORAL DAILY
Qty: 90 TABLET | Refills: 3 | Status: SHIPPED | OUTPATIENT
Start: 2021-12-08 | End: 2023-01-25

## 2021-12-08 ASSESSMENT — PATIENT HEALTH QUESTIONNAIRE - PHQ9: 5. POOR APPETITE OR OVEREATING: NOT AT ALL

## 2021-12-08 ASSESSMENT — ANXIETY QUESTIONNAIRES
GAD7 TOTAL SCORE: 3
7. FEELING AFRAID AS IF SOMETHING AWFUL MIGHT HAPPEN: NOT AT ALL
1. FEELING NERVOUS, ANXIOUS, OR ON EDGE: SEVERAL DAYS
5. BEING SO RESTLESS THAT IT IS HARD TO SIT STILL: NOT AT ALL
IF YOU CHECKED OFF ANY PROBLEMS ON THIS QUESTIONNAIRE, HOW DIFFICULT HAVE THESE PROBLEMS MADE IT FOR YOU TO DO YOUR WORK, TAKE CARE OF THINGS AT HOME, OR GET ALONG WITH OTHER PEOPLE: NOT DIFFICULT AT ALL
3. WORRYING TOO MUCH ABOUT DIFFERENT THINGS: SEVERAL DAYS
6. BECOMING EASILY ANNOYED OR IRRITABLE: SEVERAL DAYS
2. NOT BEING ABLE TO STOP OR CONTROL WORRYING: NOT AT ALL

## 2021-12-08 ASSESSMENT — ENCOUNTER SYMPTOMS
CONSTIPATION: 0
FREQUENCY: 0
DIARRHEA: 0
BREAST MASS: 0
CHILLS: 0
WEAKNESS: 0
HEMATURIA: 0
PALPITATIONS: 0
FEVER: 0
JOINT SWELLING: 0
HEADACHES: 1
MYALGIAS: 0
PARESTHESIAS: 0
HEARTBURN: 0
EYE PAIN: 0
COUGH: 0
DYSURIA: 0
ARTHRALGIAS: 0
DIZZINESS: 0
SHORTNESS OF BREATH: 0
SORE THROAT: 0
HEMATOCHEZIA: 0
NAUSEA: 0
NERVOUS/ANXIOUS: 1
ABDOMINAL PAIN: 0

## 2021-12-08 ASSESSMENT — MIFFLIN-ST. JEOR: SCORE: 1537.14

## 2021-12-08 NOTE — PROGRESS NOTES
SUBJECTIVE:   CC: Nanette Burnett is an 51 year old woman who presents for preventive health visit.     She is doing well and has no concerns.   She does need refills of her meds.   She is taking and not having side effects.   She is having trouble with ongoing venous insufficiency and wonders what can be done      Patient has been advised of split billing requirements and indicates understanding: Yes  Healthy Habits:     Getting at least 3 servings of Calcium per day:  NO    Bi-annual eye exam:  Yes    Dental care twice a year:  Yes    Sleep apnea or symptoms of sleep apnea:  Daytime drowsiness    Diet:  Regular (no restrictions)    Frequency of exercise:  4-5 days/week    Duration of exercise:  45-60 minutes    Taking medications regularly:  Yes    Medication side effects:  None    PHQ-2 Total Score: 0    Additional concerns today:  No    Past Medical History:   Diagnosis Date     Allergy, unspecified not elsewhere classified     Hayfever, better in the early      Anxiety state, unspecified      Dysplastic nevus     near toe. another suspicious mole right thigh. sees Derm - both removed     Genital herpes, unspecified     about 2 - 4 flares per year     Insomnia, unspecified      Migraine, unspecified, without mention of intractable migraine without mention of status migrainosus     will have some with aura; has others       Past Surgical History:   Procedure Laterality Date     ARTHROSCOPY KNEE Right 2016    lateral and medial meniscal tear     C/SECTION, LOW TRANSVERSE  3/05    , Low Transverse     C/SECTION, LOW TRANSVERSE       HERNIORRHAPHY INGUINAL  2012    Procedure: HERNIORRHAPHY INGUINAL;;  Surgeon: Tray Valenzuela MD;  Location: [facility] OR     LAPAROSCOPIC TUBAL LIGATION  2012    Procedure: LAPAROSCOPIC TUBAL LIGATION;  LAPAROSCOPIC TUBAL LIGATION, Right Inguinal Hernia Repair with Mesh ;  Surgeon: Gerson Burrell MD;  Location:  OR     [facility] NONSPECIFIC PROCEDURE   age 6    s/p tonsillectomy & adenoidectomy     [facility] NONSPECIFIC PROCEDURE  6/94    L knee surgery ACL-arthroscopic/open     [facility] NONSPECIFIC PROCEDURE  8/95    vein stripping x 2     [facility] NONSPECIFIC PROCEDURE  8/97    R ACL surgery     [facility] NONSPECIFIC PROCEDURE  1995    s/p L inguinal hernia repair     [facility] NONSPECIFIC PROCEDURE  8/98    abnormal pap-TCA     [facility] NONSPECIFIC PROCEDURE  7/02    bunion surgery L     [facility] NONSPECIFIC PROCEDURE  7/01    Bilateral saphenous vein       MEDICATIONS:  Current Outpatient Medications   Medication     [START ON 12/9/2021] estradiol (ESTRACE) 0.1 MG/GM vaginal cream     FLUoxetine (PROZAC) 20 MG capsule     lisinopril (ZESTRIL) 20 MG tablet     SUMAtriptan (IMITREX) 50 MG tablet     valACYclovir (VALTREX) 500 MG tablet     fexofenadine (ALLEGRA ALLERGY) 180 MG tablet     LORazepam (ATIVAN) 0.5 MG tablet     triamcinolone (KENALOG) 0.1 % cream     No current facility-administered medications for this visit.       SOCIAL HISTORY:  Social History     Tobacco Use     Smoking status: Never Smoker     Smokeless tobacco: Never Used     Tobacco comment: occasionally in the past - social   Substance Use Topics     Alcohol use: Yes     Alcohol/week: 0.0 - 1.0 standard drinks       Family History   Problem Relation Age of Onset     Cancer Other         Prostate CA     Hypertension Father      Neurologic Disorder Father         ALS     Thyroid Disease Mother         actually hypoglycemia; not thyroid.     Hypertension Maternal Grandmother      Cerebrovascular Disease Maternal Grandmother      Cancer Maternal Grandmother         ovarian/uterine dx at age 50's     Breast Cancer Paternal Grandmother         dx at age 60's         Today's PHQ-2 Score:   PHQ-2 ( 1999 [employer]) 12/8/2021   Q1: Little interest or pleasure in doing things 0   Q2: Feeling down, depressed or hopeless 0   PHQ-2 Score 0   PHQ-2 Total Score (12-17 Years)- Positive if 3 or more points; Administer PHQ-A if positive -   Q1:  Little interest or pleasure in doing things Not at all   Q2: Feeling down, depressed or hopeless Not at all   PHQ-2 Score 0       Abuse: Current or Past (Physical, Sexual or Emotional) - No  Do you feel safe in your environment? Yes    Have you ever done Advance Care Planning? (For example, a Health Directive, POLST, or a discussion with a medical provider or your loved ones about your wishes): No, advance care planning information given to patient to review.  Patient declined advance care planning discussion at this time.    Social History     Tobacco Use     Smoking status: Never Smoker     Smokeless tobacco: Never Used     Tobacco comment: occasionally in the past - social   Substance Use Topics     Alcohol use: Yes     Alcohol/week: 0.0 - 1.0 standard drinks     If you drink alcohol do you typically have >3 drinks per day or >7 drinks per week? No    Alcohol Use 12/8/2021   Prescreen: >3 drinks/day or >7 drinks/week? No   Prescreen: >3 drinks/day or >7 drinks/week? -       Reviewed orders with patient.  Reviewed health maintenance and updated orders accordingly - Yes  Labs reviewed in EPIC    Breast Cancer Screening:    FHS-7:   Breast CA Risk Assessment (FHS-7) 12/8/2021   Did any of your first-degree relatives have breast or ovarian cancer? No   Did any of your relatives have bilateral breast cancer? Unknown   Did any man in your family have breast cancer? No   Did any woman in your family have breast and ovarian cancer? No   Did any woman in your family have breast cancer before age 50 y? No   Do you have 2 or more relatives with breast and/or ovarian cancer? No   Do you have 2 or more relatives with breast and/or bowel cancer? No       Mammogram Screening: Recommended annual mammography  Pertinent mammograms are reviewed under the imaging tab.    History of abnormal Pap smear: NO - age 30-65 PAP every 5 years with negative HPV co-testing recommended  PAP / HPV Latest Ref Rng & Units 8/2/2018 10/7/2016  9/10/2015   PAP (Historical) - NIL NIL NIL   HPV16 NEG:Negative Negative Negative -   HPV18 NEG:Negative Negative Negative -   HRHPV NEG:Negative Negative Negative -       Past Medical History:   Diagnosis Date     Allergy, unspecified not elsewhere classified     Hayfever, better in the early      Anxiety state, unspecified      Dysplastic nevus     near toe. another suspicious mole right thigh. sees Derm     Genital herpes, unspecified     about 2 flares per year     Insomnia, unspecified      Migraine, unspecified, without mention of intractable migraine without mention of status migrainosus     will have some with aura; has others       Past Surgical History:   Procedure Laterality Date     ARTHROSCOPY KNEE Right 2016    lateral and medial meniscal tear     C/SECTION, LOW TRANSVERSE  3/05    , Low Transverse     C/SECTION, LOW TRANSVERSE       HERNIORRHAPHY INGUINAL  2012    Procedure: HERNIORRHAPHY INGUINAL;;  Surgeon: Tray Valenzuela MD;  Location: [facility] OR     LAPAROSCOPIC TUBAL LIGATION  2012    Procedure: LAPAROSCOPIC TUBAL LIGATION;  LAPAROSCOPIC TUBAL LIGATION, Right Inguinal Hernia Repair with Mesh ;  Surgeon: Gerson Burrell MD;  Location: [facility] OR     [facility] NONSPECIFIC PROCEDURE  age 6    s/p tonsillectomy & adenoidectomy     [facility] NONSPECIFIC PROCEDURE      L knee surgery ACL-arthroscopic/open     [facility] NONSPECIFIC PROCEDURE      vein stripping x 2     [facility] NONSPECIFIC PROCEDURE      R ACL surgery     [facility] NONSPECIFIC PROCEDURE      s/p L inguinal hernia repair     [facility] NONSPECIFIC PROCEDURE      abnormal pap-TCA     [facility] NONSPECIFIC PROCEDURE      bunion surgery L     [facility] NONSPECIFIC PROCEDURE      Bilateral saphenous vein       MEDICATIONS:  Current Outpatient Medications   Medication     FLUoxetine (PROZAC) 20 MG capsule     lisinopril (ZESTRIL) 20 MG tablet     SUMAtriptan (IMITREX) 50 MG tablet     valACYclovir (VALTREX) 500 MG tablet      "fexofenadine (ALLEGRA ALLERGY) 180 MG tablet     LORazepam (ATIVAN) 0.5 MG tablet     triamcinolone (KENALOG) 0.1 % cream     No current facility-administered medications for this visit.       SOCIAL HISTORY:  Social History     Tobacco Use     Smoking status: Never Smoker     Smokeless tobacco: Never Used     Tobacco comment: occasionally in the past - social   Substance Use Topics     Alcohol use: Yes     Alcohol/week: 0.0 - 1.0 standard drinks       Family History   Problem Relation Age of Onset     Cancer Other         Prostate CA     Hypertension Father      Neurologic Disorder Father         ALS     Thyroid Disease Mother         actually hypoglycemia; not thyroid.     Hypertension Maternal Grandmother      Cerebrovascular Disease Maternal Grandmother      Cancer Maternal Grandmother         ovarian/uterine dx at age 50's     Breast Cancer Paternal Grandmother         dx at age 60's         Review of Systems   Constitutional: Negative for chills and fever.   HENT: Negative for congestion, ear pain, hearing loss and sore throat.    Eyes: Positive for visual disturbance. Negative for pain.   Respiratory: Negative for cough and shortness of breath.    Cardiovascular: Positive for peripheral edema. Negative for chest pain and palpitations.   Gastrointestinal: Negative for abdominal pain, constipation, diarrhea, heartburn, hematochezia and nausea.   Breasts:  Negative for tenderness, breast mass and discharge.   Genitourinary: Negative for dysuria, frequency, genital sores, hematuria, pelvic pain, urgency, vaginal bleeding and vaginal discharge.   Musculoskeletal: Negative for arthralgias, joint swelling and myalgias.   Skin: Negative for rash.   Neurological: Positive for headaches. Negative for dizziness, weakness and paresthesias.   Psychiatric/Behavioral: Positive for mood changes. The patient is nervous/anxious.           OBJECTIVE:   /86   Pulse 75   Temp 98.4  F (36.9  C) (Oral)   Ht 1.753 m (5' 9\") "   Wt 85.3 kg (188 lb)   LMP 10/01/2019   SpO2 98%   BMI 27.76 kg/m    Physical Exam  GENERAL APPEARANCE: healthy, alert and no distress  EYES: Eyes grossly normal to inspection, PERRL and conjunctivae and sclerae normal  HENT: ear canals and TM's normal, nose and mouth without ulcers or lesions, oropharynx clear and oral mucous membranes moist  NECK: no adenopathy, no asymmetry, masses, or scars and thyroid normal to palpation  RESP: lungs clear to auscultation - no rales, rhonchi or wheezes  BREAST: normal without masses, tenderness or nipple discharge and no palpable axillary masses or adenopathy  CV: regular rate and rhythm, normal S1 S2, no S3 or S4, no murmur, click or rub, no peripheral edema and peripheral pulses strong  ABDOMEN: soft, nontender, no hepatosplenomegaly, no masses and bowel sounds normal  MS: no musculoskeletal defects are noted and gait is age appropriate without ataxia  SKIN: no suspicious lesions or rashes  NEURO: Normal strength and tone, sensory exam grossly normal, mentation intact and speech normal  PSYCH: mentation appears normal and affect normal/bright    Diagnostic Test Results:  Labs reviewed in Epic    ASSESSMENT/PLAN:   (Z12.31) Encounter for screening mammogram for breast cancer  (primary encounter diagnosis)  Comment:   Plan: MA SCREENING DIGITAL BILAT - Future  (s+30)            (Z00.00) Routine general medical examination at a health care facility  Comment:   Plan: REVIEW OF HEALTH MAINTENANCE PROTOCOL ORDERS,         Pap Screen with HPV - recommended age 30 - 65         years            (Z12.11) Screen for colon cancer  Comment:   Plan: Adult Gastro Ref - Procedure Only            (Z11.59) Need for hepatitis C screening test  Comment:   Plan: Hepatitis C Screen Reflex to HCV RNA Quant and         Genotype            (F41.1) Anxiety state  Comment: stable  Plan: FLUoxetine (PROZAC) 20 MG capsule        Refills per epicare   Continue current dose    (I10) Hypertension goal  "BP (blood pressure) < 140/90  Comment: under good control   Plan: lisinopril (ZESTRIL) 20 MG tablet, Albumin         Random Urine Quantitative with Creat Ratio,         Basic metabolic panel  (Ca, Cl, CO2, Creat,         Gluc, K, Na, BUN)        Refills per epicare  Continue current dose    (G43.109) Migraine with aura and without status migrainosus, not intractable  Comment: stable  Plan: SUMAtriptan (IMITREX) 50 MG tablet        Refills per epicare     (B00.9) Herpes simplex virus (HSV) infection  Comment: stable  Plan: valACYclovir (VALTREX) 500 MG tablet        Refills per epicare     (N95.2) Atrophic vaginitis  Comment: new with some burning and dryness and discomfort with intercourse  Plan: estradiol (ESTRACE) 0.1 MG/GM vaginal cream        Will try low dose 2 times per week  The potential side effects of the prescription medication were discussed with the patient.       Patient has been advised of split billing requirements and indicates understanding: Yes  COUNSELING:  Reviewed preventive health counseling, as reflected in patient instructions  Special attention given to:        Immunizations    Vaccinated for: TDAP and Zoster             Colon cancer screening       Consider Hep C screening for all patients one time for ages 18-79 years    Estimated body mass index is 27.76 kg/m  as calculated from the following:    Height as of this encounter: 1.753 m (5' 9\").    Weight as of this encounter: 85.3 kg (188 lb).        She reports that she has never smoked. She has never used smokeless tobacco.      Counseling Resources:  ATP IV Guidelines  Pooled Cohorts Equation Calculator  Breast Cancer Risk Calculator  BRCA-Related Cancer Risk Assessment: FHS-7 Tool  FRAX Risk Assessment  ICSI Preventive Guidelines  Dietary Guidelines for Americans, 2010  USDA's MyPlate  ASA Prophylaxis  Lung CA Screening    Claudia Erickson MD  [facility]"

## 2021-12-09 LAB
ANION GAP SERPL CALCULATED.3IONS-SCNC: 2 MMOL/L (ref 3–14)
BUN SERPL-MCNC: 17 MG/DL (ref 7–30)
CALCIUM SERPL-MCNC: 9 MG/DL (ref 8.5–10.1)
CHLORIDE BLD-SCNC: 108 MMOL/L (ref 94–109)
CO2 SERPL-SCNC: 31 MMOL/L (ref 20–32)
CREAT SERPL-MCNC: 0.67 MG/DL (ref 0.52–1.04)
GFR SERPL CREATININE-BSD FRML MDRD: >90 ML/MIN/1.73M2
GLUCOSE BLD-MCNC: 86 MG/DL (ref 70–99)
POTASSIUM BLD-SCNC: 5.3 MMOL/L (ref 3.4–5.3)
SODIUM SERPL-SCNC: 141 MMOL/L (ref 133–144)

## 2021-12-09 ASSESSMENT — PATIENT HEALTH QUESTIONNAIRE - PHQ9: SUM OF ALL RESPONSES TO PHQ QUESTIONS 1-9: 5

## 2021-12-09 ASSESSMENT — ANXIETY QUESTIONNAIRES: GAD7 TOTAL SCORE: 3

## 2021-12-10 LAB
BKR LAB AP GYN ADEQUACY: NORMAL
BKR LAB AP GYN INTERPRETATION: NORMAL
BKR LAB AP HPV REFLEX: NORMAL
BKR LAB AP PREVIOUS ABNORMAL: NORMAL
PATH REPORT.COMMENTS IMP SPEC: NORMAL
PATH REPORT.COMMENTS IMP SPEC: NORMAL
PATH REPORT.RELEVANT HX SPEC: NORMAL

## 2021-12-11 LAB
CREAT UR-MCNC: 145 MG/DL
MICROALBUMIN UR-MCNC: 13 MG/L
MICROALBUMIN/CREAT UR: 8.97 MG/G CR (ref 0–25)

## 2021-12-14 LAB
HUMAN PAPILLOMA VIRUS 16 DNA: NEGATIVE
HUMAN PAPILLOMA VIRUS 18 DNA: NEGATIVE
HUMAN PAPILLOMA VIRUS FINAL DIAGNOSIS: NORMAL
HUMAN PAPILLOMA VIRUS OTHER HR: NEGATIVE

## 2021-12-23 ENCOUNTER — MYC MEDICAL ADVICE (OUTPATIENT)
Dept: FAMILY MEDICINE | Facility: CLINIC | Age: 51
End: 2021-12-23
Payer: COMMERCIAL

## 2021-12-23 ENCOUNTER — E-VISIT (OUTPATIENT)
Dept: FAMILY MEDICINE | Facility: CLINIC | Age: 51
End: 2021-12-23
Payer: COMMERCIAL

## 2021-12-23 DIAGNOSIS — F41.1 ANXIETY STATE: ICD-10-CM

## 2021-12-23 PROCEDURE — 99421 OL DIG E/M SVC 5-10 MIN: CPT | Performed by: FAMILY MEDICINE

## 2021-12-23 ASSESSMENT — ANXIETY QUESTIONNAIRES
5. BEING SO RESTLESS THAT IT IS HARD TO SIT STILL: NOT AT ALL
7. FEELING AFRAID AS IF SOMETHING AWFUL MIGHT HAPPEN: NOT AT ALL
2. NOT BEING ABLE TO STOP OR CONTROL WORRYING: SEVERAL DAYS
8. IF YOU CHECKED OFF ANY PROBLEMS, HOW DIFFICULT HAVE THESE MADE IT FOR YOU TO DO YOUR WORK, TAKE CARE OF THINGS AT HOME, OR GET ALONG WITH OTHER PEOPLE?: SOMEWHAT DIFFICULT
4. TROUBLE RELAXING: SEVERAL DAYS
1. FEELING NERVOUS, ANXIOUS, OR ON EDGE: MORE THAN HALF THE DAYS
6. BECOMING EASILY ANNOYED OR IRRITABLE: SEVERAL DAYS
GAD7 TOTAL SCORE: 6
3. WORRYING TOO MUCH ABOUT DIFFERENT THINGS: SEVERAL DAYS
GAD7 TOTAL SCORE: 6
GAD7 TOTAL SCORE: 6
7. FEELING AFRAID AS IF SOMETHING AWFUL MIGHT HAPPEN: NOT AT ALL

## 2021-12-24 ASSESSMENT — ANXIETY QUESTIONNAIRES: GAD7 TOTAL SCORE: 6

## 2021-12-27 RX ORDER — HYDROXYZINE HYDROCHLORIDE 25 MG/1
25 TABLET, FILM COATED ORAL
Qty: 30 TABLET | Refills: 1 | Status: SHIPPED | OUTPATIENT
Start: 2021-12-27 | End: 2022-02-22

## 2021-12-27 NOTE — PATIENT INSTRUCTIONS
"    Warning Signs of Suicide and What You Can Do  If you think a person could be suicidal, ask, \"Have you thought about suicide?\" Asking won't make it more likely that they will try to hurt themselves. In fact, most people with suicidal thoughts say they are relieved when the question is asked.   If they say yes, they may already have a plan for how and when they will attempt it. Find out as much as you can. The more detailed the plan, and the easier it is to carry out, the more danger the person is in right now.     Know the warning signs  The warning signs for suicide include:    Threats or talk of suicide    Talking about death and dying    Changing eating or sleeping habits (for instance, not sleeping or sleeping all of the time)    Feeling hopeless    Suddenly buying a gun or other weapon    Saying things such as \"Soon, I won't be a problem\" or \"Nothing matters\"    Giving away things they own, making out a will, or planning their     Suddenly being happy or calm after being depressed  Things that put a person at a higher risk of attempting suicide include:     A history of suicide in the person's family    Past suicide attempts    Alcohol and drug use, along with impulsive behaviors    Having a diagnosed mood disorder such as depression or bipolar disorder    History of trauma or abuse including bullying    Major losses such as a divorce, death of a loved one, money problems, or legal problems    Having access to a lethal weapon (such as guns in the home)    Long-term (chronic) physical illnesses, including chronic pain    Being around others with suicidal behavior  Get help  Don't try to handle this alone. You can be the most help by getting the person to a trained professional. Suicidal thinking may be a sign of depression. This is a serious but treatable illness.   In an emergency--call 911  Don't leave the person alone. Anyone who is at imminent risk of suicide needs psychiatric services right away. " The person must be constantly watched, and never left out of sight. Call 911 or a 24-hour suicide crisis hotline. You can search for this online. You can also take the person to the nearest hospital emergency room.   Don't keep it a secret and don't wait  Call a mental health clinic or a licensed mental health professional in your area right away. This may be a psychiatrist, clinical psychologist, psychiatric or licensed clinical , marriage and family counselor, or clergy. If you don't know how to contact such professionals and there is an immediate risk, call 911. Tell them you need help for a person who is thinking about suicide.   Resources    National Suicide Prevention Qsbbsgmm674-208-0573 (985-154-EUDQ)www.suicidepreventionlifeline.org    National Suicide Bacgteq554-587-2350 (800-SUICIDE)    National [place] of Mental Abjeai648-333-7533udr.[facility].nih.gov    National [place] on Mental Sqcyzpb470-168-5214ofs.jas.org    Mental Health Mclpoun659-896-3854xzq.[facility].org    Kofi last reviewed this educational content on 1/1/2020 2000-2021 [employer]. All rights reserved. This information is not intended as a substitute for professional medical care. Always follow your healthcare professional's instructions.          Using Antidepressants  Depression is a mood disorder that affects the way you think and feel. The most common symptom is a feeling of deep sadness. This feeling doesn't go away or get better on its own. But most types of depression can be helped with therapy and antidepressant medicines. (Note: This covers antidepressant use in adults only.)     What do antidepressants do?  Antidepressants restore the balance of certain chemicals in your brain to help ease your depression. You will likely feel better in 4 to 6 weeks. But you may continue taking antidepressants for a year or more to keep your symptoms from coming back. Some people with depression need to take  antidepressants for life. There are several types of antidepressants. The main types are described below.   Selective serotonin reuptake inhibitors (SSRIs)  SSRIs are effective medicines for the treatment of depression. They tend to have fewer side effects than other antidepressants. Possible side effects include anxiety, trouble sleeping, nausea, diarrhea, sexual dysfunction, and headaches. In rare cases, they may make you more depressed. SSRIs shouldn t be mixed with certain other medicines. Talk with your healthcare provider about all the medicines, herbs, and supplements you are taking.   Tricyclic antidepressants  Tricyclics help severe or long-term depression. They have been used for many years with good results. Possible side effects include blurred vision, dry mouth, and constipation.   Monoamine oxidase inhibitors (MAOIs)  If you don t respond to tricyclics or SSRIs, your healthcare provider may prescribe MAOIs. These medicines can be very effective. But people taking MAOIs must stay away from certain foods and medicines. Your healthcare provider can tell you more.   Lithium  If you have bipolar disorder, you may take a medicine called lithium. This medicine helps even out your mood. Possible side effects are weight gain, trembling, loose stool, and nausea. Lithium is also used:     For people who have unipolar depression and have not responded to other antidepressants    For people who have a sudden (acute) episode of unipolar depression    As a maintenance medicine to prevent unipolar depression from happening again  Things to stay away from if you are taking MAOIs   If you are taking MAOIs, don t have any of the following:     Beans    Aged cheese    Chocolate    Red wine    Most cold medicines    Certain medicines (ask your healthcare provider)  To reduce the risk of lithium poisoning  You can reduce the risk of lithium poisoning by following this advice:    Take only the prescribed amount of lithium.  If your depressive symptoms get worse, contact your healthcare provider. Never increase or decrease your medicine on your own.    Drink plenty of fluids other than coffee, tea, and soda.    Limit salt in your diet.    Before using other prescribed medicines or over-the-counter medicines, check with your pharmacist. This is to be sure the medicines won t interact with the lithium.    Never share your medicines or use another person's medicines, even if it is the same medicine and dose.    Keep follow-up appointments.    Have your lithium blood level checked as advised. You will need blood work more often when symptoms are not under control.  If you have side effects  The side effects of antidepressants are usually mild. But if you have troubling side effects, call your healthcare provider. Changing the dosage or type of medicine may help. Never stop taking medicines on your own.   Kofi last reviewed this educational content on 9/1/2019 2000-2021 [employer]. All rights reserved. This information is not intended as a substitute for professional medical care. Always follow your healthcare professional's instructions.

## 2021-12-31 ENCOUNTER — MYC MEDICAL ADVICE (OUTPATIENT)
Dept: FAMILY MEDICINE | Facility: CLINIC | Age: 51
End: 2021-12-31
Payer: COMMERCIAL

## 2022-01-03 NOTE — PATIENT INSTRUCTIONS
Patient Education     Treating Hemorrhoids: Self-Care  Follow your healthcare provider s advice about caring for your hemorrhoids at home. Some treatments help relieve symptoms right away. Others involve making changes in your diet and exercise habits. These can help ease constipation and prevent hemorrhoids from coming back.   Relieving symptoms  Your healthcare provider may prescribe anti-inflammatory medicine to help ease your symptoms. These tips will also help relieve pain and swelling.     Take sitz baths. Taking a sitz bath means sitting in a few inches of warm bath water. Soaking for 10 minutes twice a day can provide relief from painful hemorrhoids. It can also help the area stay clean.    Develop good bowel habits. Use the bathroom when you need to. Don t ignore the urge to move your bowels. This can lead to constipation, hard stools, and straining. Also, don t read while on the toilet. Sit only as long as needed. Wipe gently with soft, unscented toilet tissue or baby wipes.    Use ice packs. Placing an ice pack on an external hemorrhoid can help relieve pain right away. It will also help reduce the blood clot. Use the ice for 15 to 20 minutes at a time. Keep a cloth between the ice and your skin to prevent skin damage.    Use other measures. Laxatives and enemas can help ease constipation. But use them only on your healthcare provider s advice. For symptom relief, try using cotton pads soaked in witch hazel. These are available at most drugstores. Over-the-counter hemorrhoid ointments and petroleum jelly can also provide relief.  Add fiber to your diet    Adding fiber to your diet can help relieve constipation by making stools softer and easier to pass. To increase your fiber intake, your healthcare provider may recommend a bulking agent, such as psyllium. This is a high-fiber supplement available at most grocery stores and drugstores. Eating more fiber-rich foods will also help. There are two types of  fiber:     Insoluble fiber is the main ingredient in bulking agents. It s also found in foods such as wheat bran, whole-grain breads, fresh fruits, and vegetables.    Soluble fiber is found in foods such as oat bran. Although soluble fiber is good for you, it may not ease constipation as much as foods high in insoluble fiber.  Drink more water  Along with a high-fiber diet, drinking more water can help ease constipation. This is because insoluble fiber absorbs water, making stools soft and bulky. Be sure to drink plenty of water throughout the day. Drinking fruit juices, such as prune juice or apple juice, can also help prevent constipation.   Get more exercise  Regular exercise aids digestion and helps prevent constipation. It s also great for your health. So talk with your healthcare provider about starting an exercise program. Low-impact activities, such as swimming or walking, are good places to start. Take it easy at first. And remember to drink plenty of water when you exercise.   High-fiber foods Easy ways to add fiber  High-fiber foods offer many benefits. By making your stools softer, they help heal and prevent swollen hemorrhoids. They may also help reduce the risk of colon and rectal cancer. Best of all, they re usually low in calories and taste great. Here are some examples of fiber-rich foods.     Whole grains, such as wheat bran, corn bran, and brown rice    Vegetables, especially carrots, broccoli, cabbage, and peas    Fruits, such as apples, bananas, raisins, peaches, prunes, and pears    Nuts and legumes, especially peanuts, lentils, and kidney beans  Easy ways to add fiber  The tips below offer some simple ways to add more high-fiber foods to your meals.     Start your day with a high-fiber breakfast. Eat a wheat bran cereal along with a sliced banana. Or, try peanut butter on whole-wheat toast.    Eat carrot sticks for snacks. They re easy to prepare, taste great, and are low in calories.    Use  whole-grain breads instead of white bread for sandwiches.    Eat fruits for treats. Try an apple and some raisins instead of a candy bar.  Amorelie last reviewed this educational content on 6/1/2019 2000-2021 [employer]. All rights reserved. This information is not intended as a substitute for professional medical care. Always follow your healthcare professional's instructions.

## 2022-01-21 ENCOUNTER — ANCILLARY PROCEDURE (OUTPATIENT)
Dept: MAMMOGRAPHY | Facility: CLINIC | Age: 52
End: 2022-01-21
Payer: COMMERCIAL

## 2022-01-21 DIAGNOSIS — Z12.31 ENCOUNTER FOR SCREENING MAMMOGRAM FOR BREAST CANCER: ICD-10-CM

## 2022-01-21 PROCEDURE — 77067 SCR MAMMO BI INCL CAD: CPT | Mod: TC | Performed by: RADIOLOGY

## 2022-01-21 PROCEDURE — 77063 BREAST TOMOSYNTHESIS BI: CPT | Mod: TC | Performed by: RADIOLOGY

## 2022-01-25 ENCOUNTER — TRANSFERRED RECORDS (OUTPATIENT)
Dept: HEALTH INFORMATION MANAGEMENT | Facility: CLINIC | Age: 52
End: 2022-01-25
Payer: COMMERCIAL

## 2022-02-07 ENCOUNTER — TRANSFERRED RECORDS (OUTPATIENT)
Dept: HEALTH INFORMATION MANAGEMENT | Facility: CLINIC | Age: 52
End: 2022-02-07
Payer: COMMERCIAL

## 2022-02-22 DIAGNOSIS — F41.1 ANXIETY STATE: ICD-10-CM

## 2022-02-22 RX ORDER — HYDROXYZINE HYDROCHLORIDE 25 MG/1
TABLET, FILM COATED ORAL
Qty: 30 TABLET | Refills: 1 | Status: SHIPPED | OUTPATIENT
Start: 2022-02-22 | End: 2022-04-14

## 2022-02-22 NOTE — TELEPHONE ENCOUNTER
Prescription approved per [facility] Refill Protocol.    .Wendy MADDEN RN, BSN

## 2022-04-12 DIAGNOSIS — F41.1 ANXIETY STATE: ICD-10-CM

## 2022-04-14 RX ORDER — HYDROXYZINE HYDROCHLORIDE 25 MG/1
TABLET, FILM COATED ORAL
Qty: 30 TABLET | Refills: 1 | Status: SHIPPED | OUTPATIENT
Start: 2022-04-14 | End: 2022-06-09

## 2022-04-14 NOTE — TELEPHONE ENCOUNTER
Prescription approved per [facility] Refill Protocol.    Carly Daniel RN

## 2022-04-21 ENCOUNTER — TRANSFERRED RECORDS (OUTPATIENT)
Dept: HEALTH INFORMATION MANAGEMENT | Facility: CLINIC | Age: 52
End: 2022-04-21
Payer: COMMERCIAL

## 2022-05-03 ENCOUNTER — TRANSFERRED RECORDS (OUTPATIENT)
Dept: HEALTH INFORMATION MANAGEMENT | Facility: CLINIC | Age: 52
End: 2022-05-03
Payer: COMMERCIAL

## 2022-05-12 ENCOUNTER — OFFICE VISIT (OUTPATIENT)
Dept: FAMILY MEDICINE | Facility: CLINIC | Age: 52
End: 2022-05-12
Payer: COMMERCIAL

## 2022-05-12 VITALS
OXYGEN SATURATION: 98 % | RESPIRATION RATE: 14 BRPM | SYSTOLIC BLOOD PRESSURE: 120 MMHG | DIASTOLIC BLOOD PRESSURE: 88 MMHG | TEMPERATURE: 98.2 F | HEIGHT: 69 IN | BODY MASS INDEX: 28.14 KG/M2 | HEART RATE: 78 BPM | WEIGHT: 190 LBS

## 2022-05-12 DIAGNOSIS — I10 HYPERTENSION GOAL BP (BLOOD PRESSURE) < 140/90: ICD-10-CM

## 2022-05-12 DIAGNOSIS — Z01.818 PREOP GENERAL PHYSICAL EXAM: Primary | ICD-10-CM

## 2022-05-12 DIAGNOSIS — G57.62 MORTON'S NEUROMA OF LEFT FOOT: ICD-10-CM

## 2022-05-12 LAB
ANION GAP SERPL CALCULATED.3IONS-SCNC: 2 MMOL/L (ref 3–14)
BUN SERPL-MCNC: 11 MG/DL (ref 7–30)
CALCIUM SERPL-MCNC: 9.1 MG/DL (ref 8.5–10.1)
CHLORIDE BLD-SCNC: 108 MMOL/L (ref 94–109)
CO2 SERPL-SCNC: 31 MMOL/L (ref 20–32)
CREAT SERPL-MCNC: 0.7 MG/DL (ref 0.52–1.04)
GFR SERPL CREATININE-BSD FRML MDRD: >90 ML/MIN/1.73M2
GLUCOSE BLD-MCNC: 83 MG/DL (ref 70–99)
POTASSIUM BLD-SCNC: 5.1 MMOL/L (ref 3.4–5.3)
SODIUM SERPL-SCNC: 141 MMOL/L (ref 133–144)

## 2022-05-12 PROCEDURE — 36415 COLL VENOUS BLD VENIPUNCTURE: CPT | Performed by: PHYSICIAN ASSISTANT

## 2022-05-12 PROCEDURE — 80048 BASIC METABOLIC PNL TOTAL CA: CPT | Performed by: PHYSICIAN ASSISTANT

## 2022-05-12 PROCEDURE — 99214 OFFICE O/P EST MOD 30 MIN: CPT | Performed by: PHYSICIAN ASSISTANT

## 2022-05-12 ASSESSMENT — PAIN SCALES - GENERAL: PAINLEVEL: NO PAIN (0)

## 2022-05-12 NOTE — PROGRESS NOTES
[facility]  [id number] [facility] [address] [facility] [address]  Phone: [phone]  Primary Provider: eBcca Trevizo  Pre-op Performing Provider: URSULA CHAND      PREOPERATIVE EVALUATION:  Today's date: 5/12/2022    Nanette Burnett is a 51 year old female who presents for a preoperative evaluation.    Surgical Information:  Surgery/Procedure: foot surgery  Surgery Location: [facility]  Surgeon: Dr Rocha  Surgery Date: 05/25/2022  Time of Surgery: 09:00am  Where patient plans to recover: At home with family  Fax number for surgical facility: [phone]     Type of Anesthesia Anticipated: to be determined    Assessment & Plan     The proposed surgical procedure is considered INTERMEDIATE risk.    Preop general physical exam  Ok for surgery.  Check BMP.  - Basic metabolic panel  (Ca, Cl, CO2, Creat, Gluc, K, Na, BUN); Future  - Basic metabolic panel  (Ca, Cl, CO2, Creat, Gluc, K, Na, BUN)    Rosenthal's neuroma of left foot  Follow with Orthopedics.    Hypertension goal BP (blood pressure) < 140/90  BP well controlled today.           Risks and Recommendations:  The patient has the following additional risks and recommendations for perioperative complications:   - No identified additional risk factors other than previously addressed    Medication Instructions:  Patient is to take all scheduled medications on the day of surgery    RECOMMENDATION:  APPROVAL GIVEN to proceed with proposed procedure, without further diagnostic evaluation.        Subjective     HPI related to upcoming procedure: patient here for pre op exam prior to foot surgery to remove a Rosenthal's Neuroma.    Preop Questions 5/12/2022   1. Have you ever had a heart attack or stroke? No   2. Have you ever had surgery on your heart or blood vessels, such as a stent placement, a coronary artery bypass, or surgery on an artery in your head, neck, heart, or legs? No   3. Do you have chest pain with activity? No   4. Do you  have a history of  heart failure? No   5. Do you currently have a cold, bronchitis or symptoms of other infection? No   6. Do you have a cough, shortness of breath, or wheezing? No   7. Do you or anyone in your family have previous history of blood clots? No   8. Do you or does anyone in your family have a serious bleeding problem such as prolonged bleeding following surgeries or cuts? No   9. Have you ever had problems with anemia or been told to take iron pills? No   10. Have you had any abnormal blood loss such as black, tarry or bloody stools, or abnormal vaginal bleeding? No   11. Have you ever had a blood transfusion? No   12. Are you willing to have a blood transfusion if it is medically needed before, during, or after your surgery? Yes   13. Have you or any of your relatives ever had problems with anesthesia? No   14. Do you have sleep apnea, excessive snoring or daytime drowsiness? No   15. Do you have any artifical heart valves or other implanted medical devices like a pacemaker, defibrillator, or continuous glucose monitor? No   16. Do you have artificial joints? No   17. Are you allergic to latex? No   18. Is there any chance that you may be pregnant? No       Health Care Directive:  Patient does not have a Health Care Directive or Living Will: Patient states has Advance Directive and will bring in a copy to clinic.    Preoperative Review of :   reviewed - no record of controlled substances prescribed.      Status of Chronic Conditions:  HYPERTENSION - Patient has longstanding history of HTN , currently denies any symptoms referable to elevated blood pressure. Specifically denies chest pain, palpitations, dyspnea, orthopnea, PND or peripheral edema. Blood pressure readings have been in normal range. Current medication regimen is as listed below. Patient denies any side effects of medication.       Review of Systems  Constitutional, neuro, ENT, endocrine, pulmonary, cardiac, gastrointestinal,  genitourinary, musculoskeletal, integument and psychiatric systems are negative, except as otherwise noted.    Patient Active Problem List    Diagnosis Date Noted     Health Care Home 03/11/2011     Priority: High     Question around dx of migraine. See phone message 3/11/11  DX V65.8 REPLACED WITH [id number] [facility] (04/08/2013)       Hypertension goal BP (blood pressure) < 140/90 05/22/2017     Priority: Medium     Anemia, unspecified type 06/01/2016     Priority: Medium     Migraine      Priority: Medium     <2-3 times per year; lasting 45 min -1 hour.    Review dx with her in the future; can remove if not felt to be true migraine. See phone message 3/11/11  Problem list name updated by automated process. Provider to review       Dysplasia of cervix 10/19/2010     Priority: Medium     TCA '98  Yearly paps through 2018 11/19/13 NIL, Endometrial cells, irregular menses, referred to Ob~Gyn, normal ultrasound  10/21/14 NIL pap  09/10/15 NIL pap  10/07/16 NIL, Neg HPV  8/2/18 NIL, Neg HPV. Plan 3 yr co-test  12/8/21 NIL pap, Neg HPV          CARDIOVASCULAR SCREENING; LDL GOAL LESS THAN 160 02/10/2010     Priority: Medium     External hemorrhoids 06/16/2007     Priority: Medium     Problem list name updated by automated process. Provider to review       Allergic state 08/21/2006     Priority: Medium     Problem list name updated by automated process. Provider to review       Anxiety state      Priority: Medium     Sees a Psychiatrist.  Problem list name updated by automated process. Provider to review       Herpes simplex virus (HSV) infection 01/31/2005     Priority: Medium     Problem list name updated by automated process. Provider to review        Past Medical History:   Diagnosis Date     Allergy, unspecified not elsewhere classified     Hayfever, better in the early 2000's     Anxiety state, unspecified      Dysplastic nevus     near toe. another suspicious mole right thigh. sees Derm - both removed      Genital herpes, unspecified     about 2 - 4 flares per year     Insomnia, unspecified      Migraine, unspecified, without mention of intractable migraine without mention of status migrainosus     will have some with aura; has others     Past Surgical History:   Procedure Laterality Date     ARTHROSCOPY KNEE Right 2016    lateral and medial meniscal tear     C/SECTION, LOW TRANSVERSE  3/05    , Low Transverse     C/SECTION, LOW TRANSVERSE       HERNIORRHAPHY INGUINAL  2012    Procedure: HERNIORRHAPHY INGUINAL;;  Surgeon: Tray Valenzuela MD;  Location: [facility] OR     LAPAROSCOPIC TUBAL LIGATION  2012    Procedure: LAPAROSCOPIC TUBAL LIGATION;  LAPAROSCOPIC TUBAL LIGATION, Right Inguinal Hernia Repair with Mesh ;  Surgeon: Gerson Burrell MD;  Location:  OR     [facility] NONSPECIFIC PROCEDURE  age 6    s/p tonsillectomy & adenoidectomy     [facility] NONSPECIFIC PROCEDURE      L knee surgery ACL-arthroscopic/open     [facility] NONSPECIFIC PROCEDURE      vein stripping x 2     [facility] NONSPECIFIC PROCEDURE      R ACL surgery     [facility] NONSPECIFIC PROCEDURE      s/p L inguinal hernia repair     [facility] NONSPECIFIC PROCEDURE      abnormal pap-TCA     [facility] NONSPECIFIC PROCEDURE      bunion surgery L     [facility] NONSPECIFIC PROCEDURE      Bilateral saphenous vein     Current Outpatient Medications   Medication Sig Dispense Refill     estradiol (ESTRACE) 0.1 MG/GM vaginal cream Place 2 g vaginally twice a week 42.5 g 4     fexofenadine (ALLEGRA) 180 MG tablet Take 1 tablet (180 mg) by mouth daily as needed for allergies (allergies (Fall)) Reported on 2017       FLUoxetine (PROZAC) 20 MG capsule Take 2 capsules (40 mg) by mouth daily 180 capsule 1     hydrOXYzine (ATARAX) 25 MG tablet TAKE 1 TABLET(25 MG) BY MOUTH EVERY NIGHT AS NEEDED FOR ANXIETY OR SLEEP 30 tablet 1     lisinopril (ZESTRIL) 20 MG tablet Take 1 tablet (20 mg) by mouth daily 90 tablet 3     LORazepam (ATIVAN) 0.5 MG tablet  "Take 0.5-1 tablets (0.25-0.5 mg) by mouth every 8 hours as needed for anxiety 10 tablet 0     SUMAtriptan (IMITREX) 50 MG tablet TAKE 1 TABLET(50 MG) BY MOUTH AT ONSET OF HEADACHE FOR MIGRAINE. MAY REPEAT DOSE IN 2 HOURS. DO NOT EXCEED 200 MG IN 24 HOURS 12 tablet 5     triamcinolone (KENALOG) 0.1 % cream Apply sparingly to affected area three times daily as needed 45 g 0     valACYclovir (VALTREX) 500 MG tablet TAKE 1 TABLET BY MOUTH TWICE DAILY FOR 3 DAYS WITH FLARES 24 tablet 3       Allergies   Allergen Reactions     No Known Drug Allergies         Social History     Tobacco Use     Smoking status: Never Smoker     Smokeless tobacco: Never Used     Tobacco comment: occasionally in the past - social   Substance Use Topics     Alcohol use: Yes     Alcohol/week: 0.0 - 1.0 standard drinks       History   Drug Use No         Objective     /88 (BP Location: Right arm, Patient Position: Sitting, Cuff Size: Adult Large)   Pulse 78   Temp 98.2  F (36.8  C) (Tympanic)   Resp 14   Ht 1.753 m (5' 9\")   Wt 86.2 kg (190 lb)   SpO2 98%   BMI 28.06 kg/m      Physical Exam    GENERAL APPEARANCE: healthy, alert and no distress     EYES: EOMI, PERRL     HENT: ear canals and TM's normal and nose and mouth without ulcers or lesions     NECK: no adenopathy, no asymmetry, masses, or scars and thyroid normal to palpation     RESP: lungs clear to auscultation - no rales, rhonchi or wheezes     CV: regular rates and rhythm, normal S1 S2, no S3 or S4 and no murmur, click or rub     MS: extremities normal- no gross deformities noted, no evidence of inflammation in joints, FROM in all extremities.     SKIN: no suspicious lesions or rashes     NEURO: Normal strength and tone, sensory exam grossly normal, mentation intact and speech normal     PSYCH: mentation appears normal. and affect normal/bright     LYMPHATICS: No cervical adenopathy    Recent Labs   Lab Test 12/08/21  0922 02/05/21  0958    138   POTASSIUM 5.3 4.5 "   CR 0.67 0.65        Diagnostics:  Labs pending at this time.  Results will be reviewed when available.   No EKG required, no history of coronary heart disease, significant arrhythmia, peripheral arterial disease or other structural heart disease.    Revised Cardiac Risk Index (RCRI):  The patient has the following serious cardiovascular risks for perioperative complications:   - No serious cardiac risks = 0 points     RCRI Interpretation: 0 points: Class I (very low risk - 0.4% complication rate)           Signed Electronically by: Vargas Kumari PA-C  Copy of this evaluation report is provided to requesting physician.

## 2022-05-12 NOTE — PATIENT INSTRUCTIONS
Preparing for Your Surgery  Getting started  A nurse will call you to review your health history and instructions. They will give you an arrival time based on your scheduled surgery time. Please be ready to share:    Your doctor's clinic name and phone number    Your medical, surgical and anesthesia history    A list of allergies and sensitivities    A list of medicines, including herbal treatments and over-the-counter drugs    Whether the patient has a legal guardian (ask how to send us the papers in advance)  Please tell us if you're pregnant--or if there's any chance you might be pregnant. Some surgeries may injure a fetus (unborn baby), so they require a pregnancy test. Surgeries that are safe for a fetus don't always need a test, and you can choose whether to have one.   If you have a child who's having surgery, please ask for a copy of Preparing for Your Child's Surgery.    Preparing for surgery    Within 30 days of surgery: Have a pre-op exam (sometimes called an H&P, or History and Physical). This can be done at a clinic or pre-operative center.  ? If you're having a , you may not need this exam. Talk to your care team.    At your pre-op exam, talk to your care team about all medicines you take. If you need to stop any medicines before surgery, ask when to start taking them again.  ? We do this for your safety. Many medicines can make you bleed too much during surgery. Some change how well surgery (anesthesia) drugs work.    Call your insurance company to let them know you're having surgery. (If you don't have insurance, call [phone].)    Call your clinic if there's any change in your health. This includes signs of a cold or flu (sore throat, runny nose, cough, rash, fever). It also includes a scrape or scratch near the surgery site.    If you have questions on the day of surgery, call your hospital or surgery center.  COVID testing  You may need to be tested for COVID-19 before having  surgery. If so, we will give you instructions.  Eating and drinking guidelines  For your safety: Unless your surgeon tells you otherwise, follow the guidelines below.    Eat and drink as usual until 8 hours before surgery. After that, no food or milk.    Drink clear liquids until 2 hours before surgery. These are liquids you can see through, like water, Gatorade and Propel Water. You may also have black coffee and tea (no cream or milk).    Nothing by mouth within 2 hours of surgery. This includes gum, candy and breath mints.    If you drink alcohol: Stop drinking it the night before surgery.    If your care team tells you to take medicine on the morning of surgery, it's okay to take it with a sip of water.  Preventing infection    Shower or bathe the night before and morning of your surgery. Follow the instructions your clinic gave you. (If no instructions, use regular soap.)    Don't shave or clip hair near your surgery site. We'll remove the hair if needed.    Don't smoke or vape the morning of surgery. You may chew nicotine gum up to 2 hours before surgery. A nicotine patch is okay.  ? Note: Some surgeries require you to completely quit smoking and nicotine. Check with your surgeon.    Your care team will make every effort to keep you safe from infection. We will:  ? Clean our hands often with soap and water (or an alcohol-based hand rub).  ? Clean the skin at your surgery site with a special soap that kills germs.  ? Give you a special gown to keep you warm. (Cold raises the risk of infection.)  ? Wear special hair covers, masks, gowns and gloves during surgery.  ? Give antibiotic medicine, if prescribed. Not all surgeries need antibiotics.  What to bring on the day of surgery    Photo ID and insurance card    Copy of your health care directive, if you have one    Glasses and hearing aides (bring cases)  ? You can't wear contacts during surgery    Inhaler and eye drops, if you use them (tell us about these when  you arrive)    CPAP machine or breathing device, if you use them    A few personal items, if spending the night    If you have . . .  ? A pacemaker, ICD (cardiac defibrillator) or other implant: Bring the ID card.  ? An implanted stimulator: Bring the remote control.  ? A legal guardian: Bring a copy of the certified (court-stamped) guardianship papers.  Please remove any jewelry, including body piercings. Leave jewelry and other valuables at home.  If you're going home the day of surgery    You must have a responsible adult drive you home. They should stay with you overnight as well.    If you don't have someone to stay with you, and you aren't safe to go home alone, we may keep you overnight. Insurance often won't pay for this.  After surgery  If it's hard to control your pain or you need more pain medicine, please call your surgeon's office.  Questions?   If you have any questions for your care team, list them here: _________________________________________________________________________________________________________________________________________________________________________ ____________________________________ ____________________________________ ____________________________________  For informational purposes only. Not to replace the advice of your health care provider. Copyright   2003, 2019 [facility]. All rights reserved. Clinically reviewed by Mckenna Phillips MD. ELIKE [id number] - REV 07/21.

## 2022-05-25 ENCOUNTER — LAB REQUISITION (OUTPATIENT)
Dept: LAB | Facility: CLINIC | Age: 52
End: 2022-05-25
Payer: COMMERCIAL

## 2022-05-25 PROCEDURE — 88304 TISSUE EXAM BY PATHOLOGIST: CPT | Mod: 26 | Performed by: PATHOLOGY

## 2022-05-25 PROCEDURE — 88304 TISSUE EXAM BY PATHOLOGIST: CPT | Mod: TC,ORL | Performed by: ORTHOPAEDIC SURGERY

## 2022-05-26 LAB
PATH REPORT.COMMENTS IMP SPEC: NORMAL
PATH REPORT.COMMENTS IMP SPEC: NORMAL
PATH REPORT.FINAL DX SPEC: NORMAL
PATH REPORT.GROSS SPEC: NORMAL
PATH REPORT.MICROSCOPIC SPEC OTHER STN: NORMAL
PATH REPORT.RELEVANT HX SPEC: NORMAL
PHOTO IMAGE: NORMAL

## 2022-06-09 ENCOUNTER — TRANSFERRED RECORDS (OUTPATIENT)
Dept: HEALTH INFORMATION MANAGEMENT | Facility: CLINIC | Age: 52
End: 2022-06-09
Payer: COMMERCIAL

## 2022-06-09 DIAGNOSIS — F41.1 ANXIETY STATE: ICD-10-CM

## 2022-06-09 RX ORDER — HYDROXYZINE HYDROCHLORIDE 25 MG/1
TABLET, FILM COATED ORAL
Qty: 30 TABLET | Refills: 1 | Status: SHIPPED | OUTPATIENT
Start: 2022-06-09 | End: 2022-08-16

## 2022-08-09 ENCOUNTER — TRANSFERRED RECORDS (OUTPATIENT)
Dept: HEALTH INFORMATION MANAGEMENT | Facility: CLINIC | Age: 52
End: 2022-08-09

## 2022-08-14 DIAGNOSIS — F41.1 ANXIETY STATE: ICD-10-CM

## 2022-08-16 RX ORDER — HYDROXYZINE HYDROCHLORIDE 25 MG/1
TABLET, FILM COATED ORAL
Qty: 30 TABLET | Refills: 1 | Status: SHIPPED | OUTPATIENT
Start: 2022-08-16 | End: 2022-10-04

## 2022-09-27 ASSESSMENT — ANXIETY QUESTIONNAIRES
1. FEELING NERVOUS, ANXIOUS, OR ON EDGE: NEARLY EVERY DAY
5. BEING SO RESTLESS THAT IT IS HARD TO SIT STILL: NOT AT ALL
3. WORRYING TOO MUCH ABOUT DIFFERENT THINGS: NEARLY EVERY DAY
GAD7 TOTAL SCORE: 13
6. BECOMING EASILY ANNOYED OR IRRITABLE: NEARLY EVERY DAY
7. FEELING AFRAID AS IF SOMETHING AWFUL MIGHT HAPPEN: SEVERAL DAYS
IF YOU CHECKED OFF ANY PROBLEMS ON THIS QUESTIONNAIRE, HOW DIFFICULT HAVE THESE PROBLEMS MADE IT FOR YOU TO DO YOUR WORK, TAKE CARE OF THINGS AT HOME, OR GET ALONG WITH OTHER PEOPLE: SOMEWHAT DIFFICULT
4. TROUBLE RELAXING: SEVERAL DAYS
8. IF YOU CHECKED OFF ANY PROBLEMS, HOW DIFFICULT HAVE THESE MADE IT FOR YOU TO DO YOUR WORK, TAKE CARE OF THINGS AT HOME, OR GET ALONG WITH OTHER PEOPLE?: SOMEWHAT DIFFICULT
GAD7 TOTAL SCORE: 13
GAD7 TOTAL SCORE: 13
7. FEELING AFRAID AS IF SOMETHING AWFUL MIGHT HAPPEN: SEVERAL DAYS
2. NOT BEING ABLE TO STOP OR CONTROL WORRYING: MORE THAN HALF THE DAYS

## 2022-10-04 ENCOUNTER — VIRTUAL VISIT (OUTPATIENT)
Dept: FAMILY MEDICINE | Facility: CLINIC | Age: 52
End: 2022-10-04
Payer: COMMERCIAL

## 2022-10-04 DIAGNOSIS — F41.1 ANXIETY STATE: ICD-10-CM

## 2022-10-04 PROCEDURE — 99213 OFFICE O/P EST LOW 20 MIN: CPT | Mod: 95 | Performed by: FAMILY MEDICINE

## 2022-10-04 RX ORDER — ESCITALOPRAM OXALATE 10 MG/1
10 TABLET ORAL DAILY
Qty: 30 TABLET | Refills: 3 | Status: SHIPPED | OUTPATIENT
Start: 2022-10-04 | End: 2022-10-06

## 2022-10-04 RX ORDER — TRAZODONE HYDROCHLORIDE 50 MG/1
TABLET, FILM COATED ORAL
Qty: 90 TABLET | Refills: 3 | Status: SHIPPED | OUTPATIENT
Start: 2022-10-04 | End: 2023-01-25 | Stop reason: SINTOL

## 2022-10-04 RX ORDER — HYDROXYZINE HYDROCHLORIDE 25 MG/1
TABLET, FILM COATED ORAL
Qty: 30 TABLET | Refills: 11 | Status: SHIPPED | OUTPATIENT
Start: 2022-10-04 | End: 2022-10-14

## 2022-10-04 ASSESSMENT — ANXIETY QUESTIONNAIRES: GAD7 TOTAL SCORE: 13

## 2022-10-04 ASSESSMENT — ENCOUNTER SYMPTOMS: NERVOUS/ANXIOUS: 1

## 2022-10-04 NOTE — PROGRESS NOTES
"Nanette is a 52 year old who is being evaluated via a billable video visit.      How would you like to obtain your AVS? MyChart  If the video visit is dropped, the invitation should be resent by: Text to cell phone: [phone]  Will anyone else be joining your video visit? No    ANXIETY WORSENING  TROUBLE SLEEPING  FOLLOW UP ON FLUOXETINE    Assessment & Plan     Anxiety state  Getting worse and not sleeping great  Will stop fluoxetine and start lexapro  After a few weeks can try trazodone for sleep, start with only one tab at night and can work up to 3 if need be  The potential side effects of the prescription medication were discussed with the patient.     - PRIMARY CARE FOLLOW-UP SCHEDULING  - hydrOXYzine (ATARAX) 25 MG tablet  Dispense: 30 tablet; Refill: 11  - escitalopram (LEXAPRO) 10 MG tablet  Dispense: 30 tablet; Refill: 3  - traZODone (DESYREL) 50 MG tablet  Dispense: 90 tablet; Refill: 3        25 minutes spent on the date of the encounter doing chart review, review of test results, patient visit and documentation        BMI:   Estimated body mass index is 28.06 kg/m  as calculated from the following:    Height as of 5/12/22: 1.753 m (5' 9\").    Weight as of 5/12/22: 86.2 kg (190 lb).       See Patient Instructions    No follow-ups on file.   Follow-up Visit   Expected date:  Dec 04, 2022 (Approximate)      Follow Up Appointment Details:     Follow-up with whom?: Me    Follow-Up for what?: Adult Preventive    How?: In Person    Is this an as-needed follow-up?: No                    Claudia Erickson MD  [facility]    Subjective   Nanette is a 52 year old, presenting for the following health issues:  Recheck Medication (Prozac- doesn't feel like it's working anymore, would like to discuss other options. Interested in Lexapro.) and Anxiety (Follow up)      Anxiety    History of Present Illness       Mental Health Follow-up:  Patient presents to follow-up on Anxiety.    Patient's " anxiety since last visit has been:  Worse  The patient is not having other symptoms associated with anxiety.  Any significant life events: No  Patient is feeling anxious or having panic attacks.  Patient has no concerns about alcohol or drug use.    She eats 0-1 servings of fruits and vegetables daily.She consumes 0 sweetened beverage(s) daily.She exercises with enough effort to increase her heart rate 30 to 60 minutes per day.  She exercises with enough effort to increase her heart rate 5 days per week. She is missing 1 dose(s) of medications per week.  She is not taking prescribed medications regularly due to remembering to take.  Today's HAO-7 Score: 13       Anxiety Follow-Up    How are you doing with your anxiety since your last visit? Worsened- has definitely increased, GI issues, jittery, trouble sleeping    Are you having other symptoms that might be associated with anxiety? Yes- GI issues, irritable/on edge    Have you had a significant life event? No     Are you feeling depressed? No    Do you have any concerns with your use of alcohol or other drugs? No    Social History     Tobacco Use     Smoking status: Never Smoker     Smokeless tobacco: Never Used     Tobacco comment: occasionally in the past - social   Vaping Use     Vaping Use: Never used   Substance Use Topics     Alcohol use: Yes     Alcohol/week: 0.0 - 1.0 standard drinks     Drug use: No     HAO-7 SCORE 12/8/2021 12/23/2021 9/27/2022   Total Score - - -   Total Score - 6 (mild anxiety) 13 (moderate anxiety)   Total Score 3 6 13     PHQ 6/22/2020 11/12/2020 12/8/2021   PHQ-9 Total Score 3 3 5   Q9: Thoughts of better off dead/self-harm past 2 weeks Not at all Not at all Not at all     Last PHQ-9 12/8/2021   1.  Little interest or pleasure in doing things 0   2.  Feeling down, depressed, or hopeless 0   3.  Trouble falling or staying asleep, or sleeping too much 2   4.  Feeling tired or having little energy 1   5.  Poor appetite or overeating 0    6.  Feeling bad about yourself 1   7.  Trouble concentrating 1   8.  Moving slowly or restless 0   Q9: Thoughts of better off dead/self-harm past 2 weeks 0   PHQ-9 Total Score 5   Difficulty at work, home, or with people Not difficult at all     HAO-7  2022   1. Feeling nervous, anxious, or on edge 3   2. Not being able to stop or control worrying 2   3. Worrying too much about different things 3   4. Trouble relaxing 1   5. Being so restless that it is hard to sit still 0   6. Becoming easily annoyed or irritable 3   7. Feeling afraid, as if something awful might happen 1   HAO-7 Total Score 13   If you checked any problems, how difficult have they made it for you to do your work, take care of things at home, or get along with other people? Somewhat difficult       Medication Followup of Prozac        Taking Medication as prescribed: yes    Side Effects: not working as well as it used to    Medication Helping Symptoms:  NO- at least not as well as it used to    Past Medical History:   Diagnosis Date     Allergy, unspecified not elsewhere classified     Hayfever, better in the early      Anxiety state, unspecified      Dysplastic nevus     near toe. another suspicious mole right thigh. sees Derm - both removed     Genital herpes, unspecified     about 2 - 4 flares per year     Insomnia, unspecified      Migraine, unspecified, without mention of intractable migraine without mention of status migrainosus     will have some with aura; has others       Past Surgical History:   Procedure Laterality Date     ARTHROSCOPY KNEE Right 2016    lateral and medial meniscal tear     C/SECTION, LOW TRANSVERSE  3/05    , Low Transverse     C/SECTION, LOW TRANSVERSE       HERNIORRHAPHY INGUINAL  2012    Procedure: HERNIORRHAPHY INGUINAL;;  Surgeon: Tray Valenzuela MD;  Location: [facility] OR     LAPAROSCOPIC TUBAL LIGATION  2012    Procedure: LAPAROSCOPIC TUBAL LIGATION;  LAPAROSCOPIC TUBAL  LIGATION, Right Inguinal Hernia Repair with Mesh ;  Surgeon: Gerson Burrell MD;  Location:  OR     [facility] NONSPECIFIC PROCEDURE  age 6    s/p tonsillectomy & adenoidectomy     [facility] NONSPECIFIC PROCEDURE  6/94    L knee surgery ACL-arthroscopic/open     [facility] NONSPECIFIC PROCEDURE  8/95    vein stripping x 2     [facility] NONSPECIFIC PROCEDURE  8/97    R ACL surgery     [facility] NONSPECIFIC PROCEDURE  1995    s/p L inguinal hernia repair     [facility] NONSPECIFIC PROCEDURE  8/98    abnormal pap-TCA     [facility] NONSPECIFIC PROCEDURE  7/02    bunion surgery L     [facility] NONSPECIFIC PROCEDURE  7/01    Bilateral saphenous vein       MEDICATIONS:  Current Outpatient Medications   Medication     estradiol (ESTRACE) 0.1 MG/GM vaginal cream     fexofenadine (ALLEGRA) 180 MG tablet     FLUoxetine (PROZAC) 20 MG capsule     hydrOXYzine (ATARAX) 25 MG tablet     lisinopril (ZESTRIL) 20 MG tablet     SUMAtriptan (IMITREX) 50 MG tablet     triamcinolone (KENALOG) 0.1 % cream     valACYclovir (VALTREX) 500 MG tablet     No current facility-administered medications for this visit.       SOCIAL HISTORY:  Social History     Tobacco Use     Smoking status: Never Smoker     Smokeless tobacco: Never Used     Tobacco comment: occasionally in the past - social   Substance Use Topics     Alcohol use: Yes     Alcohol/week: 0.0 - 1.0 standard drinks       Family History   Problem Relation Age of Onset     Cancer Other         Prostate CA     Hypertension Father      Neurologic Disorder Father         ALS     Thyroid Disease Mother         actually hypoglycemia; not thyroid.     Hypertension Maternal Grandmother      Cerebrovascular Disease Maternal Grandmother      Cancer Maternal Grandmother         ovarian/uterine dx at age 50's     Breast Cancer Paternal Grandmother         dx at age 60's       Review of Systems   Psychiatric/Behavioral: The patient is nervous/anxious.          Objective    Vitals - Patient Reported  Weight (Patient Reported): 81.6 kg (180  "lb)  Height (Patient Reported): 175.3 cm (5' 9\")  BMI (Based on Pt Reported Ht/Wt): 26.58      Vitals:  No vitals were obtained today due to virtual visit.    Physical Exam   GENERAL: Healthy, alert and no distress  EYES: Eyes grossly normal to inspection.  No discharge or erythema, or obvious scleral/conjunctival abnormalities.  RESP: No audible wheeze, cough, or visible cyanosis.  No visible retractions or increased work of breathing.    SKIN: Visible skin clear. No significant rash, abnormal pigmentation or lesions.  NEURO: Cranial nerves grossly intact.  Mentation and speech appropriate for age.  PSYCH: Mentation appears normal, affect normal/bright, judgement and insight intact, normal speech and appearance well-groomed.    Lab Requisition on 05/25/2022   Component Date Value Ref Range Status     Case Report 05/25/2022    Final                    Value:Surgical Pathology Report                         Case: [id number]                                  Authorizing Provider:  Michael Rocha MD      Collected:           05/25/2022 08:20 AM          Ordering Location:     [facility]   Received:            05/25/2022 12:56 PM                                 Hospital                                                                     Pathologist:           Carlos Scott MD                                                                           Specimen:    Foot, Left, Third space                                                                     Final Diagnosis 05/25/2022    Final                    Value:This result contains rich text formatting which cannot be displayed here.     Clinical Information 05/25/2022    Final                    Value:This result contains rich text formatting which cannot be displayed here.     Gross Description 05/25/2022    Final                    Value:This result contains rich " text formatting which cannot be displayed here.     Microscopic Description 05/25/2022    Final                    Value:This result contains rich text formatting which cannot be displayed here.     Performing Labs 05/25/2022    Final                    Value:This result contains rich text formatting which cannot be displayed here.               Video-Visit Details    Video Start Time: 11:25 AM    Type of service:  Video Visit    Video End Time:11:46 AM    Originating Location (pt. Location): Home    Distant Location (provider location):  [facility] [employer]     Platform used for Video Visit: [employer]

## 2022-10-10 RX ORDER — ESCITALOPRAM OXALATE 10 MG/1
TABLET ORAL
Qty: 90 TABLET | Refills: 0 | Status: SHIPPED | OUTPATIENT
Start: 2022-10-10 | End: 2023-01-25

## 2022-10-11 ENCOUNTER — MYC MEDICAL ADVICE (OUTPATIENT)
Dept: FAMILY MEDICINE | Facility: CLINIC | Age: 52
End: 2022-10-11

## 2022-10-13 DIAGNOSIS — F41.1 ANXIETY STATE: ICD-10-CM

## 2022-10-14 RX ORDER — HYDROXYZINE HYDROCHLORIDE 25 MG/1
TABLET, FILM COATED ORAL
Qty: 30 TABLET | Refills: 11 | Status: SHIPPED | OUTPATIENT
Start: 2022-10-14 | End: 2024-04-03

## 2022-10-14 NOTE — TELEPHONE ENCOUNTER
Prescription approved per [facility] Refill Protocol.  Pushpa Montelongo RN, BSN, PHN  [facility]    This was initially supposed to be add to profile, processing refill.

## 2022-10-22 ENCOUNTER — HEALTH MAINTENANCE LETTER (OUTPATIENT)
Age: 52
End: 2022-10-22

## 2022-12-27 DIAGNOSIS — B00.9 HERPES SIMPLEX VIRUS (HSV) INFECTION: ICD-10-CM

## 2022-12-27 DIAGNOSIS — G43.109 MIGRAINE WITH AURA AND WITHOUT STATUS MIGRAINOSUS, NOT INTRACTABLE: ICD-10-CM

## 2022-12-27 NOTE — TELEPHONE ENCOUNTER
Routing refill request to provider for review/approval because:    Called pt at [phone] to request the frequency of imitrex use.     Pt reports that she is not sure, her best guess is that she is using approximately 10 tablets per month.     Routing refill to provider for review.     Pushpa Santos RN  PAL (Patient Advocate Liason)  [facility]

## 2022-12-27 NOTE — TELEPHONE ENCOUNTER
Called pt at [phone] to request the frequency of imitrex use.     Pt reports that she is not sure, her best guess is that she is using approximately 10 tablets per month.     Routing refill to provider for review.     SAJI Lucas (Patient Advocate Liason)  [facility]

## 2022-12-28 RX ORDER — VALACYCLOVIR HYDROCHLORIDE 500 MG/1
TABLET, FILM COATED ORAL
Qty: 24 TABLET | Refills: 3 | Status: SHIPPED | OUTPATIENT
Start: 2022-12-28 | End: 2023-12-14

## 2022-12-28 RX ORDER — SUMATRIPTAN 50 MG/1
TABLET, FILM COATED ORAL
Qty: 12 TABLET | Refills: 5 | Status: SHIPPED | OUTPATIENT
Start: 2022-12-28 | End: 2023-12-14

## 2023-01-05 NOTE — TELEPHONE ENCOUNTER
Sent [facility] message informing appointment due in next few months  Becca Virk RN, BSN  [facility]

## 2023-01-20 ENCOUNTER — ANCILLARY PROCEDURE (OUTPATIENT)
Dept: MAMMOGRAPHY | Facility: CLINIC | Age: 53
End: 2023-01-20
Payer: COMMERCIAL

## 2023-01-20 DIAGNOSIS — Z12.31 VISIT FOR SCREENING MAMMOGRAM: ICD-10-CM

## 2023-01-20 PROCEDURE — 77067 SCR MAMMO BI INCL CAD: CPT | Mod: TC | Performed by: STUDENT IN AN ORGANIZED HEALTH CARE EDUCATION/TRAINING PROGRAM

## 2023-01-20 PROCEDURE — 77063 BREAST TOMOSYNTHESIS BI: CPT | Mod: TC | Performed by: STUDENT IN AN ORGANIZED HEALTH CARE EDUCATION/TRAINING PROGRAM

## 2023-01-23 SDOH — HEALTH STABILITY: PHYSICAL HEALTH: ON AVERAGE, HOW MANY DAYS PER WEEK DO YOU ENGAGE IN MODERATE TO STRENUOUS EXERCISE (LIKE A BRISK WALK)?: 4 DAYS

## 2023-01-23 SDOH — HEALTH STABILITY: PHYSICAL HEALTH: ON AVERAGE, HOW MANY MINUTES DO YOU ENGAGE IN EXERCISE AT THIS LEVEL?: 40 MIN

## 2023-01-23 SDOH — ECONOMIC STABILITY: TRANSPORTATION INSECURITY
IN THE PAST 12 MONTHS, HAS THE LACK OF TRANSPORTATION KEPT YOU FROM MEDICAL APPOINTMENTS OR FROM GETTING MEDICATIONS?: NO

## 2023-01-23 SDOH — ECONOMIC STABILITY: TRANSPORTATION INSECURITY
IN THE PAST 12 MONTHS, HAS LACK OF TRANSPORTATION KEPT YOU FROM MEETINGS, WORK, OR FROM GETTING THINGS NEEDED FOR DAILY LIVING?: NO

## 2023-01-23 SDOH — ECONOMIC STABILITY: INCOME INSECURITY: IN THE LAST 12 MONTHS, WAS THERE A TIME WHEN YOU WERE NOT ABLE TO PAY THE MORTGAGE OR RENT ON TIME?: NO

## 2023-01-23 SDOH — ECONOMIC STABILITY: INCOME INSECURITY: HOW HARD IS IT FOR YOU TO PAY FOR THE VERY BASICS LIKE FOOD, HOUSING, MEDICAL CARE, AND HEATING?: NOT HARD AT ALL

## 2023-01-23 SDOH — ECONOMIC STABILITY: FOOD INSECURITY: WITHIN THE PAST 12 MONTHS, YOU WORRIED THAT YOUR FOOD WOULD RUN OUT BEFORE YOU GOT MONEY TO BUY MORE.: NEVER TRUE

## 2023-01-23 SDOH — ECONOMIC STABILITY: FOOD INSECURITY: WITHIN THE PAST 12 MONTHS, THE FOOD YOU BOUGHT JUST DIDN'T LAST AND YOU DIDN'T HAVE MONEY TO GET MORE.: NEVER TRUE

## 2023-01-23 ASSESSMENT — ENCOUNTER SYMPTOMS
HEADACHES: 1
NERVOUS/ANXIOUS: 1
HEARTBURN: 0
PARESTHESIAS: 1
SORE THROAT: 0
CONSTIPATION: 0
DYSURIA: 0
SHORTNESS OF BREATH: 0
HEMATOCHEZIA: 0
PALPITATIONS: 0
DIARRHEA: 0
BREAST MASS: 0
FEVER: 0
DIZZINESS: 0
NAUSEA: 0
MYALGIAS: 1
ARTHRALGIAS: 1
JOINT SWELLING: 0
ABDOMINAL PAIN: 0
WEAKNESS: 0
COUGH: 0
HEMATURIA: 0
EYE PAIN: 0
CHILLS: 0
FREQUENCY: 0

## 2023-01-23 ASSESSMENT — SOCIAL DETERMINANTS OF HEALTH (SDOH)
DO YOU BELONG TO ANY CLUBS OR ORGANIZATIONS SUCH AS CHURCH GROUPS UNIONS, FRATERNAL OR ATHLETIC GROUPS, OR SCHOOL GROUPS?: NO
HOW OFTEN DO YOU GET TOGETHER WITH FRIENDS OR RELATIVES?: TWICE A WEEK
IN A TYPICAL WEEK, HOW MANY TIMES DO YOU TALK ON THE PHONE WITH FAMILY, FRIENDS, OR NEIGHBORS?: TWICE A WEEK
HOW OFTEN DO YOU ATTEND CHURCH OR RELIGIOUS SERVICES?: MORE THAN 4 TIMES PER YEAR

## 2023-01-23 ASSESSMENT — LIFESTYLE VARIABLES
HOW OFTEN DO YOU HAVE A DRINK CONTAINING ALCOHOL: 2-3 TIMES A WEEK
AUDIT-C TOTAL SCORE: 3
HOW OFTEN DO YOU HAVE SIX OR MORE DRINKS ON ONE OCCASION: NEVER
HOW MANY STANDARD DRINKS CONTAINING ALCOHOL DO YOU HAVE ON A TYPICAL DAY: 1 OR 2
SKIP TO QUESTIONS 9-10: 1

## 2023-01-25 ENCOUNTER — OFFICE VISIT (OUTPATIENT)
Dept: FAMILY MEDICINE | Facility: CLINIC | Age: 53
End: 2023-01-25
Attending: FAMILY MEDICINE
Payer: COMMERCIAL

## 2023-01-25 ENCOUNTER — TELEPHONE (OUTPATIENT)
Dept: FAMILY MEDICINE | Facility: CLINIC | Age: 53
End: 2023-01-25

## 2023-01-25 ENCOUNTER — ANCILLARY PROCEDURE (OUTPATIENT)
Dept: GENERAL RADIOLOGY | Facility: CLINIC | Age: 53
End: 2023-01-25
Attending: FAMILY MEDICINE
Payer: COMMERCIAL

## 2023-01-25 VITALS
SYSTOLIC BLOOD PRESSURE: 117 MMHG | RESPIRATION RATE: 13 BRPM | DIASTOLIC BLOOD PRESSURE: 82 MMHG | OXYGEN SATURATION: 99 % | BODY MASS INDEX: 29.83 KG/M2 | HEART RATE: 78 BPM | TEMPERATURE: 98.3 F | HEIGHT: 69 IN | WEIGHT: 201.4 LBS

## 2023-01-25 DIAGNOSIS — M25.511 RIGHT SHOULDER PAIN, UNSPECIFIED CHRONICITY: Primary | ICD-10-CM

## 2023-01-25 DIAGNOSIS — M25.551 HIP PAIN, RIGHT: ICD-10-CM

## 2023-01-25 DIAGNOSIS — Z00.00 ROUTINE HISTORY AND PHYSICAL EXAMINATION OF ADULT: ICD-10-CM

## 2023-01-25 DIAGNOSIS — F41.1 ANXIETY STATE: ICD-10-CM

## 2023-01-25 DIAGNOSIS — I10 HYPERTENSION GOAL BP (BLOOD PRESSURE) < 140/90: ICD-10-CM

## 2023-01-25 DIAGNOSIS — M77.11 LATERAL EPICONDYLITIS OF RIGHT ELBOW: ICD-10-CM

## 2023-01-25 LAB
ANION GAP SERPL CALCULATED.3IONS-SCNC: 12 MMOL/L (ref 7–15)
BUN SERPL-MCNC: 13.3 MG/DL (ref 6–20)
CALCIUM SERPL-MCNC: 9.4 MG/DL (ref 8.6–10)
CHLORIDE SERPL-SCNC: 104 MMOL/L (ref 98–107)
CREAT SERPL-MCNC: 0.74 MG/DL (ref 0.51–0.95)
CREAT UR-MCNC: 68 MG/DL
DEPRECATED HCO3 PLAS-SCNC: 24 MMOL/L (ref 22–29)
ERYTHROCYTE [DISTWIDTH] IN BLOOD BY AUTOMATED COUNT: 13.7 % (ref 10–15)
GFR SERPL CREATININE-BSD FRML MDRD: >90 ML/MIN/1.73M2
GLUCOSE SERPL-MCNC: 86 MG/DL (ref 70–99)
HCT VFR BLD AUTO: 41.8 % (ref 35–47)
HGB BLD-MCNC: 13.5 G/DL (ref 11.7–15.7)
MCH RBC QN AUTO: 30.3 PG (ref 26.5–33)
MCHC RBC AUTO-ENTMCNC: 32.3 G/DL (ref 31.5–36.5)
MCV RBC AUTO: 94 FL (ref 78–100)
MICROALBUMIN UR-MCNC: 14.3 MG/L
MICROALBUMIN/CREAT UR: 21.03 MG/G CR (ref 0–25)
PLATELET # BLD AUTO: 226 10E3/UL (ref 150–450)
POTASSIUM SERPL-SCNC: 4.4 MMOL/L (ref 3.4–5.3)
RBC # BLD AUTO: 4.45 10E6/UL (ref 3.8–5.2)
SODIUM SERPL-SCNC: 140 MMOL/L (ref 136–145)
WBC # BLD AUTO: 5.1 10E3/UL (ref 4–11)

## 2023-01-25 PROCEDURE — 90471 IMMUNIZATION ADMIN: CPT | Performed by: FAMILY MEDICINE

## 2023-01-25 PROCEDURE — 73502 X-RAY EXAM HIP UNI 2-3 VIEWS: CPT | Mod: TC | Performed by: RADIOLOGY

## 2023-01-25 PROCEDURE — 90682 RIV4 VACC RECOMBINANT DNA IM: CPT | Performed by: FAMILY MEDICINE

## 2023-01-25 PROCEDURE — 99396 PREV VISIT EST AGE 40-64: CPT | Mod: 25 | Performed by: FAMILY MEDICINE

## 2023-01-25 PROCEDURE — 36415 COLL VENOUS BLD VENIPUNCTURE: CPT | Performed by: FAMILY MEDICINE

## 2023-01-25 PROCEDURE — 80048 BASIC METABOLIC PNL TOTAL CA: CPT | Performed by: FAMILY MEDICINE

## 2023-01-25 PROCEDURE — 85027 COMPLETE CBC AUTOMATED: CPT | Performed by: FAMILY MEDICINE

## 2023-01-25 PROCEDURE — 82570 ASSAY OF URINE CREATININE: CPT | Performed by: FAMILY MEDICINE

## 2023-01-25 PROCEDURE — 99214 OFFICE O/P EST MOD 30 MIN: CPT | Mod: 25 | Performed by: FAMILY MEDICINE

## 2023-01-25 PROCEDURE — 82043 UR ALBUMIN QUANTITATIVE: CPT | Performed by: FAMILY MEDICINE

## 2023-01-25 RX ORDER — LISINOPRIL 20 MG/1
20 TABLET ORAL DAILY
Qty: 90 TABLET | Refills: 3 | Status: SHIPPED | OUTPATIENT
Start: 2023-01-25 | End: 2024-02-21

## 2023-01-25 RX ORDER — ESCITALOPRAM OXALATE 10 MG/1
10 TABLET ORAL DAILY
Qty: 90 TABLET | Refills: 3 | Status: SHIPPED | OUTPATIENT
Start: 2023-01-25 | End: 2024-01-18

## 2023-01-25 RX ORDER — BUPROPION HYDROCHLORIDE 150 MG/1
150 TABLET, EXTENDED RELEASE ORAL AT BEDTIME
Qty: 30 TABLET | Refills: 3 | Status: SHIPPED | OUTPATIENT
Start: 2023-01-25 | End: 2023-05-17

## 2023-01-25 ASSESSMENT — ENCOUNTER SYMPTOMS
FREQUENCY: 0
CONSTIPATION: 0
DYSURIA: 0
PALPITATIONS: 0
HEARTBURN: 0
MYALGIAS: 1
HEMATOCHEZIA: 0
EYE PAIN: 0
JOINT SWELLING: 0
CHILLS: 0
HEADACHES: 1
SORE THROAT: 0
FEVER: 0
DIZZINESS: 0
NAUSEA: 0
PARESTHESIAS: 1
DIARRHEA: 0
BREAST MASS: 0
SHORTNESS OF BREATH: 0
HEMATURIA: 0
COUGH: 0
WEAKNESS: 0
ARTHRALGIAS: 1
NERVOUS/ANXIOUS: 1
ABDOMINAL PAIN: 0

## 2023-01-25 NOTE — TELEPHONE ENCOUNTER
----- Message from Claudia Erickson MD sent at 1/25/2023 12:13 PM CST -----  Some changes seen which might be just due to arthritis but could be something else.    Would like to get a hip MRI.   I will order this.   Can you call and let her know?

## 2023-01-25 NOTE — PROGRESS NOTES
/   SUBJECTIVE:   CC: Nanette is an 52 year old who presents for preventive health visit.     She has a few concerns:  1- she stopped trazodone for sleep as it made her too groggy in the am.  Wondering if there is something else  2- she has right shoulder pain for a few months.   No injury.   Hurts reach back in particular  3- she also notes right elbow and forearm ache with gripping and twisting.    4- also right thumb pain in the thenar eminence for a few months.   No numbness of fingers or weakness  5- some giving out feeling and sharp brief pains in the right groin.   No injury.   One week duration.       Patient has been advised of split billing requirements and indicates understanding: Yes  Healthy Habits:     Getting at least 3 servings of Calcium per day:  Yes    Bi-annual eye exam:  NO    Dental care twice a year:  Yes    Sleep apnea or symptoms of sleep apnea:  Daytime drowsiness    Diet:  Regular (no restrictions)    Frequency of exercise:  4-5 days/week    Duration of exercise:  30-45 minutes    Taking medications regularly:  Yes    Medication side effects:  Not applicable    PHQ-2 Total Score: 2    Additional concerns today:  No              Today's PHQ-2 Score:   PHQ-2 ( 1999 [employer]) 1/23/2023   Q1: Little interest or pleasure in doing things 1   Q2: Feeling down, depressed or hopeless 1   PHQ-2 Score 2   PHQ-2 Total Score (12-17 Years)- Positive if 3 or more points; Administer PHQ-A if positive -   Q1: Little interest or pleasure in doing things Several days   Q2: Feeling down, depressed or hopeless Several days   PHQ-2 Score 2           Social History     Tobacco Use     Smoking status: Never     Smokeless tobacco: Never     Tobacco comments:     occasionally in the past - social   Substance Use Topics     Alcohol use: Yes     Alcohol/week: 0.0 - 1.0 standard drinks     If you drink alcohol do you typically have >3 drinks per day or >7 drinks per week? No    Alcohol Use 1/23/2023   Prescreen: >3  drinks/day or >7 drinks/week? No   Prescreen: >3 drinks/day or >7 drinks/week? -       Reviewed orders with patient.  Reviewed health maintenance and updated orders accordingly - Yes  Labs reviewed in EPIC    Breast Cancer Screening:    FHS-7:   Breast CA Risk Assessment (FHS-7) 12/8/2021 1/21/2022 5/12/2022 1/20/2023 1/23/2023   Did any of your first-degree relatives have breast or ovarian cancer? No Yes Yes Yes Yes   Did any of your relatives have bilateral breast cancer? Unknown No No No No   Did any man in your family have breast cancer? No No No No No   Did any woman in your family have breast and ovarian cancer? No No Yes Yes Yes   Did any woman in your family have breast cancer before age 50 y? No No No No No   Do you have 2 or more relatives with breast and/or ovarian cancer? No No No No No   Do you have 2 or more relatives with breast and/or bowel cancer? No No No No No       Mammogram Screening: Recommended annual mammography  Pertinent mammograms are reviewed under the imaging tab.    History of abnormal Pap smear: NO - age 30-65 PAP every 5 years with negative HPV co-testing recommended  PAP / HPV Latest Ref Rng & Units 12/8/2021 8/2/2018 10/7/2016   PAP   Negative for Intraepithelial Lesion or Malignancy (NILM) - -   PAP (Historical) - - NIL NIL   HPV16 Negative Negative Negative Negative   HPV18 Negative Negative Negative Negative   HRHPV Negative Negative Negative Negative     Reviewed and updated as needed this visit by clinical staff   Tobacco  Allergies  Meds              Reviewed and updated as needed this visit by Provider                 Past Medical History:   Diagnosis Date     Allergy, unspecified not elsewhere classified     Hayfever, better in the early 2000's     Anxiety state, unspecified      Dysplastic nevus     near toe. another suspicious mole right thigh. sees Derm - both removed     Genital herpes, unspecified     about 2 - 4 flares per year     Hypertension      Infection due to  2019 novel coronavirus 2021     Insomnia, unspecified      Migraine, unspecified, without mention of intractable migraine without mention of status migrainosus     will have some with aura; has others       Past Surgical History:   Procedure Laterality Date     ABDOMEN SURGERY       ARTHROSCOPY KNEE Right 2016    lateral and medial meniscal tear     C/SECTION, LOW TRANSVERSE  2005    , Low Transverse     C/SECTION, LOW TRANSVERSE  2008     COLONOSCOPY      repeat in 10 years     HERNIORRHAPHY INGUINAL  2012    Procedure: HERNIORRHAPHY INGUINAL;;  Surgeon: Tray Valenzuela MD;  Location: [facility] OR     LAPAROSCOPIC TUBAL LIGATION  2012    Procedure: LAPAROSCOPIC TUBAL LIGATION;  LAPAROSCOPIC TUBAL LIGATION, Right Inguinal Hernia Repair with Mesh ;  Surgeon: Gerson Burrell MD;  Location:  OR     [facility] NONSPECIFIC PROCEDURE  age 6    s/p tonsillectomy & adenoidectomy     [facility] NONSPECIFIC PROCEDURE  1994    L knee surgery ACL-arthroscopic/open     [facility] NONSPECIFIC PROCEDURE  1995    vein stripping x 2     [facility] NONSPECIFIC PROCEDURE  1997    R ACL surgery     [facility] NONSPECIFIC PROCEDURE  1995    s/p L inguinal hernia repair     [facility] NONSPECIFIC PROCEDURE  1998    abnormal pap-TCA     [facility] NONSPECIFIC PROCEDURE  2002    bunion surgery L     [facility] NONSPECIFIC PROCEDURE  2001    Bilateral saphenous vein       MEDICATIONS:  Current Outpatient Medications   Medication     buPROPion (WELLBUTRIN SR) 150 MG 12 hr tablet     escitalopram (LEXAPRO) 10 MG tablet     hydrOXYzine (ATARAX) 25 MG tablet     lisinopril (ZESTRIL) 20 MG tablet     SUMAtriptan (IMITREX) 50 MG tablet     UNABLE TO FIND     valACYclovir (VALTREX) 500 MG tablet     fexofenadine (ALLEGRA) 180 MG tablet     No current facility-administered medications for this visit.       SOCIAL HISTORY:  Social History     Tobacco Use     Smoking status: Never     Smokeless tobacco: Never      "Tobacco comments:     occasionally in the past - social   Substance Use Topics     Alcohol use: Yes     Alcohol/week: 0.0 - 1.0 standard drinks       Family History   Problem Relation Age of Onset     Cancer Other         Prostate CA     Hypertension Father      Neurologic Disorder Father         ALS     Thyroid Disease Mother         actually hypoglycemia; not thyroid.     Hypertension Mother      Breast Cancer Mother         Was taken care of with radiation     Hypertension Maternal Grandmother      Cerebrovascular Disease Maternal Grandmother      Cancer Maternal Grandmother         ovarian/uterine dx at age 50's     Breast Cancer Paternal Grandmother         dx at age 60's           Review of Systems   Constitutional: Negative for chills and fever.   HENT: Negative for congestion, ear pain, hearing loss and sore throat.    Eyes: Positive for visual disturbance. Negative for pain.   Respiratory: Negative for cough and shortness of breath.    Cardiovascular: Positive for peripheral edema. Negative for chest pain and palpitations.   Gastrointestinal: Negative for abdominal pain, constipation, diarrhea, heartburn, hematochezia and nausea.   Breasts:  Negative for tenderness, breast mass and discharge.   Genitourinary: Negative for dysuria, frequency, genital sores, hematuria, pelvic pain, urgency, vaginal bleeding and vaginal discharge.   Musculoskeletal: Positive for arthralgias and myalgias. Negative for joint swelling.   Skin: Negative for rash.   Neurological: Positive for headaches and paresthesias. Negative for dizziness and weakness.   Psychiatric/Behavioral: Positive for mood changes. The patient is nervous/anxious.           OBJECTIVE:   /82 (BP Location: Right arm, Patient Position: Sitting, Cuff Size: Adult Large)   Pulse 78   Temp 98.3  F (36.8  C) (Oral)   Resp 13   Ht 1.753 m (5' 9\")   Wt 91.4 kg (201 lb 6.4 oz)   SpO2 99%   BMI 29.74 kg/m    Physical Exam  GENERAL APPEARANCE: healthy, " alert and no distress  EYES: Eyes grossly normal to inspection, PERRL and conjunctivae and sclerae normal  HENT: ear canals and TM's normal, nose and mouth without ulcers or lesions, oropharynx clear and oral mucous membranes moist  NECK: no adenopathy, no asymmetry, masses, or scars and thyroid normal to palpation  RESP: lungs clear to auscultation - no rales, rhonchi or wheezes  BREAST: normal without masses, tenderness or nipple discharge and no palpable axillary masses or adenopathy  CV: regular rate and rhythm, normal S1 S2, no S3 or S4, no murmur, click or rub, no peripheral edema and peripheral pulses strong  ABDOMEN: soft, nontender, no hepatosplenomegaly, no masses and bowel sounds normal  MS: no musculoskeletal defects are noted, gait is age appropriate without ataxia, right elbow with some tenderness along forearm and discomfort with resisted pronation  SKIN: no suspicious lesions or rashes  NEURO: Normal strength and tone, sensory exam grossly normal, mentation intact and speech normal  PSYCH: mentation appears normal and affect normal/bright    Diagnostic Test Results:  Labs reviewed in Epic    ASSESSMENT/PLAN:   (M25.511) Right shoulder pain, unspecified chronicity  (primary encounter diagnosis)  Comment:   Plan: Orthopedic  Referral, OFFICE/OUTPT         VISIT,CORBY ESCOBEDO III            (F41.1) Anxiety state  Comment: stable  Plan: escitalopram (LEXAPRO) 10 MG tablet, buPROPion         (WELLBUTRIN SR) 150 MG 12 hr tablet        Add wellbutrin to see if it will help with sleep     (I10) Hypertension goal BP (blood pressure) < 140/90  Comment: under good control   Plan: INFLUENZA VACCINE 50-64 OR 18-64 W/EGG ALLERGY         (FLUBLOK), REVIEW OF HEALTH MAINTENANCE         PROTOCOL ORDERS, lisinopril (ZESTRIL) 20 MG         tablet, Basic metabolic panel  (Ca, Cl, CO2,         Creat, Gluc, K, Na, BUN), Albumin Random Urine         Quantitative with Creat Ratio, CBC with         platelets         "Continue  Refills per [email or web address]     (M77.11) Lateral epicondylitis of right elbow  Comment: Handout on the above diagnosis was given to the patient and discussed   Plan: Orthopedic  Referral, OFFICE/OUTPT         VISIT,EST,LEVL III        Showed exercises     (Z00.00) Routine history and physical examination of adult  Comment:   Plan: CBC with platelets            (M25.551) Hip pain, right  Comment: will get xray to start  Plan: XR Hip Right 2-3 Views, OFFICE/OUTPT         VISIT,EST,LEVL III              Patient has been advised of split billing requirements and indicates understanding: Yes      COUNSELING:  Reviewed preventive health counseling, as reflected in patient instructions  Special attention given to:        Regular exercise       Immunizations    Vaccinated for: Influenza             Colorectal Cancer Screening      BMI:   Estimated body mass index is 29.74 kg/m  as calculated from the following:    Height as of this encounter: 1.753 m (5' 9\").    Weight as of this encounter: 91.4 kg (201 lb 6.4 oz).   Weight management plan: Discussed healthy diet and exercise guidelines      She reports that she has never smoked. She has never used smokeless tobacco.      Claudia Erickson MD  [facility]  "

## 2023-01-25 NOTE — TELEPHONE ENCOUNTER
Called patient and advised of below.  Gave radiology scheduling #.  Patient agrees with plan.  Carly Daniel RN

## 2023-01-26 ENCOUNTER — HOSPITAL ENCOUNTER (OUTPATIENT)
Dept: MRI IMAGING | Facility: CLINIC | Age: 53
Discharge: HOME | End: 2023-01-26
Attending: FAMILY MEDICINE | Admitting: FAMILY MEDICINE
Payer: COMMERCIAL

## 2023-01-26 DIAGNOSIS — M25.551 HIP PAIN, RIGHT: ICD-10-CM

## 2023-01-26 PROCEDURE — 73721 MRI JNT OF LWR EXTRE W/O DYE: CPT | Mod: RT

## 2023-02-03 NOTE — PROGRESS NOTES
ASSESSMENT & PLAN  Patient Instructions     1. Impingement syndrome of right shoulder    2. Right shoulder pain, unspecified chronicity    3. Lateral epicondylitis of right elbow      -Patient has right shoulder pain due to bursitis and mild right elbow pain due to tendinitis  -Patient will start formal physical therapy and home exercise program for the right shoulder  -Patient will start home exercise program for the right elbow.  Handouts were given today  -Patient may continue with Advil as needed  -Patient may continue to work out with modifications as discussed today.  Patient continues to have pain with the chest fly machine, will decrease weight and lower arms.  If she continues to have pain, will stop performing this exercise for a few weeks and then restart  -Patient will follow up if pain does not improve  -Call direct clinic number [[phone]] at any time with questions or concerns.    Albert Yeo MD [facility]  [facility]          -----    SUBJECTIVE  Nanette Burnett is a/an 52 year old Right handed female who is seen in consultation at the request of  Claudia Erickson M.D. for evaluation of right shoulder and right elbow pain. The patient is seen by themselves.    Onset: 2 month(s) ago. Reports insidious onset without acute precipitating event.  Location of Pain: right anterior shoulder, also right lateral elbow radiating down lateral forearm   Rating of Pain at worst: 5/10  Rating of Pain Currently: 1/10  Worsened by: driving for extended periods of time, reaching behind her, chest fly, pain worse after exercise   Better with: Advil   Treatments tried: Advil   Associated symptoms: no distal numbness or tingling; denies swelling or warmth  Orthopedic history: NO  Relevant surgical history: NO  Social history: social history: works in [employer]     Past Medical History:   Diagnosis Date     Allergy, unspecified not elsewhere classified      Hayfever, better in the early 2000's     Anxiety state, unspecified      Dysplastic nevus     near toe. another suspicious mole right thigh. sees Derm - both removed     Genital herpes, unspecified     about 2 - 4 flares per year     Hypertension      Infection due to 2019 novel coronavirus 11/2021     Insomnia, unspecified      Migraine, unspecified, without mention of intractable migraine without mention of status migrainosus     will have some with aura; has others     Social History     Socioeconomic History     Marital status:      Spouse name: Brooks     Number of children: 1   Occupational History     Occupation:  firm     Comment: part time     Employer: [employer]   Tobacco Use     Smoking status: Never     Smokeless tobacco: Never     Tobacco comments:     occasionally in the past - social   Vaping Use     Vaping Use: Never used   Substance and Sexual Activity     Alcohol use: Yes     Alcohol/week: 0.0 - 1.0 standard drinks     Drug use: No     Sexual activity: Yes     Partners: Male     Birth control/protection: None     Comment: condom; TL   Other Topics Concern     Special Diet No     Comment: Watches some.  Avoids fast food.  OK with dairy.     Exercise Yes     Comment: treadmill in cold weather. tiw     Parent/sibling w/ CABG, MI or angioplasty before 65F 55M? No     Social Determinants of Health     Financial Resource Strain: Low Risk      Difficulty of Paying Living Expenses: Not hard at all   Food Insecurity: No Food Insecurity     Worried About Running Out of Food in the Last Year: Never true     Ran Out of Food in the Last Year: Never true   Transportation Needs: No Transportation Needs     Lack of Transportation (Medical): No     Lack of Transportation (Non-Medical): No   Physical Activity: Sufficiently Active     Days of Exercise per Week: 4 days     Minutes of Exercise per Session: 40 min   Stress: Stress Concern Present     Feeling of Stress : Very much   Social  "Connections: Moderately Integrated     Frequency of Communication with Friends and Family: Twice a week     Frequency of Social Gatherings with Friends and Family: Twice a week     Attends Yarsani Services: More than 4 times per year     Active Member of Clubs or Organizations: No     Marital Status:    Housing Stability: Low Risk      Unable to Pay for Housing in the Last Year: No     Number of Places Lived in the Last Year: 1     Unstable Housing in the Last Year: No         Patient's past medical, surgical, social, and family histories were reviewed today and no changes are noted.    REVIEW OF SYSTEMS:  10 point ROS is negative other than symptoms noted above in HPI, Past Medical History or as stated below  Constitutional: NEGATIVE for fever, chills, change in weight  Skin: NEGATIVE for worrisome rashes, moles or lesions  GI/: NEGATIVE for bowel or bladder changes  Neuro: NEGATIVE for weakness, dizziness or paresthesias    OBJECTIVE:  /81   Ht 1.753 m (5' 9\")   Wt 91.2 kg (201 lb)   BMI 29.68 kg/m     General: healthy, alert and in no distress  HEENT: no scleral icterus or conjunctival erythema  Skin: no suspicious lesions or rash. No jaundice.  CV: regular rhythm by palpation  Resp: normal respiratory effort without conversational dyspnea   Psych: normal mood and affect  Gait: normal steady gait with appropriate coordination and balance  Neuro: normal light touch sensory exam of the bilateral upper extremities.    MSK:  RIGHT SHOULDER  Inspection:    no swelling, bruising, discoloration, or obvious deformity or asymmetry  Palpation:    Tender about the supraspinatus insertion and infraspinatus insertion. Remainder of bony and tendinous landmarks are nontender.  Active Range of Motion:     Abduction normal0, FF normal0, ER normal0, IR normal.      Scapular dyskinesis absent  Strength:    Scapular plane abduction grossly intact,  ER grossly intact, IR grossly intact, biceps grossly intact, " triceps grossly intact  Special Tests:    Positive: Neer's and Linder'    Negative: supraspinatus (empty can), drop arm/painful arc, crossed arm adduction, [place]'s, Speed's and Yergason's        Independent visualization of the below image:  No results found for this or any previous visit (from the past 24 hour(s)).        Albert Yeo MD [facility]

## 2023-02-07 ENCOUNTER — OFFICE VISIT (OUTPATIENT)
Dept: ORTHOPEDICS | Facility: CLINIC | Age: 53
End: 2023-02-07
Payer: COMMERCIAL

## 2023-02-07 VITALS
WEIGHT: 201 LBS | BODY MASS INDEX: 29.77 KG/M2 | SYSTOLIC BLOOD PRESSURE: 110 MMHG | HEIGHT: 69 IN | DIASTOLIC BLOOD PRESSURE: 81 MMHG

## 2023-02-07 DIAGNOSIS — M77.11 LATERAL EPICONDYLITIS OF RIGHT ELBOW: ICD-10-CM

## 2023-02-07 DIAGNOSIS — M25.511 RIGHT SHOULDER PAIN, UNSPECIFIED CHRONICITY: ICD-10-CM

## 2023-02-07 DIAGNOSIS — M75.41 IMPINGEMENT SYNDROME OF RIGHT SHOULDER: Primary | ICD-10-CM

## 2023-02-07 PROCEDURE — 99243 OFF/OP CNSLTJ NEW/EST LOW 30: CPT | Performed by: FAMILY MEDICINE

## 2023-02-07 NOTE — PATIENT INSTRUCTIONS
1. Impingement syndrome of right shoulder    2. Right shoulder pain, unspecified chronicity    3. Lateral epicondylitis of right elbow      -Patient has right shoulder pain due to bursitis and mild right elbow pain due to tendinitis  -Patient will start formal physical therapy and home exercise program for the right shoulder  -Patient will start home exercise program for the right elbow.  Handouts were given today  -Patient may continue with Advil as needed  -Patient may continue to work out with modifications as discussed today.  Patient continues to have pain with the chest fly machine, will decrease weight and lower arms.  If she continues to have pain, will stop performing this exercise for a few weeks and then restart  -Patient will follow up if pain does not improve  -Call direct clinic number [[phone]] at any time with questions or concerns.    Albert Yeo MD [facility]  [facility]

## 2023-02-20 ENCOUNTER — THERAPY VISIT (OUTPATIENT)
Dept: PHYSICAL THERAPY | Facility: CLINIC | Age: 53
End: 2023-02-20
Attending: FAMILY MEDICINE
Payer: COMMERCIAL

## 2023-02-20 DIAGNOSIS — G89.29 CHRONIC RIGHT SHOULDER PAIN: ICD-10-CM

## 2023-02-20 DIAGNOSIS — M75.41 IMPINGEMENT SYNDROME OF RIGHT SHOULDER: ICD-10-CM

## 2023-02-20 DIAGNOSIS — M25.511 CHRONIC RIGHT SHOULDER PAIN: ICD-10-CM

## 2023-02-20 PROCEDURE — 97110 THERAPEUTIC EXERCISES: CPT | Mod: GP | Performed by: PHYSICAL THERAPIST

## 2023-02-20 PROCEDURE — 97161 PT EVAL LOW COMPLEX 20 MIN: CPT | Mod: GP | Performed by: PHYSICAL THERAPIST

## 2023-02-20 PROCEDURE — 97112 NEUROMUSCULAR REEDUCATION: CPT | Mod: GP | Performed by: PHYSICAL THERAPIST

## 2023-02-20 NOTE — PROGRESS NOTES
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 Pt reports that she's been having on and off pain in the right shoulder for a few months now worse with driving and certain positions. No known injury. Worse with reaching, chest flyes, lifting, and certain positions. Denies vague symptoms. Wants to get rid of this pain and improve overall right shoulder function. Wants to be able to keep up with exercises/weight training without issue.     Symptoms commenced as a result of: Chronic. Condition occurred in the following environment: unsure. Onset of symptoms: 3+ months. Location of symptoms: Anterior right shoulder. Pain level on number scale: 1/10. Quality of pain: aching, irritiable. Associated symptoms: none. Pain frequency (constant/intermittent): intermittent. Symptoms are exacerbated by: lifting, reaching, certain positons. Symptoms are relieved by: rest/avoidance. Progression of symptoms since onset: same. Imaging: none. Previous treatment: none. Response to previous treatment: none. General health as reported by patient is good. Pertinent medical history includes: See Epic. Medical allergies: see Epic. Other pertinent surgeries: see Epic. Current medications: See Epic. Occupation: Admin. Work/restriction status: none. Primary job tasks: sitting, computer work. Barriers at home/work: None  reported by patient. Red flags: None reported by patient.    Objective  Posture: forward head, rounded shoulders    Scapular positioning: mild/moderate winging right scap with eccentric flexion/abduction    *=painful    Shoulder AROM (* = pain) Right Left   *        180   * 180   ER/HBH 70 70   IR/HBB T7* T7     Shoulder PROM (* = pain) Right Left   * 180   * 180   ER In 90 abduction 80* In 90 abduction 90   IR In 90 abduction 65* In 90 abduction 70     Shoulder Strength (* = pain) Right Left   FL 5/5 5/5   ABD 5-/5 5/5   ER (0 abduction) 5-/5 5/5   ER (90 abduction) Unable to get in position/5 5/5   IR 5/5 5/5   Biceps 5/5 5/5   Middle Trapezius 4/5* 5/5   Lower Trapezius unable/5 5/5     Special Tests Right Left   Niyah-Angel + -   Neer + -   Painful Arc + -   Apprehension     External Rotation Lag      Sulcus sign - -     Palpation tenderness: unremarkable    Other Tests: none    Key Findings  Chronic right shoulder impingment  Assessment/Plan:    Patient is a 52 year old female with right side shoulder complaints.    Patient has the following significant findings with corresponding treatment plan.                Diagnosis 1:  Chronic right shoulder pain, impingement right shoulder  Pain -  manual therapy, self management, education and home program  Decreased ROM/flexibility - manual therapy and therapeutic exercise  Decreased joint mobility - manual therapy and therapeutic exercise  Decreased strength - therapeutic exercise and therapeutic activities  Impaired muscle performance - neuro re-education  Decreased function - therapeutic activities  Impaired posture - neuro re-education    Therapy Evaluation Codes:     1) History comprised of:   Personal factors that impact the plan of care:      Time since onset of symptoms.    Comorbidity factors that impact the plan of care are:      Migraines/headaches.     Medications impacting care: Anti-depressant.  2) Examination of Body Systems  comprised of:   Body structures and functions that impact the plan of care:      Shoulder.   Activity limitations that impact the plan of care are:      Bathing, Driving, Dressing, Lifting, Sports, Sleeping and Laying down.  3) Clinical presentation characteristics are:   Stable/Uncomplicated.  4) Decision-Making    Low complexity using standardized patient assessment instrument and/or measureable assessment of functional outcome.    Cumulative Therapy Evaluation is: Low complexity.    Previous and current functional limitations:  (See Goal Flow Sheet for this information)    Short term and Long term goals: (See Goal Flow Sheet for this information)     Communication ability:  Patient appears to be able to clearly communicate and understand verbal and written communication and follow directions correctly.  Treatment Explanation - The following has been discussed with the patient:   RX ordered/plan of care  Anticipated outcomes  Possible risks and side effects  This patient would benefit from PT intervention to resume normal activities.   Rehab potential is good.    Frequency:  1 X week, once daily  Duration:  for 10 weeks  Discharge Plan:  Achieve all LTG.  Independent in home treatment program.  Reach maximal therapeutic benefit.    Please refer to the daily flowsheet for treatment today, total treatment time and time spent performing 1:1 timed codes.     Inquires  Michael Daley PT, DPT, CSCS  Physical Therapist  [facility] [place] [id number] [facility], [facility] [address] [place] [address] [phone]

## 2023-02-27 DIAGNOSIS — F41.1 ANXIETY STATE: ICD-10-CM

## 2023-02-28 NOTE — TELEPHONE ENCOUNTER
Left message on machine to call back any triage nurse.  Did patient request refill of fluoxetine 20mg?    Current medication list reflects Lexapro 10mg daily.    Delicia White RN

## 2023-02-28 NOTE — TELEPHONE ENCOUNTER
WE HAVE that she is on lexapro.   Can you check with patient.  Perhaps pharmacy  Is wrong about this

## 2023-02-28 NOTE — TELEPHONE ENCOUNTER
Patient states she did not request refill of fluoxetine.  Request from pharmacy cancelled.  Delicia White RN

## 2023-02-28 NOTE — TELEPHONE ENCOUNTER
Routing refill request to provider for review/approval because:  Drug not active on patient's medication list    Diane POSEY RN   [facility]

## 2023-03-28 PROBLEM — G89.29 CHRONIC RIGHT SHOULDER PAIN: Status: RESOLVED | Noted: 2023-02-20 | Resolved: 2023-03-28

## 2023-03-28 PROBLEM — M25.511 CHRONIC RIGHT SHOULDER PAIN: Status: RESOLVED | Noted: 2023-02-20 | Resolved: 2023-03-28

## 2023-05-15 DIAGNOSIS — F41.1 ANXIETY STATE: ICD-10-CM

## 2023-05-17 RX ORDER — BUPROPION HYDROCHLORIDE 150 MG/1
TABLET, EXTENDED RELEASE ORAL
Qty: 30 TABLET | Refills: 1 | Status: SHIPPED | OUTPATIENT
Start: 2023-05-17 | End: 2023-08-02

## 2023-05-17 NOTE — TELEPHONE ENCOUNTER
Prescription approved per [facility] Refill Protocol.    Jennifer PETERS RN, BSN, PHN  [facility]  [phone]

## 2023-07-30 DIAGNOSIS — F41.1 ANXIETY STATE: ICD-10-CM

## 2023-08-02 RX ORDER — BUPROPION HYDROCHLORIDE 150 MG/1
TABLET, EXTENDED RELEASE ORAL
Qty: 90 TABLET | Refills: 1 | Status: SHIPPED | OUTPATIENT
Start: 2023-08-02 | End: 2024-01-15

## 2023-08-02 NOTE — TELEPHONE ENCOUNTER
Prescription approved per [facility] Refill Protocol.  Celso JIMENEZ RN 8/2/2023 at 8:29 AM

## 2023-12-14 ENCOUNTER — MYC REFILL (OUTPATIENT)
Dept: FAMILY MEDICINE | Facility: CLINIC | Age: 53
End: 2023-12-14
Payer: COMMERCIAL

## 2023-12-14 DIAGNOSIS — F41.1 ANXIETY STATE: ICD-10-CM

## 2023-12-14 DIAGNOSIS — G43.109 MIGRAINE WITH AURA AND WITHOUT STATUS MIGRAINOSUS, NOT INTRACTABLE: ICD-10-CM

## 2023-12-14 DIAGNOSIS — I10 HYPERTENSION GOAL BP (BLOOD PRESSURE) < 140/90: ICD-10-CM

## 2023-12-14 DIAGNOSIS — B00.9 HERPES SIMPLEX VIRUS (HSV) INFECTION: ICD-10-CM

## 2023-12-14 RX ORDER — BUPROPION HYDROCHLORIDE 150 MG/1
150 TABLET, EXTENDED RELEASE ORAL AT BEDTIME
Qty: 90 TABLET | Refills: 1 | OUTPATIENT
Start: 2023-12-14

## 2023-12-14 RX ORDER — LISINOPRIL 20 MG/1
20 TABLET ORAL DAILY
Qty: 90 TABLET | Refills: 3 | OUTPATIENT
Start: 2023-12-14

## 2023-12-14 RX ORDER — ESCITALOPRAM OXALATE 10 MG/1
10 TABLET ORAL DAILY
Qty: 90 TABLET | Refills: 3 | OUTPATIENT
Start: 2023-12-14

## 2023-12-14 RX ORDER — VALACYCLOVIR HYDROCHLORIDE 500 MG/1
TABLET, FILM COATED ORAL
Qty: 24 TABLET | Refills: 3 | Status: SHIPPED | OUTPATIENT
Start: 2023-12-14

## 2023-12-16 RX ORDER — SUMATRIPTAN 50 MG/1
TABLET, FILM COATED ORAL
Qty: 12 TABLET | Refills: 5 | Status: SHIPPED | OUTPATIENT
Start: 2023-12-16 | End: 2024-04-03

## 2023-12-21 ENCOUNTER — PATIENT OUTREACH (OUTPATIENT)
Dept: CARE COORDINATION | Facility: CLINIC | Age: 53
End: 2023-12-21
Payer: COMMERCIAL

## 2024-01-15 DIAGNOSIS — F41.1 ANXIETY STATE: ICD-10-CM

## 2024-01-15 RX ORDER — BUPROPION HYDROCHLORIDE 150 MG/1
TABLET, EXTENDED RELEASE ORAL
Qty: 90 TABLET | Refills: 0 | Status: SHIPPED | OUTPATIENT
Start: 2024-01-15 | End: 2024-04-03

## 2024-01-17 DIAGNOSIS — F41.1 ANXIETY STATE: ICD-10-CM

## 2024-01-18 ENCOUNTER — PATIENT OUTREACH (OUTPATIENT)
Dept: CARE COORDINATION | Facility: CLINIC | Age: 54
End: 2024-01-18
Payer: COMMERCIAL

## 2024-01-18 RX ORDER — ESCITALOPRAM OXALATE 10 MG/1
10 TABLET ORAL DAILY
Qty: 90 TABLET | Refills: 0 | Status: SHIPPED | OUTPATIENT
Start: 2024-01-18 | End: 2024-04-03

## 2024-02-20 DIAGNOSIS — I10 HYPERTENSION GOAL BP (BLOOD PRESSURE) < 140/90: ICD-10-CM

## 2024-02-21 RX ORDER — LISINOPRIL 20 MG/1
20 TABLET ORAL DAILY
Qty: 90 TABLET | Refills: 3 | Status: SHIPPED | OUTPATIENT
Start: 2024-02-21

## 2024-03-24 ENCOUNTER — HEALTH MAINTENANCE LETTER (OUTPATIENT)
Age: 54
End: 2024-03-24

## 2024-03-27 SDOH — HEALTH STABILITY: PHYSICAL HEALTH: ON AVERAGE, HOW MANY DAYS PER WEEK DO YOU ENGAGE IN MODERATE TO STRENUOUS EXERCISE (LIKE A BRISK WALK)?: 5 DAYS

## 2024-03-27 SDOH — HEALTH STABILITY: PHYSICAL HEALTH: ON AVERAGE, HOW MANY MINUTES DO YOU ENGAGE IN EXERCISE AT THIS LEVEL?: 30 MIN

## 2024-03-27 ASSESSMENT — LIFESTYLE VARIABLES
HOW OFTEN DO YOU HAVE A DRINK CONTAINING ALCOHOL: 2-4 TIMES A MONTH
AUDIT-C TOTAL SCORE: 2
HOW MANY STANDARD DRINKS CONTAINING ALCOHOL DO YOU HAVE ON A TYPICAL DAY: 1 OR 2
HOW OFTEN DO YOU HAVE SIX OR MORE DRINKS ON ONE OCCASION: NEVER
SKIP TO QUESTIONS 9-10: 1

## 2024-03-27 ASSESSMENT — SOCIAL DETERMINANTS OF HEALTH (SDOH)
DO YOU BELONG TO ANY CLUBS OR ORGANIZATIONS SUCH AS CHURCH GROUPS UNIONS, FRATERNAL OR ATHLETIC GROUPS, OR SCHOOL GROUPS?: NO
HOW OFTEN DO YOU GET TOGETHER WITH FRIENDS OR RELATIVES?: TWICE A WEEK
HOW OFTEN DO YOU ATTEND CHURCH OR RELIGIOUS SERVICES?: 1 TO 4 TIMES PER YEAR
IN A TYPICAL WEEK, HOW MANY TIMES DO YOU TALK ON THE PHONE WITH FAMILY, FRIENDS, OR NEIGHBORS?: TWICE A WEEK
HOW OFTEN DO YOU GET TOGETHER WITH FRIENDS OR RELATIVES?: TWICE A WEEK
HOW OFTEN DO YOU ATTENT MEETINGS OF THE CLUB OR ORGANIZATION YOU BELONG TO?: PATIENT DECLINED

## 2024-04-03 ENCOUNTER — ANCILLARY PROCEDURE (OUTPATIENT)
Dept: MAMMOGRAPHY | Facility: CLINIC | Age: 54
End: 2024-04-03
Payer: COMMERCIAL

## 2024-04-03 ENCOUNTER — OFFICE VISIT (OUTPATIENT)
Dept: FAMILY MEDICINE | Facility: CLINIC | Age: 54
End: 2024-04-03
Payer: COMMERCIAL

## 2024-04-03 VITALS
TEMPERATURE: 98.1 F | DIASTOLIC BLOOD PRESSURE: 92 MMHG | HEART RATE: 73 BPM | HEIGHT: 69 IN | WEIGHT: 182 LBS | BODY MASS INDEX: 26.96 KG/M2 | OXYGEN SATURATION: 99 % | SYSTOLIC BLOOD PRESSURE: 138 MMHG | RESPIRATION RATE: 12 BRPM

## 2024-04-03 DIAGNOSIS — R03.0 ELEVATED BLOOD PRESSURE READING WITHOUT DIAGNOSIS OF HYPERTENSION: ICD-10-CM

## 2024-04-03 DIAGNOSIS — F41.1 ANXIETY STATE: ICD-10-CM

## 2024-04-03 DIAGNOSIS — F51.01 PRIMARY INSOMNIA: ICD-10-CM

## 2024-04-03 DIAGNOSIS — G43.109 MIGRAINE WITH AURA AND WITHOUT STATUS MIGRAINOSUS, NOT INTRACTABLE: ICD-10-CM

## 2024-04-03 DIAGNOSIS — M79.2 NERVE PAIN: ICD-10-CM

## 2024-04-03 DIAGNOSIS — Z12.31 VISIT FOR SCREENING MAMMOGRAM: ICD-10-CM

## 2024-04-03 DIAGNOSIS — Z00.00 ROUTINE GENERAL MEDICAL EXAMINATION AT A HEALTH CARE FACILITY: Primary | ICD-10-CM

## 2024-04-03 LAB
ALBUMIN UR-MCNC: NEGATIVE MG/DL
ANION GAP SERPL CALCULATED.3IONS-SCNC: 9 MMOL/L (ref 7–15)
APPEARANCE UR: CLEAR
BILIRUB UR QL STRIP: NEGATIVE
BUN SERPL-MCNC: 11.1 MG/DL (ref 6–20)
CALCIUM SERPL-MCNC: 9.2 MG/DL (ref 8.6–10)
CHLORIDE SERPL-SCNC: 104 MMOL/L (ref 98–107)
COLOR UR AUTO: YELLOW
CREAT SERPL-MCNC: 0.75 MG/DL (ref 0.51–0.95)
DEPRECATED HCO3 PLAS-SCNC: 28 MMOL/L (ref 22–29)
EGFRCR SERPLBLD CKD-EPI 2021: >90 ML/MIN/1.73M2
GLUCOSE SERPL-MCNC: 83 MG/DL (ref 70–99)
GLUCOSE UR STRIP-MCNC: NEGATIVE MG/DL
HGB UR QL STRIP: NEGATIVE
KETONES UR STRIP-MCNC: NEGATIVE MG/DL
LEUKOCYTE ESTERASE UR QL STRIP: NEGATIVE
NITRATE UR QL: NEGATIVE
PH UR STRIP: 7 [PH] (ref 5–7)
POTASSIUM SERPL-SCNC: 4 MMOL/L (ref 3.4–5.3)
SODIUM SERPL-SCNC: 141 MMOL/L (ref 135–145)
SP GR UR STRIP: 1.01 (ref 1–1.03)
TSH SERPL DL<=0.005 MIU/L-ACNC: 1.77 UIU/ML (ref 0.3–4.2)
UROBILINOGEN UR STRIP-ACNC: 0.2 E.U./DL

## 2024-04-03 PROCEDURE — 84443 ASSAY THYROID STIM HORMONE: CPT | Performed by: FAMILY MEDICINE

## 2024-04-03 PROCEDURE — 36415 COLL VENOUS BLD VENIPUNCTURE: CPT | Performed by: FAMILY MEDICINE

## 2024-04-03 PROCEDURE — 99396 PREV VISIT EST AGE 40-64: CPT | Performed by: FAMILY MEDICINE

## 2024-04-03 PROCEDURE — 87624 HPV HI-RISK TYP POOLED RSLT: CPT | Performed by: FAMILY MEDICINE

## 2024-04-03 PROCEDURE — 81003 URINALYSIS AUTO W/O SCOPE: CPT | Performed by: FAMILY MEDICINE

## 2024-04-03 PROCEDURE — 80048 BASIC METABOLIC PNL TOTAL CA: CPT | Performed by: FAMILY MEDICINE

## 2024-04-03 PROCEDURE — 77063 BREAST TOMOSYNTHESIS BI: CPT | Mod: TC | Performed by: STUDENT IN AN ORGANIZED HEALTH CARE EDUCATION/TRAINING PROGRAM

## 2024-04-03 PROCEDURE — G0145 SCR C/V CYTO,THINLAYER,RESCR: HCPCS | Performed by: FAMILY MEDICINE

## 2024-04-03 PROCEDURE — 99214 OFFICE O/P EST MOD 30 MIN: CPT | Mod: 25 | Performed by: FAMILY MEDICINE

## 2024-04-03 PROCEDURE — 77067 SCR MAMMO BI INCL CAD: CPT | Mod: TC | Performed by: STUDENT IN AN ORGANIZED HEALTH CARE EDUCATION/TRAINING PROGRAM

## 2024-04-03 RX ORDER — GABAPENTIN 100 MG/1
CAPSULE ORAL
Qty: 90 CAPSULE | Refills: 2 | Status: SHIPPED | OUTPATIENT
Start: 2024-04-03

## 2024-04-03 RX ORDER — ESCITALOPRAM OXALATE 10 MG/1
10 TABLET ORAL DAILY
Qty: 90 TABLET | Refills: 0 | OUTPATIENT
Start: 2024-04-03

## 2024-04-03 RX ORDER — ESCITALOPRAM OXALATE 10 MG/1
15 TABLET ORAL DAILY
Qty: 135 TABLET | Refills: 2 | Status: SHIPPED | OUTPATIENT
Start: 2024-04-03

## 2024-04-03 RX ORDER — RIZATRIPTAN BENZOATE 10 MG/1
10 TABLET, ORALLY DISINTEGRATING ORAL
Qty: 12 TABLET | Refills: 3 | Status: SHIPPED | OUTPATIENT
Start: 2024-04-03

## 2024-04-03 RX ORDER — SUMATRIPTAN 50 MG/1
TABLET, FILM COATED ORAL
Qty: 12 TABLET | Refills: 5 | Status: SHIPPED | OUTPATIENT
Start: 2024-04-03

## 2024-04-03 RX ORDER — HYDROXYZINE HYDROCHLORIDE 25 MG/1
TABLET, FILM COATED ORAL
Qty: 30 TABLET | Refills: 11 | Status: SHIPPED | OUTPATIENT
Start: 2024-04-03

## 2024-04-03 RX ORDER — BUPROPION HYDROCHLORIDE 150 MG/1
150 TABLET, EXTENDED RELEASE ORAL AT BEDTIME
Qty: 90 TABLET | Refills: 4 | Status: SHIPPED | OUTPATIENT
Start: 2024-04-03

## 2024-04-03 ASSESSMENT — ANXIETY QUESTIONNAIRES
2. NOT BEING ABLE TO STOP OR CONTROL WORRYING: SEVERAL DAYS
4. TROUBLE RELAXING: SEVERAL DAYS
1. FEELING NERVOUS, ANXIOUS, OR ON EDGE: MORE THAN HALF THE DAYS
3. WORRYING TOO MUCH ABOUT DIFFERENT THINGS: SEVERAL DAYS
5. BEING SO RESTLESS THAT IT IS HARD TO SIT STILL: NOT AT ALL
GAD7 TOTAL SCORE: 7
7. FEELING AFRAID AS IF SOMETHING AWFUL MIGHT HAPPEN: SEVERAL DAYS
6. BECOMING EASILY ANNOYED OR IRRITABLE: SEVERAL DAYS
IF YOU CHECKED OFF ANY PROBLEMS ON THIS QUESTIONNAIRE, HOW DIFFICULT HAVE THESE PROBLEMS MADE IT FOR YOU TO DO YOUR WORK, TAKE CARE OF THINGS AT HOME, OR GET ALONG WITH OTHER PEOPLE: NOT DIFFICULT AT ALL
GAD7 TOTAL SCORE: 7

## 2024-04-03 NOTE — PATIENT INSTRUCTIONS
Preventive Care Advice   This is general advice given by our system to help you stay healthy. However, your care team may have specific advice just for you. Please talk to your care team about your preventive care needs.  Nutrition  Eat 5 or more servings of fruits and vegetables each day.  Try wheat bread, brown rice and whole grain pasta (instead of white bread, rice, and pasta).  Get enough calcium and vitamin D. Check the label on foods and aim for 100% of the RDA (recommended daily allowance).  Lifestyle  Exercise at least 150 minutes each week   (30 minutes a day, 5 days a week).  Do muscle strengthening activities 2 days a week. These help control your weight and prevent disease.  No smoking.  Wear sunscreen to prevent skin cancer.  Have a dental exam and cleaning every 6 months.  Yearly exams  See your health care team every year to talk about:  Any changes in your health.  Any medicines your care team has prescribed.  Preventive care, family planning, and ways to prevent chronic diseases.  Shots (vaccines)   HPV shots (up to age 26), if you've never had them before.  Hepatitis B shots (up to age 59), if you've never had them before.  COVID-19 shot: Get this shot when it's due.  Flu shot: Get a flu shot every year.  Tetanus shot: Get a tetanus shot every 10 years.  Pneumococcal, hepatitis A, and RSV shots: Ask your care team if you need these based on your risk.  Shingles shot (for age 50 and up).  General health tests  Diabetes screening:  Starting at age 35, Get screened for diabetes at least every 3 years.  If you are younger than age 35, ask your care team if you should be screened for diabetes.  Cholesterol test: At age 39, start having a cholesterol test every 5 years, or more often if advised.  Bone density scan (DEXA): At age 50, ask your care team if you should have this scan for osteoporosis (brittle bones).  Hepatitis C: Get tested at least once in your life.  STIs (sexually transmitted  infections)  Before age 24: Ask your care team if you should be screened for STIs.  After age 24: Get screened for STIs if you're at risk. You are at risk for STIs (including HIV) if:  You are sexually active with more than one person.  You don't use condoms every time.  You or a partner was diagnosed with a sexually transmitted infection.  If you are at risk for HIV, ask about PrEP medicine to prevent HIV.  Get tested for HIV at least once in your life, whether you are at risk for HIV or not.  Cancer screening tests  Cervical cancer screening: If you have a cervix, begin getting regular cervical cancer screening tests at age 21. Most people who have regular screenings with normal results can stop after age 65. Talk about this with your provider.  Breast cancer scan (mammogram): If you've ever had breasts, begin having regular mammograms starting at age 40. This is a scan to check for breast cancer.  Colon cancer screening: It is important to start screening for colon cancer at age 45.  Have a colonoscopy test every 10 years (or more often if you're at risk) Or, ask your provider about stool tests like a FIT test every year or Cologuard test every 3 years.  To learn more about your testing options, visit: https://www.[email or web address]/559823.pdf.  For help making a decision, visit: https://bit.ly/mm11789.  Prostate cancer screening test: If you have a prostate and are age 55 to 69, ask your provider if you would benefit from a yearly prostate cancer screening test.  Lung cancer screening: If you are a current or former smoker age 50 to 80, ask your care team if ongoing lung cancer screenings are right for you.  For informational purposes only. Not to replace the advice of your health care provider. Copyright   2023 [employer]. All rights reserved. Clinically reviewed by the [facility] Transitions Program. [employer] 423499 - REV 01/24.    Learning About Stress  What is stress?     Stress is your  body's response to a hard situation. Your body can have a physical, emotional, or mental response. Stress is a fact of life for most people, and it affects everyone differently. What causes stress for you may not be stressful for someone else.  A lot of things can cause stress. You may feel stress when you go on a job interview, take a test, or run a race. This kind of short-term stress is normal and even useful. It can help you if you need to work hard or react quickly. For example, stress can help you finish an important job on time.  Long-term stress is caused by ongoing stressful situations or events. Examples of long-term stress include long-term health problems, ongoing problems at work, or conflicts in your family. Long-term stress can harm your health.  How does stress affect your health?  When you are stressed, your body responds as though you are in danger. It makes hormones that speed up your heart, make you breathe faster, and give you a burst of energy. This is called the fight-or-flight stress response. If the stress is over quickly, your body goes back to normal and no harm is done.  But if stress happens too often or lasts too long, it can have bad effects. Long-term stress can make you more likely to get sick, and it can make symptoms of some diseases worse. If you tense up when you are stressed, you may develop neck, shoulder, or low back pain. Stress is linked to high blood pressure and heart disease.  Stress also harms your emotional health. It can make you simon, tense, or depressed. Your relationships may suffer, and you may not do well at work or school.  What can you do to manage stress?  You can try these things to help manage stress:   Do something active. Exercise or activity can help reduce stress. Walking is a great way to get started. Even everyday activities such as housecleaning or yard work can help.  Try yoga or timothy chi. These techniques combine exercise and meditation. You may need  some training at first to learn them.  Do something you enjoy. For example, listen to music or go to a movie. Practice your hobby or do volunteer work.  Meditate. This can help you relax, because you are not worrying about what happened before or what may happen in the future.  Do guided imagery. Imagine yourself in any setting that helps you feel calm. You can use online videos, books, or a teacher to guide you.  Do breathing exercises. For example:  From a standing position, bend forward from the waist with your knees slightly bent. Let your arms dangle close to the floor.  Breathe in slowly and deeply as you return to a standing position. Roll up slowly and lift your head last.  Hold your breath for just a few seconds in the standing position.  Breathe out slowly and bend forward from the waist.  Let your feelings out. Talk, laugh, cry, and express anger when you need to. Talking with supportive friends or family, a counselor, or a lisa leader about your feelings is a healthy way to relieve stress. Avoid discussing your feelings with people who make you feel worse.  Write. It may help to write about things that are bothering you. This helps you find out how much stress you feel and what is causing it. When you know this, you can find better ways to cope.  What can you do to prevent stress?  You might try some of these things to help prevent stress:  Manage your time. This helps you find time to do the things you want and need to do.  Get enough sleep. Your body recovers from the stresses of the day while you are sleeping.  Get support. Your family, friends, and community can make a difference in how you experience stress.  Limit your news feed. Avoid or limit time on social media or news that may make you feel stressed.  Do something active. Exercise or activity can help reduce stress. Walking is a great way to get started.  Where can you learn more?  Go to https://www.healthwise.net/patiented  Enter N032 in the  "search box to learn more about \"Learning About Stress.\"  Current as of: October 24, 2023               Content Version: 14.0    1828-5775 [employer].   Care instructions adapted under license by your healthcare professional. If you have questions about a medical condition or this instruction, always ask your healthcare professional. [employer] disclaims any warranty or liability for your use of this information.      "

## 2024-04-03 NOTE — PROGRESS NOTES
Preventive Care Visit  [facility]  Claudia Erickson MD, Family Medicine  Apr 3, 2024      Assessment & Plan     Routine general medical examination at a health care facility    - REVIEW OF HEALTH MAINTENANCE PROTOCOL ORDERS  - Pap screen with HPV - recommended age 30 - 65 years    Migraine with aura and without status migrainosus, not intractable  Tried a friends maxalt and it worked faster.   Is interested in this rx too but will not take together    - SUMAtriptan (IMITREX) 50 MG tablet  Dispense: 12 tablet; Refill: 5  - rizatriptan (MAXALT-MLT) 10 MG ODT  Dispense: 12 tablet; Refill: 3    Anxiety state  Would like to try a little higher dose of lexapro  Refills per [facility]  Recheck in 6 months for med check    - hydrOXYzine HCl (ATARAX) 25 MG tablet  Dispense: 30 tablet; Refill: 11  - escitalopram (LEXAPRO) 10 MG tablet  Dispense: 135 tablet; Refill: 2  - buPROPion (WELLBUTRIN SR) 150 MG 12 hr tablet  Dispense: 90 tablet; Refill: 4    Nerve pain  Start and work up to 300 mg per night  The potential side effects of the prescription medication were discussed with the patient.    - gabapentin (NEURONTIN) 100 MG capsule  Dispense: 90 capsule; Refill: 2    Primary insomnia    - gabapentin (NEURONTIN) 100 MG capsule  Dispense: 90 capsule; Refill: 2    Elevated blood pressure reading without diagnosis of hypertension  Recheck BP in 4 weeks with nurse only and if still high will start ARB    - Basic metabolic panel  (Ca, Cl, CO2, Creat, Gluc, K, Na, BUN)  - TSH with free T4 reflex  - UA Macroscopic with reflex to Microscopic and Culture - Lab Collect  - Basic metabolic panel  (Ca, Cl, CO2, Creat, Gluc, K, Na, BUN)  - TSH with free T4 reflex  - UA Macroscopic with reflex to Microscopic and Culture - Lab Collect      Patient has been advised of split billing requirements and indicates understanding: Yes    32 minutes spent by me on the date of the encounter doing chart review, history and exam,  "documentation and further activities per the note      BMI  Estimated body mass index is 26.88 kg/m  as calculated from the following:    Height as of this encounter: 1.753 m (5' 9\").    Weight as of this encounter: 82.6 kg (182 lb).       Counseling  Appropriate preventive services were discussed with this patient, including applicable screening as appropriate for fall prevention, nutrition, physical activity, Tobacco-use cessation, weight loss and cognition.  Checklist reviewing preventive services available has been given to the patient.  Reviewed patient's diet, addressing concerns and/or questions.       FUTURE APPOINTMENTS:       - Follow-up visit in 3-4 weeks for BP recheck and if BP still high start ARB    Work on weight loss  Regular exercise  See Patient Instructions    Devon Fitzpatrick is a 53 year old, presenting for the following:  Physical  She takes lexapro for anxiety and feels like she would like a little higher dose.   Her nerves are a little worse over the last few months.   Also she has a hard time sleeping.   She would like to try something.   She also is getting more migraines lately.   She is getting about 3-4 per week.   Her imitrex does work.   She tried a friends maxalt and it really helped fast.   She wonders about rx for that   she also is concerned about her BP today.          4/3/2024     9:22 AM   Additional Questions   Roomed by Aurora ROJAS        Via the Health Maintenance questionnaire, the patient has reported the following services have been completed -Mammogram, this information has been sent to the abstraction team.  Health Care Directive  Patient does not have a Health Care Directive or Living Will: Patient states has Advance Directive and will bring in a copy to clinic.    HPI    See above           3/27/2024   General Health   How would you rate your overall physical health? Good   Feel stress (tense, anxious, or unable to sleep) Very much    Very much   (!) STRESS CONCERN      " 3/27/2024   Nutrition   Three or more servings of calcium each day? (!) NO   Diet: Regular (no restrictions)   How many servings of fruit and vegetables per day? (!) 2-3   How many sweetened beverages each day? 0-1         3/27/2024   Exercise   Days per week of moderate/strenous exercise 5 days    5 days   Average minutes spent exercising at this level 30 min    30 min         3/27/2024   Social Factors   Frequency of gathering with friends or relatives Twice a week    Twice a week   Worry food won't last until get money to buy more No    No   Food not last or not have enough money for food? No    No   Do you have housing?  Yes    Yes   Are you worried about losing your housing? No    No   Lack of transportation? No    No   Unable to get utilities (heat,electricity)? No    No         3/27/2024   Fall Risk   Fallen 2 or more times in the past year? No   Trouble with walking or balance? No          3/27/2024   Dental   Dentist two times every year? Yes         3/27/2024   TB Screening   Were you born outside of the [place]? No         Today's PHQ-2 Score:       4/2/2024     9:55 AM   PHQ-2 ( 1999 [employer])   Q1: Little interest or pleasure in doing things 0   Q2: Feeling down, depressed or hopeless 0   PHQ-2 Score 0   Q1: Little interest or pleasure in doing things Not at all   Q2: Feeling down, depressed or hopeless Not at all   PHQ-2 Score 0           3/27/2024   Substance Use   Frequency of drinking alcohol? 2-4 times a month   Alcohol more than 3/day or more than 7/wk No   Do you use any other substances recreationally? No     Social History     Tobacco Use    Smoking status: Never    Smokeless tobacco: Never    Tobacco comments:     occasionally in the past - social   Vaping Use    Vaping Use: Never used   Substance Use Topics    Alcohol use: Yes     Alcohol/week: 0.0 - 1.0 standard drinks of alcohol    Drug use: No           1/23/2023   LAST FHS-7 RESULTS   1st degree relative breast or ovarian cancer Yes   Any  relative bilateral breast cancer No   Any male have breast cancer No   Any ONE woman have BOTH breast AND ovarian cancer Yes   Any woman with breast cancer before 50yrs No   2 or more relatives with breast AND/OR ovarian cancer No   2 or more relatives with breast AND/OR bowel cancer No        Mammogram Screening - Mammogram every 1-2 years updated in Health Maintenance based on mutual decision making        3/27/2024   STI Screening   New sexual partner(s) since last STI/HIV test? No     History of abnormal Pap smear: NO - age 30-65 PAP every 5 years with negative HPV co-testing recommended        Latest Ref Rng & Units 12/8/2021     8:51 AM 8/2/2018     3:57 PM 8/2/2018     3:11 PM   PAP / HPV   PAP  Negative for Intraepithelial Lesion or Malignancy (NILM)      PAP (Historical)   NIL     HPV 16 DNA Negative Negative   Negative    HPV 18 DNA Negative Negative   Negative    Other HR HPV Negative Negative   Negative      ASCVD Risk   The ASCVD Risk score (An BESS, et al., 2019) failed to calculate for the following reasons:    The systolic blood pressure is missing    The valid HDL cholesterol range is 20 to 100 mg/dL           Reviewed and updated as needed this visit by Provider                    Labs reviewed in EPIC  BP Readings from Last 3 Encounters:   04/03/24 (!) 138/92   02/07/23 110/81   01/25/23 117/82    Wt Readings from Last 3 Encounters:   04/03/24 82.6 kg (182 lb)   02/07/23 91.2 kg (201 lb)   01/25/23 91.4 kg (201 lb 6.4 oz)                  Patient Active Problem List   Diagnosis    Herpes simplex virus (HSV) infection    Anxiety state    Allergic state    External hemorrhoids    CARDIOVASCULAR SCREENING; LDL GOAL LESS THAN 160    Dysplasia of cervix    Migraine    Health Care Home    Anemia, unspecified type    Hypertension goal BP (blood pressure) < 140/90    Rosenthal's neuroma of left foot    Impingement syndrome of right shoulder     Past Surgical History:   Procedure Laterality Date     ABDOMEN SURGERY      ARTHROSCOPY KNEE Right 2016    lateral and medial meniscal tear    C/SECTION, LOW TRANSVERSE  2005    , Low Transverse    C/SECTION, LOW TRANSVERSE  2008    COLONOSCOPY      repeat in 10 years    HERNIORRHAPHY INGUINAL  2012    Procedure: HERNIORRHAPHY INGUINAL;;  Surgeon: Tray Valenzuela MD;  Location: [facility] OR    LAPAROSCOPIC TUBAL LIGATION  2012    Procedure: LAPAROSCOPIC TUBAL LIGATION;  LAPAROSCOPIC TUBAL LIGATION, Right Inguinal Hernia Repair with Mesh ;  Surgeon: Gerson Burrell MD;  Location: [facility] OR    [facility] NONSPECIFIC PROCEDURE  age 6    s/p tonsillectomy & adenoidectomy    [facility] NONSPECIFIC PROCEDURE  1994    L knee surgery ACL-arthroscopic/open    [facility] NONSPECIFIC PROCEDURE  1995    vein stripping x 2    [facility] NONSPECIFIC PROCEDURE  1997    R ACL surgery    [facility] NONSPECIFIC PROCEDURE  1995    s/p L inguinal hernia repair    [facility] NONSPECIFIC PROCEDURE  1998    abnormal pap-TCA    [facility] NONSPECIFIC PROCEDURE  2002    bunion surgery L    [facility] NONSPECIFIC PROCEDURE  2001    Bilateral saphenous vein       Social History     Tobacco Use    Smoking status: Never    Smokeless tobacco: Never    Tobacco comments:     occasionally in the past - social   Substance Use Topics    Alcohol use: Yes     Alcohol/week: 0.0 - 1.0 standard drinks of alcohol     Family History   Problem Relation Age of Onset    Cancer Other         Prostate CA    Hypertension Father     Neurologic Disorder Father         ALS    Thyroid Disease Mother         actually hypoglycemia; not thyroid.    Hypertension Mother     Breast Cancer Mother         Was taken care of with radiation    Hypertension Maternal Grandmother     Cerebrovascular Disease Maternal Grandmother     Cancer Maternal Grandmother         ovarian/uterine dx at age 50's    Breast Cancer Paternal Grandmother         dx at age 60's         Current Outpatient Medications  "  Medication Sig Dispense Refill    buPROPion (WELLBUTRIN SR) 150 MG 12 hr tablet Take 1 tablet (150 mg) by mouth at bedtime 90 tablet 4    escitalopram (LEXAPRO) 10 MG tablet Take 1.5 tablets (15 mg) by mouth daily 135 tablet 2    gabapentin (NEURONTIN) 100 MG capsule 1 TAB each night for 7 days, then 2 per night for 7 days and then 3 per night 90 capsule 2    hydrOXYzine HCl (ATARAX) 25 MG tablet TAKE 1 TABLET(25 MG) BY MOUTH EVERY NIGHT AS NEEDED FOR ANXIETY OR SLEEP 30 tablet 11    lisinopril (ZESTRIL) 20 MG tablet TAKE 1 TABLET(20 MG) BY MOUTH DAILY 90 tablet 3    rizatriptan (MAXALT-MLT) 10 MG ODT Take 1 tablet (10 mg) by mouth at onset of headache for migraine May repeat in 2 hours. Max 3 tablets/24 hours. 12 tablet 3    SUMAtriptan (IMITREX) 50 MG tablet TAKE 1 TABLET(50 MG) BY MOUTH AT ONSET OF HEADACHE FOR MIGRAINE. MAY REPEAT DOSE IN 2 HOURS. DO NOT EXCEED 200 MG IN 24 HOURS 12 tablet 5    valACYclovir (VALTREX) 500 MG tablet TAKE 1 TABLET BY MOUTH TWICE DAILY FOR 3 DAYS WITH FLARES 24 tablet 3         Review of Systems  Constitutional, HEENT, cardiovascular, pulmonary, gi and gu systems are negative, except as otherwise noted.     Objective    Exam  BP (!) 138/92 (BP Location: Left arm, Patient Position: Chair, Cuff Size: Adult Regular)   Pulse 73   Temp 98.1  F (36.7  C) (Oral)   Resp 12   Ht 1.753 m (5' 9\")   Wt 82.6 kg (182 lb)   SpO2 99%   BMI 26.88 kg/m     Estimated body mass index is 29.68 kg/m  as calculated from the following:    Height as of 2/7/23: 1.753 m (5' 9\").    Weight as of 2/7/23: 91.2 kg (201 lb).    Physical Exam  GENERAL: alert and no distress  EYES: Eyes grossly normal to inspection, PERRL and conjunctivae and sclerae normal  HENT: ear canals and TM's normal, nose and mouth without ulcers or lesions  NECK: no adenopathy, no asymmetry, masses, or scars  RESP: lungs clear to auscultation - no rales, rhonchi or wheezes  BREAST: normal without masses, tenderness or nipple " discharge and no palpable axillary masses or adenopathy  CV: regular rate and rhythm, normal S1 S2, no S3 or S4, no murmur, click or rub, no peripheral edema  ABDOMEN: soft, nontender, no hepatosplenomegaly, no masses and bowel sounds normal   (female) w/bimanual: normal female external genitalia, normal urethral meatus, normal vaginal mucosa, and normal cervix/adnexa/uterus without masses or discharge  MS: no gross musculoskeletal defects noted, no edema  SKIN: no suspicious lesions or rashes  NEURO: Normal strength and tone, mentation intact and speech normal  PSYCH: mentation appears normal, affect normal/bright  LYMPH: no cervical, supraclavicular, axillary, or inguinal adenopathy        Signed Electronically by: Claudia Erickson MD

## 2024-04-26 ENCOUNTER — ALLIED HEALTH/NURSE VISIT (OUTPATIENT)
Dept: FAMILY MEDICINE | Facility: CLINIC | Age: 54
End: 2024-04-26
Payer: COMMERCIAL

## 2024-04-26 VITALS — HEART RATE: 76 BPM | SYSTOLIC BLOOD PRESSURE: 136 MMHG | DIASTOLIC BLOOD PRESSURE: 84 MMHG | OXYGEN SATURATION: 98 %

## 2024-04-26 DIAGNOSIS — Z01.30 BP CHECK: Primary | ICD-10-CM

## 2024-04-26 PROCEDURE — 99207 PR NO CHARGE NURSE ONLY: CPT

## 2024-04-26 NOTE — PROGRESS NOTES
Nanette Burnett is a 53 year old patient who comes in today for a Blood Pressure check.  Initial BP:  /84 (BP Location: Right arm, Patient Position: Chair, Cuff Size: Adult Regular)   Pulse 76   SpO2 98%      Disposition: follow-up as previously indicated by provider and results routed to provider

## 2024-06-12 ENCOUNTER — TRANSFERRED RECORDS (OUTPATIENT)
Dept: HEALTH INFORMATION MANAGEMENT | Facility: CLINIC | Age: 54
End: 2024-06-12
Payer: COMMERCIAL

## 2024-06-18 ENCOUNTER — NURSE TRIAGE (OUTPATIENT)
Dept: FAMILY MEDICINE | Facility: CLINIC | Age: 54
End: 2024-06-18
Payer: COMMERCIAL

## 2024-06-18 NOTE — TELEPHONE ENCOUNTER
Patient calling and wondering if can just come and have bp checked.  Has been feeling off- tired, off balance and dizziness.  Scheduled tomorrow with Rupesh.  Carly Daniel RN

## 2024-06-19 ENCOUNTER — OFFICE VISIT (OUTPATIENT)
Dept: FAMILY MEDICINE | Facility: CLINIC | Age: 54
End: 2024-06-19
Payer: COMMERCIAL

## 2024-06-19 VITALS
RESPIRATION RATE: 16 BRPM | HEART RATE: 81 BPM | HEIGHT: 69 IN | TEMPERATURE: 98.1 F | BODY MASS INDEX: 26.56 KG/M2 | SYSTOLIC BLOOD PRESSURE: 127 MMHG | OXYGEN SATURATION: 98 % | DIASTOLIC BLOOD PRESSURE: 85 MMHG | WEIGHT: 179.3 LBS

## 2024-06-19 DIAGNOSIS — R00.2 PALPITATIONS: Primary | ICD-10-CM

## 2024-06-19 DIAGNOSIS — R42 LIGHTHEADEDNESS: ICD-10-CM

## 2024-06-19 PROCEDURE — G2211 COMPLEX E/M VISIT ADD ON: HCPCS | Performed by: PHYSICIAN ASSISTANT

## 2024-06-19 PROCEDURE — 99213 OFFICE O/P EST LOW 20 MIN: CPT | Performed by: PHYSICIAN ASSISTANT

## 2024-06-19 PROCEDURE — 93246 EXT ECG>7D<15D RECORDING: CPT | Performed by: PHYSICIAN ASSISTANT

## 2024-06-19 NOTE — PROGRESS NOTES
Assessment & Plan     Palpitations    No current symptoms but she gets palpations every couple weeks or so. Come on randomly and last about a minute. Will try to catch with 14 days Zio patch.     - ZIO PATCH 8-14 DAYS (additional cost to patient)  - ZIO PATCH 8-14 DAYS APPLICATION      Lightheadedness    No current symptoms. Could be heart rate related or blood pressure. Blood pressure looks good today but she will bring her home cuff in to her [facility] only appointment to compare. She doesn't think it is accurate. Labs were all done recently and normal. Recommend continuing to monitor while we await Zio patch. Push fluids.    - ZIO PATCH 8-14 DAYS (additional cost to patient)  - ZIO PATCH 8-14 DAYS APPLICATION          Devon Fitzpatrick is a 53 year old, presenting for the following health issues:  Dizziness        6/19/2024     9:42 AM   Additional Questions   Roomed by Jocelyn Bojorquez     History of Present Illness       Reason for visit:  Light headed, tired, balance issues, heartbeat in ears  Symptom onset:  3-7 days ago  Symptoms include:  Light headed, tired, balance issues, heartbeat in ears - the ear issue is more pronounced today  Symptom intensity:  Moderate  Symptom progression:  Staying the same  Had these symptoms before:  Yes  Has tried/received treatment for these symptoms:  No  What makes it worse:  Reading on my phone, bending over    She eats 0-1 servings of fruits and vegetables daily.She consumes 1 sweetened beverage(s) daily.She exercises with enough effort to increase her heart rate 30 to 60 minutes per day.  She exercises with enough effort to increase her heart rate 5 days per week.   She is taking medications regularly.       Dizziness  Onset/Duration: 3/7 days ago- happened yesterday- lasted about 3-4 hours  Description:   Do you feel faint: No  Does it feel like the surroundings (bed, room) are moving: No  Unsteady/off balance: YES  Have you passed out or fallen: No  Accompanying Signs &  "Symptoms:  Heart palpitations or chest pain: YES- palpitations every once in a while- happens randomly  Nausea, vomiting: a little bit of nausea but not enough to stop her from doing things  Weakness or lack of coordination in arms or legs: No  Vision or speech changes: No  Numbness or tingling: No  Ringing in ears (Tinnitus): YES- maybe since she had covid and yesterday could hear heartbeat in ears  Hearing Loss: No  History:   Head trauma/concussion history: No  Previous similar symptoms: YES- last summer and it went away on its own  Recent bleeding history: No  Any new medications (BP?): NO, but she makes herself a sleepy girl mocktail which contains cherry juice, magnesium and sparkling water  Precipitating factors:   Worse with activity: doing certain things like bending and reaching for things  Worse with head movement: YES  Alleviating factors:   Does staying in a fixed position give relief: no       Review of Systems  Constitutional, HEENT, cardiovascular, pulmonary, gi and gu systems are negative, except as otherwise noted.        Objective    /85 (BP Location: Right arm, Patient Position: Sitting, Cuff Size: Adult Regular)   Pulse 81   Temp 98.1  F (36.7  C) (Oral)   Resp 16   Ht 1.753 m (5' 9\")   Wt 81.3 kg (179 lb 4.8 oz)   SpO2 98%   BMI 26.48 kg/m    Body mass index is 26.48 kg/m .      Physical Exam   GENERAL: alert and no distress  EYES: Eyes grossly normal to inspection, PERRL and conjunctivae and sclerae normal  RESP: lungs clear to auscultation - no rales, rhonchi or wheezes  CV: regular rate and rhythm, normal S1 S2, no S3 or S4, no murmur, click or rub, no peripheral edema  MS: no gross musculoskeletal defects noted, no edema  SKIN: no suspicious lesions or rashes  NEURO: Normal strength and tone, mentation intact and speech normal  PSYCH: mentation appears normal, affect normal/bright            Signed Electronically by: Rupesh Shannon PA-C    "

## 2024-10-14 DIAGNOSIS — B00.9 HERPES SIMPLEX VIRUS (HSV) INFECTION: ICD-10-CM

## 2024-10-15 RX ORDER — VALACYCLOVIR HYDROCHLORIDE 500 MG/1
TABLET, FILM COATED ORAL
Qty: 24 TABLET | Refills: 3 | Status: SHIPPED | OUTPATIENT
Start: 2024-10-15

## 2024-10-31 ENCOUNTER — MYC MEDICAL ADVICE (OUTPATIENT)
Dept: FAMILY MEDICINE | Facility: CLINIC | Age: 54
End: 2024-10-31
Payer: COMMERCIAL

## 2024-10-31 ENCOUNTER — NURSE TRIAGE (OUTPATIENT)
Dept: FAMILY MEDICINE | Facility: CLINIC | Age: 54
End: 2024-10-31
Payer: COMMERCIAL

## 2024-10-31 NOTE — TELEPHONE ENCOUNTER
Called [facility] and spoke with on call provider who informs he is unsure of what they can do for pt at visit. He recommends pt be seen in clinic or urgent care.    Called pt and scheduled pt for tomorrow in clinic with Tatiana Sawyer PA-C as Tatiana Sawyer PA-C has slot that opened up.     Rox PETERS RN

## 2024-10-31 NOTE — TELEPHONE ENCOUNTER
We are actually waiting to speak with referring provider, whom I called late in the day.  Patient will be seen at [facility] tomorrow instead.   Message sent to that provider about my chart review and thoughts.

## 2024-10-31 NOTE — TELEPHONE ENCOUNTER
Nurse Triage SBAR    Is this a 2nd Level Triage? YES, LICENSED PRACTITIONER REVIEW IS REQUIRED    Situation: dizziness episodes with diarrhea     Background: Pt has been experiencing episodes since 2019.  She informs she has experienced 8-10 of these episodes since 2019 but they have been increasing in frequency and severity.  Last episode experience was 2 days ago and was the most severe episode yet.  Episodes always start with the urge to to go to the bathroom (patient has diarrhea).    Assessment:   Urge to have BM occurs and pt goes to bathroom.  Lips and tongue will start get tingly, face gets bright red and her eyes get blood shot. She makes it through one BM (diarrhea) and then becomes super lightheaded and has to go lay down. She can't stand up and she becomes super sweaty and lethargic and has to go lay down on the floor and feels like she has to pass out. Pt also has tingling in her hands and arms during episode and feels like she could throw up. Her throat also feels tight. Last episode occurred two days ago and lasted an hour, much of which she was laying on the bathroom floor. She denies fever and chest pain.    Protocol Recommended Disposition:   See in Office Today    Recommendation: Reviewed care advice under care tab with pt. Instructed pt to call back if new or worsening symptoms. Patient was given an opportunity to ask questions, verbalized understanding of plan, and is agreeable.    No appointments in clinic today.    Routing to POD to review and advise.    Rox PETERS RN      Routed to provider    Does the patient meet one of the following criteria for ADS visit consideration? 16+ years old, with an [facility] PCP     TIP  Providers, please consider if this condition is appropriate for management at one of our Acute and Diagnostic Services sites.     If patient is a good candidate, please use dotphrase <dot>triageresponse and select Refer to ADS to document.      Reason for Disposition   MODERATE  dizziness (e.g., interferes with normal activities)  (Exception: Dizziness caused by heat exposure, sudden standing, or poor fluid intake.)    Additional Information   Negative: SEVERE difficulty breathing (e.g., struggling for each breath, speaks in single words)   Negative: Shock suspected (e.g., cold/pale/clammy skin, too weak to stand, low BP, rapid pulse)   Negative: Difficult to awaken or acting confused (e.g., disoriented, slurred speech)   Negative: Fainted, and still feels dizzy afterwards   Negative: Overdose (accidental or intentional) of medications   Negative: New neurologic deficit that is present now: * Weakness of the face, arm, or leg on one side of the body * Numbness of the face, arm, or leg on one side of the body * Loss of speech or garbled speech   Negative: Heart beating < 50 beats per minute OR > 140 beats per minute   Negative: Sounds like a life-threatening emergency to the triager   Negative: Chest pain   Negative: Rectal bleeding, bloody stool, or tarry-black stool   Negative: Vomiting is main symptom   Negative: Diarrhea is main symptom   Negative: Headache is main symptom   Negative: Heat exhaustion suspected (i.e., dehydration from heat exposure)   Negative: Patient states that they are having an anxiety or panic attack   Negative: Dizziness from low blood sugar (i.e., < 60 mg/dl or 3.5 mmol/l)   Negative: SEVERE dizziness (e.g., unable to stand, requires support to walk, feels like passing out now)   Negative: SEVERE headache or neck pain   Negative: Spinning or tilting sensation (vertigo) present now and one or more stroke risk factors (i.e., hypertension, diabetes mellitus, prior stroke/TIA, heart attack, age over 60) (Exception: Prior physician evaluation for this AND no different/worse than usual.)   Negative: Neurologic deficit that was brief (now gone), ANY of the following:* Weakness of the face, arm, or leg on one side of the body* Numbness of the face, arm, or leg on one side  "of the body* Loss of speech or garbled speech   Negative: Loss of vision or double vision  (Exception: Similar to previous migraines.)   Negative: Extra heartbeats, irregular heart beating, or heart is beating very fast (i.e., 'palpitations')   Negative: Difficulty breathing   Negative: Drinking very little and dehydration suspected (e.g., no urine > 12 hours, very dry mouth, very lightheaded)   Negative: Follows bleeding (e.g., stomach, rectum, vagina)  (Exception: Became dizzy from sight of small amount blood.)   Negative: Patient sounds very sick or weak to the triager   Negative: Lightheadedness (dizziness) present now, after 2 hours of rest and fluids   Negative: Spinning or tilting sensation (vertigo) present now   Negative: Fever > 103 F (39.4 C)   Negative: Fever > 100.0 F (37.8 C) and has diabetes mellitus or a weak immune system (e.g., HIV positive, cancer chemotherapy, organ transplant, splenectomy, chronic steroids)    Answer Assessment - Initial Assessment Questions  1. DESCRIPTION: \"Describe your dizziness.\"      Occurs in episode  2. LIGHTHEADED: \"Do you feel lightheaded?\" (e.g., somewhat faint, woozy, weak upon standing)      Not now, occurred 2 days ago  3. VERTIGO: \"Do you feel like either you or the room is spinning or tilting?\" (i.e. vertigo)      no  4. SEVERITY: \"How bad is it?\"  \"Do you feel like you are going to faint?\" \"Can you stand and walk?\"    - MILD: Feels slightly dizzy, but walking normally.    - MODERATE: Feels unsteady when walking, but not falling; interferes with normal activities (e.g., school, work).    - SEVERE: Unable to walk without falling, or requires assistance to walk without falling; feels like passing out now.       severe  5. ONSET:  \"When did the dizziness begin?\"      2019, last episode was 2 days ago  6. AGGRAVATING FACTORS: \"Does anything make it worse?\" (e.g., standing, change in head position)      Starts with urge to defecate  7. HEART RATE: \"Can you tell me " "your heart rate?\" \"How many beats in 15 seconds?\"  (Note: not all patients can do this)        no  8. CAUSE: \"What do you think is causing the dizziness?\"      unsure  9. RECURRENT SYMPTOM: \"Have you had dizziness before?\" If Yes, ask: \"When was the last time?\" \"What happened that time?\"      yes  10. OTHER SYMPTOMS: \"Do you have any other symptoms?\" (e.g., fever, chest pain, vomiting, diarrhea, bleeding)        Diarrhea, sweatiness, lethargy  11. PREGNANCY: \"Is there any chance you are pregnant?\" \"When was your last menstrual period?\"        N/a    Protocols used: Dizziness-A-[place]    "

## 2024-11-01 ENCOUNTER — OFFICE VISIT (OUTPATIENT)
Dept: FAMILY MEDICINE | Facility: CLINIC | Age: 54
End: 2024-11-01
Payer: COMMERCIAL

## 2024-11-01 VITALS
RESPIRATION RATE: 12 BRPM | WEIGHT: 192.8 LBS | DIASTOLIC BLOOD PRESSURE: 84 MMHG | HEIGHT: 69 IN | TEMPERATURE: 98.2 F | OXYGEN SATURATION: 97 % | BODY MASS INDEX: 28.56 KG/M2 | SYSTOLIC BLOOD PRESSURE: 150 MMHG | HEART RATE: 77 BPM

## 2024-11-01 DIAGNOSIS — K62.5 RECTAL BLEEDING: ICD-10-CM

## 2024-11-01 DIAGNOSIS — R42 DIZZINESS: Primary | ICD-10-CM

## 2024-11-01 DIAGNOSIS — R00.2 PALPITATIONS: ICD-10-CM

## 2024-11-01 LAB
ERYTHROCYTE [DISTWIDTH] IN BLOOD BY AUTOMATED COUNT: 12.6 % (ref 10–15)
HCT VFR BLD AUTO: 40.5 % (ref 35–47)
HGB BLD-MCNC: 13.4 G/DL (ref 11.7–15.7)
MCH RBC QN AUTO: 30.8 PG (ref 26.5–33)
MCHC RBC AUTO-ENTMCNC: 33.1 G/DL (ref 31.5–36.5)
MCV RBC AUTO: 93 FL (ref 78–100)
PLATELET # BLD AUTO: 288 10E3/UL (ref 150–450)
RBC # BLD AUTO: 4.35 10E6/UL (ref 3.8–5.2)
WBC # BLD AUTO: 6.4 10E3/UL (ref 4–11)

## 2024-11-01 PROCEDURE — 93246 EXT ECG>7D<15D RECORDING: CPT

## 2024-11-01 PROCEDURE — 99214 OFFICE O/P EST MOD 30 MIN: CPT

## 2024-11-01 PROCEDURE — 85027 COMPLETE CBC AUTOMATED: CPT

## 2024-11-01 PROCEDURE — 83520 IMMUNOASSAY QUANT NOS NONAB: CPT

## 2024-11-01 PROCEDURE — 80053 COMPREHEN METABOLIC PANEL: CPT

## 2024-11-01 PROCEDURE — 36415 COLL VENOUS BLD VENIPUNCTURE: CPT

## 2024-11-01 ASSESSMENT — PAIN SCALES - GENERAL: PAINLEVEL_OUTOF10: NO PAIN (1)

## 2024-11-01 NOTE — PROGRESS NOTES
Nantete Burnett arrived here on 11/1/2024 2:49 PM for 8-14 Days  Zio monitor placement per ordering provider Tatiana Sawyer PA-C for the diagnosis palpitations.  Patient s skin was prepped per protocol.  Zio monitor was placed.  Instructions were reviewed with and given to the patient.  Patient verbalized understanding of wear, troubleshooting and monitor return instructions.  Vicki Rhoades CMA*

## 2024-11-01 NOTE — PROGRESS NOTES
"  Assessment & Plan     (R42) Dizziness  (primary encounter diagnosis)  Unclear etiology of symptoms. She has had 7 episodes as described in HPI since 2019 but in the last 4 months (July, September, October) she has had episodes and they seem to be worsening. See telephone encounter from yesterday in regards to ADS referral potentially. ADS provider had reached out wondering about assessment for mast cell activation syndrome. Will get tryptase level today and did recommend that if repeat episode she come in for tryptase level repeat check within 4 hours of symptom onset. She is having a multitude of symptoms involving different body systems so unclear how to assess other than to do broad differential. After discussion today she is going to follow up with GI in two weeks as scheduled. She was also given a Neurology referral given the dizziness, lightheadedness and other neurologic symptoms. She does not feel that she is having vasovagal effect. Allergy referral given as well for mast cell activation concern. Follow up more urgently if symptoms persist or worsen in any way. Message sent to PCP as well regarding symptoms.   Plan: Tryptase, Tryptase, Adult Neurology          Referral, Adult Allergy/Asthma          Referral          (K62.5) Rectal bleeding  Recommend follow up with GI, scheduled for November 13th. CBC and CMP today in clinic.   Plan: CBC with platelets, Comprehensive metabolic         panel (BMP + Alb, Alk Phos, ALT, AST, Total.         Bili, TP)    (R00.2) Palpitations  Recommendation for zio patch monitoring previously but was unable to get in to have this placed due to issues with work schedule. Placed today in clinic due to ongoing symptoms of unclear etiology.   Plan: Zio patch monitoring for 14 days.           BMI  Estimated body mass index is 28.47 kg/m  as calculated from the following:    Height as of this encounter: 1.753 m (5' 9\").    Weight as of this encounter: 87.5 kg (192 " lb 12.8 oz).   Weight management plan: Discussed healthy diet and exercise guidelines      Follow up as needed if symptoms worsen or fail to improve.     Subjective   Nanette is a 54 year old, presenting for the following health issues:  Dizziness (Nurse Triage SBAR- Is this a 2nd Level Triage? YES, LICENSED PRACTITIONER REVIEW IS REQUIRED / Situation: dizziness episodes with diarrhea / Background: Pt has been experiencing episodes since 2019.  She informs she has experienced 8-10 of these episodes since 2019 but they have been increasing in frequency and severity.  Last episode experience was 2 days ago and was the most severe episode yet.  Episodes always start with the urge to to go to the bathroom (patient has diarrhea).  Assessment: Urge to )        11/1/2024     1:44 PM   Additional Questions   Roomed by RUPALI ROMERO     History of Present Illness       Reason for visit:  Many reasons. More frequent weird episodes that involve bowel movements and sudden onset weakness, tingling on lips, tongue, hands and arms, sweating, dry mouth, diarrhea, red face and eyes, trouble breathing, tight throat, light headed and ending pale  Symptom onset:  More than a month  Symptoms include:  Above  Symptom intensity:  Severe  Symptom progression:  Worsening  Had these symptoms before:  Yes  Has tried/received treatment for these symptoms:  No  What makes it better:  Water as the episode starts to subside. A cold floor to lie on. She is missing 1 dose(s) of medications per week.  She is not taking prescribed medications regularly due to remembering to take.     Dizziness  Onset/Duration: dizziness  Description:   Do you feel faint: YES  Does it feel like the surroundings (bed, room) are moving: No  Unsteady/off balance: YES / on the toilet  Have you passed out or fallen: close to passout  Intensity: severe  Progression of Symptoms: waxing and waning  Accompanying Signs & Symptoms:  Heart palpitations or chest pain:  "YES  Nausea, vomiting: YES  Weakness or lack of coordination in arms or legs: YES  Vision or speech changes: No  Numbness or tingling: YES  Ringing in ears (Tinnitus): No  Hearing Loss: No  History:   Head trauma/concussion history: No  Previous similar symptoms: No  Recent bleeding history: No  Any new medications (BP?): No  Precipitating factors:   Worse with activity:   Worse with head movement:   Alleviating factors:   Does staying in a fixed position give relief: YES - laying on the floor  Therapies tried and outcome:   Has happened in total 7 times, now having more often (episode July, September, October of this year). Initial episode was in 2019. Will feel the need to defecate and prior to going to the bathroom will have sudden onset of dizziness, flushes faced, sometimes tingling of the tongue and lips, flushed face and bloodshot eyes. Gets very weak. Able to have a bowel movement and typically diarrhea (does have diarrhea at baseline). Has not had testing for this previously.      Review of Systems  Constitutional, HEENT, cardiovascular, pulmonary, gi and gu systems are negative, except as otherwise noted.        Objective    BP (!) 150/84 (BP Location: Right arm, Patient Position: Sitting, Cuff Size: Adult Regular)   Pulse 77   Temp 98.2  F (36.8  C) (Oral)   Resp 12   Ht 1.753 m (5' 9\")   Wt 87.5 kg (192 lb 12.8 oz)   SpO2 97%   BMI 28.47 kg/m    Body mass index is 28.47 kg/m .  Physical Exam   GENERAL: alert and no distress  EYES: Eyes grossly normal to inspection, PERRL and conjunctivae and sclerae normal  RESP: lungs clear to auscultation - no rales, rhonchi or wheezes  CV: regular rate and rhythm, normal S1 S2, no S3 or S4, no murmur, click or rub  MS: no gross musculoskeletal defects noted, no edema      Signed Electronically by: Tatiana Sawyer PA-C    "

## 2024-11-01 NOTE — Clinical Note
Saw Nanette today for abnormal symptoms. ADS recommended testing for tryptase for mast cell activation. I did some baseline blood work but wondering if you wanted to do any additional testing? Also gave referrals to Neurology and Allergy. She is seen GI in the next two weeks as well.   Thanks! Tatiana Sawyer PA-C

## 2024-11-02 LAB
ALBUMIN SERPL BCG-MCNC: 4.5 G/DL (ref 3.5–5.2)
ALP SERPL-CCNC: 71 U/L (ref 40–150)
ALT SERPL W P-5'-P-CCNC: 25 U/L (ref 0–50)
ANION GAP SERPL CALCULATED.3IONS-SCNC: 8 MMOL/L (ref 7–15)
AST SERPL W P-5'-P-CCNC: 29 U/L (ref 0–45)
BILIRUB SERPL-MCNC: 0.3 MG/DL
BUN SERPL-MCNC: 9.7 MG/DL (ref 6–20)
CALCIUM SERPL-MCNC: 9.3 MG/DL (ref 8.8–10.4)
CHLORIDE SERPL-SCNC: 106 MMOL/L (ref 98–107)
CREAT SERPL-MCNC: 0.72 MG/DL (ref 0.51–0.95)
EGFRCR SERPLBLD CKD-EPI 2021: >90 ML/MIN/1.73M2
GLUCOSE SERPL-MCNC: 89 MG/DL (ref 70–99)
HCO3 SERPL-SCNC: 30 MMOL/L (ref 22–29)
POTASSIUM SERPL-SCNC: 4.6 MMOL/L (ref 3.4–5.3)
PROT SERPL-MCNC: 7.4 G/DL (ref 6.4–8.3)
SODIUM SERPL-SCNC: 144 MMOL/L (ref 135–145)

## 2024-11-05 ENCOUNTER — TELEPHONE (OUTPATIENT)
Dept: FAMILY MEDICINE | Facility: CLINIC | Age: 54
End: 2024-11-05

## 2024-11-05 ENCOUNTER — OFFICE VISIT (OUTPATIENT)
Dept: ALLERGY | Facility: CLINIC | Age: 54
End: 2024-11-05
Payer: COMMERCIAL

## 2024-11-05 DIAGNOSIS — T78.3XXD ACE INHIBITOR-AGGRAVATED ANGIOEDEMA, SUBSEQUENT ENCOUNTER: Primary | ICD-10-CM

## 2024-11-05 DIAGNOSIS — R19.8 GI SYMPTOM: ICD-10-CM

## 2024-11-05 DIAGNOSIS — L71.9 ROSACEA: ICD-10-CM

## 2024-11-05 DIAGNOSIS — R42 DIZZINESS: ICD-10-CM

## 2024-11-05 DIAGNOSIS — T78.3XXD ANGIOEDEMA, SUBSEQUENT ENCOUNTER: ICD-10-CM

## 2024-11-05 DIAGNOSIS — T46.4X5D ACE INHIBITOR-AGGRAVATED ANGIOEDEMA, SUBSEQUENT ENCOUNTER: Primary | ICD-10-CM

## 2024-11-05 LAB — TRYPTASE SERPL-MCNC: 5.1 UG/L

## 2024-11-05 PROCEDURE — 99203 OFFICE O/P NEW LOW 30 MIN: CPT | Mod: GC | Performed by: DERMATOLOGY

## 2024-11-05 NOTE — LETTER
11/5/2024      Nanette Burnett  [address] [place] [address]      Dear Colleague,    Thank you for referring your patient, Nanette Burnett, to the [facility] [place]. Please see a copy of my visit note below.    [facility] Dermato-allergology Note  Office visit  Encounter Date: Nov 5, 2024  ____________________________________________    CC: Allergy Consult (See MD note.)      HPI:  (Nov 5, 2024)  Ms. Nanette Burnett is a(n) 54 year old female who presents today as new patient for allergy consultation  - Referred by LILLIE Ruiz ([facility]) on 11/1/24 for dizziness  - Patient is also undergoing workup for heart palpitations, with Zio patch monitoring having recently been ordered by LILLIE Sawyer, and rectal bleeding  - LILLIE Sawyer noted on 11/1/24 that:  - In regards to ADS referral potentially. ADS provider had reached out wondering about assessment for mast cell activation syndrome  - 11/1/24 tryptase is 5.1; no prior tryptase level  - After discussion today she is going to follow up with GI in two weeks as scheduled. She was also given a Neurology referral given the dizziness, lightheadedness and other neurologic symptoms. She does not feel that she is having vasovagal effect.   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; felt faint and had to sit down  - Has happened 7-8x since 2019, with 2 episodes in 2022, and then 3x since 7/2024  - 2024 episodes:  - 1/2024 episode involved vomiting and blood in stool  - 7/2024 episode was when she  was at work  - 9/2024 episode was while she was at lunch  - Last episode was last Tuesday after she had lunch, which was the same breakfast and lunch she eats daily  - Takes ibuprofen for headaches, but denies taking it a few hours prior episodes  - Has been on lisinopril since 5/22/17  - Denies anything of the ordinary around the time of these episodes  - Otherwise feeling well in usual state of health    Physical Exam:  General: In no acute distress, well-developed, well-nourished  Eyes: no conjunctivitis  ENT: no signs of rhinitis   Pulmonary: no wheezing or coughing  Skin: Focused examination of the face was performed.  There are mild erythematous papules and scattered telangectasias on the face. Otherwise, no active eczematous skin lesions on visible skin     Past Medical History:   Patient Active Problem List   Diagnosis     Herpes simplex virus (HSV) infection     Anxiety state     Allergic state     External hemorrhoids     CARDIOVASCULAR SCREENING; LDL GOAL LESS THAN 160     Dysplasia of cervix     Migraine     Anemia, unspecified type     Hypertension goal BP (blood pressure) < 140/90     Rosenthal's neuroma of left foot     Impingement syndrome of right shoulder     Past Medical History:   Diagnosis Date     Allergy, unspecified not elsewhere classified     Hayfever, better in the early 2000's     Anxiety state, unspecified      Dysplastic nevus     near toe. another suspicious mole right thigh. sees Derm - both removed     Genital herpes, unspecified     about 2 - 4 flares per year     Hypertension      Impingement syndrome of right shoulder 2/20/2023     Infection due to 2019 novel coronavirus 11/2021     Insomnia, unspecified      Migraine, unspecified, without mention of intractable migraine without mention of status migrainosus     will have some with aura; has others     Allergies:  Allergies   Allergen Reactions     Ace Inhibitors Angioedema     11/5/24 Dr. Sacha Berrios: Detailed discussion during  allergy consult. Lisinopril likely culprit of lip angioedema. Would recommend all ACE inhibitors be avoided, and instead patient be started on calcium antagonist, ARB, diuretic, or as a last resort, ARB.     Medications:  Current Outpatient Medications   Medication Sig Dispense Refill     buPROPion (WELLBUTRIN SR) 150 MG 12 hr tablet Take 1 tablet (150 mg) by mouth at bedtime 90 tablet 4     escitalopram (LEXAPRO) 10 MG tablet Take 1.5 tablets (15 mg) by mouth daily 135 tablet 2     gabapentin (NEURONTIN) 100 MG capsule 1 TAB each night for 7 days, then 2 per night for 7 days and then 3 per night 90 capsule 2     hydrOXYzine HCl (ATARAX) 25 MG tablet TAKE 1 TABLET(25 MG) BY MOUTH EVERY NIGHT AS NEEDED FOR ANXIETY OR SLEEP 30 tablet 11     lisinopril (ZESTRIL) 20 MG tablet TAKE 1 TABLET(20 MG) BY MOUTH DAILY 90 tablet 3     rizatriptan (MAXALT-MLT) 10 MG ODT Take 1 tablet (10 mg) by mouth at onset of headache for migraine May repeat in 2 hours. Max 3 tablets/24 hours. 12 tablet 3     SUMAtriptan (IMITREX) 50 MG tablet TAKE 1 TABLET(50 MG) BY MOUTH AT ONSET OF HEADACHE FOR MIGRAINE. MAY REPEAT DOSE IN 2 HOURS. DO NOT EXCEED 200 MG IN 24 HOURS 12 tablet 5     valACYclovir (VALTREX) 500 MG tablet TAKE 1 TABLET BY MOUTH TWICE DAILY FOR 3 DAYS WITH FLARES 24 tablet 3     No current facility-administered medications for this visit.     Previous Labs, Allergy Tests, Dermatopathology, [employer] LILLIE Ruiz ([facility])   FROM [facility]:  Reason for visit:  Many reasons. More frequent weird episodes that involve bowel movements and sudden onset weakness, tingling on lips, tongue, hands and arms, sweating, dry mouth, diarrhea, red face and eyes, trouble breathing, tight throat, light headed and ending pale  Symptom onset:  More than a month  Symptoms include:  Above  Symptom intensity:  Severe  Symptom progression:  Worsening  Had these symptoms before:  Yes  Has tried/received treatment for these symptoms:  No  What makes it  better:  Water as the episode starts to subside. A cold floor to lie on. She is missing 1 dose(s) of medications per week.  She is not taking prescribed medications regularly due to remembering to take.     Dizziness  Onset/Duration: dizziness  Description:   Do you feel faint: YES  Does it feel like the surroundings (bed, room) are moving: No  Unsteady/off balance: YES / on the toilet  Have you passed out or fallen: close to passout  Intensity: severe  Progression of Symptoms: waxing and waning  Accompanying Signs & Symptoms:  Heart palpitations or chest pain: YES  Nausea, vomiting: YES  Weakness or lack of coordination in arms or legs: YES  Vision or speech changes: No  Numbness or tingling: YES  Ringing in ears (Tinnitus): No  Hearing Loss: No  History:   Head trauma/concussion history: No  Previous similar symptoms: No  Recent bleeding history: No  Any new medications (BP?): No  Precipitating factors:   Worse with activity:   Worse with head movement:   Alleviating factors:   Does staying in a fixed position give relief: YES - laying on the floor  Therapies tried and outcome:   Has happened in total 7 times, now having more often (episode July, September, October of this year). Initial episode was in 2019. Will feel the need to defecate and prior to going to the bathroom will have sudden onset of dizziness, flushes faced, sometimes tingling of the tongue and lips, flushed face and bloodshot eyes. Gets very weak. Able to have a bowel movement and typically diarrhea (does have diarrhea at baseline). Has not had testing for this previously.    FROM A&P:  Dizziness  Unclear etiology of symptoms. She has had 7 episodes as described in HPI since 2019 but in the last 4 months (July, September, October) she has had episodes and they seem to be worsening. See telephone encounter from yesterday in regards to ADS referral potentially. ADS provider had reached out wondering about assessment for mast cell activation  syndrome. Will get tryptase level today and did recommend that if repeat episode she come in for tryptase level repeat check within 4 hours of symptom onset. She is having a multitude of symptoms involving different body systems so unclear how to assess other than to do broad differential. After discussion today she is going to follow up with GI in two weeks as scheduled. She was also given a Neurology referral given the dizziness, lightheadedness and other neurologic symptoms. She does not feel that she is having vasovagal effect. Allergy referral given as well for mast cell activation concern. Follow up more urgently if symptoms persist or worsen in any way. Message sent to PCP as well regarding symptoms.   Plan: Tryptase, Tryptase, Adult Neurology          Referral, Adult Allergy/Asthma  Referral          Rectal bleeding  Recommend follow up with GI, scheduled for November 13th. CBC and CMP today in clinic.   Plan: CBC with platelets, Comprehensive metabolic panel (BMP + Alb, Alk Phos, ALT, AST, Total. Bili, TP)     Palpitations  Recommendation for zio patch monitoring previously but was unable to get in to have this placed due to issues with work schedule. Placed today in clinic due to ongoing symptoms of unclear etiology.   Plan: Zio patch monitoring for 14 days.    Component      Latest Ref Rng 11/1/2024  3:04 PM   Sodium      135 - 145 mmol/L 144    Potassium      3.4 - 5.3 mmol/L 4.6    Carbon Dioxide (CO2)      22 - 29 mmol/L 30 (H)    Anion Gap      7 - 15 mmol/L 8    Urea Nitrogen      6.0 - 20.0 mg/dL 9.7    Creatinine      0.51 - 0.95 mg/dL 0.72    GFR Estimate      >60 mL/min/1.73m2 >90    Calcium      8.8 - 10.4 mg/dL 9.3    Chloride      98 - 107 mmol/L 106    Glucose      70 - 99 mg/dL 89    Alkaline Phosphatase      40 - 150 U/L 71    AST      0 - 45 U/L 29    ALT      0 - 50 U/L 25    Protein Total      6.4 - 8.3 g/dL 7.4    Albumin      3.5 - 5.2 g/dL 4.5    Bilirubin Total       <=1.2 mg/dL 0.3    WBC      4.0 - 11.0 10e3/uL 6.4    RBC Count      3.80 - 5.20 10e6/uL 4.35    Hemoglobin      11.7 - 15.7 g/dL 13.4    Hematocrit      35.0 - 47.0 % 40.5    MCV      78 - 100 fL 93    MCH      26.5 - 33.0 pg 30.8    MCHC      31.5 - 36.5 g/dL 33.1    RDW      10.0 - 15.0 % 12.6    Platelet Count      150 - 450 10e3/uL 288      Referred By: Tatiana Sawyer PA-C ()  [address] [place] [address]     Allergy Tests:  Past Allergy Test    Family History:  Family History   Problem Relation Age of Onset     Cancer Other         Prostate CA     Hypertension Father      Neurologic Disorder Father         ALS     Thyroid Disease Mother         actually hypoglycemia; not thyroid.     Hypertension Mother      Breast Cancer Mother         Was taken care of with radiation     Hypertension Maternal Grandmother      Cerebrovascular Disease Maternal Grandmother      Cancer Maternal Grandmother         ovarian/uterine dx at age 50's     Breast Cancer Paternal Grandmother         dx at age 60's     Social History:  The patient works as an .   Patient has the following hobbies or non-occupational exposure: Running errands, concerts.    Order for Future Allergy Testing:    [x] Outpatient  [] Inpatient: العراقي..../ Bed ...    Skin Atopy (atopic dermatitis)? [] Yes     [x] No  Comments:   Childhood eczema?   [] Yes     [x] No  Comments:   Hand eczema?   [x] Yes     [] No  If yes, leading hand? [x] Right     [] Left     [] Ambidextrous  Comments: Red, itchy, with fluid-filled papules on hand; hasn't occurred in a few years    Contact allergies?     Comments:   Including adhesives/bandages? [] Yes     [x] No  Comments:   Including metals?   [] Yes     [x] No  Comments:   Other substances?   [] Yes     [x] No  Comments:     Drug allergies?   [] Yes     [x] No  Comments:     Angioedema?   [x] Yes     [] No  Comments: Lips swell during episodes.- see HPI    Urticaria?   [] Yes     [x]  No  Comments:     Food allergies?   [] Yes     [x] No  Comments:     Pet allergies?   [x] Yes     [] No  Comments: Stuffy eyes and nose from cats    Environmental allergy symptoms?  [x] Conjunctivitis  [] Otitis  [] Pharyngitis  [] Polyposis  [] Postnasal Drip  [x] Rhinitis  [] Sinusitis  [] None  Comments:     HENT Operations?  [] Adenoids [] Septum [] Sinus  [] Tonsils        [] Other:   [x] None  Comments:     Pulmonary symptoms (from birth to present)?  [] Asthma bronchiale  Inhaler(s)?:   [] Coughing  [] Other  [x] None  Comments:     Environmental and pulmonary symptoms aggravated by?  Season: [] None     [] I     [] II     [] III     [] IV     [] V     [] VI          [] VII     [] VIII     [] IX     [] X     [] XI     [] XII     [] Perennial  Time of Day: [] None     [] Morning     [] Noon     [] Evening     [] Night     [] Whole Day  Location/Changes: [] None     [] Inside     [] Outside     [] Mountain     [] Sea     [] Other:   Triggers (specific): [] None     [x] Animals     [] Dust     [] Mold     [] Plants     [] Other:   Triggers (other): [] None     [] Psyche     [] Sport     [] Work     [] Other:   Irritant: [] None     [] Cold     [] Heat     [] Odors     [] Physical Efforts     [] Smoke     [] Other:     Order for PATCH TESTS  Reason for tests (suspected allergy): not necessary at this time  Known previous allergies: none  Standardized panels  [] Standard panel (40 tests)  [] Preservatives & Antimicrobials (31 tests)  [] Emulsifiers & Additives (25 tests)   [] Perfumes/Flavours & Plants (25 tests)  [] Hairdresser panel (12 tests)  [] Rubber Chemicals (22 tests)  [] Plastics (26 tests)  [] Colorants/Dyes/Food additives (20 tests)  [] Metals (implants/dental) (24 tests)  [] Local anaesthetics/NSAIDs (13 tests)  [] Antibiotics & Antimycotics (14 tests)   [] Corticosteroids (15 tests)   [] Photopatch test (62 tests)   [] Others:     [] Patient's Own Products:   DO NOT test if chemical or biological  identity is unknown!   always ask from patient the product information and safety sheets (MSDS)       Order for PRICK TESTS    Reason for tests (suspected allergy): not necessary at this time  Known previous allergies: none    Standardized prick panels  [] Atopic panel (20 tests)  [] Pediatric Panel (12 tests)  [] Milk, Meat, Eggs, Grains (20 tests)   [] Dust, Epithelia, Feathers (10 tests)  [] Fish, Seafood, Shellfish (17 tests)  [] Nuts, Beans (14 tests)  [] Spice, Vegetable, Fruit (17 tests)  [] Pollen Panel = Tree, Grass, Weed (24 tests)  [] Others:     [] Patient's Own Products:   DO NOT test if chemical or biological identity is unknown!   always ask from patient the product information and safety sheets (MSDS)     Standardized intradermal tests  [] Alternaria alternata  [] Aspergillus fumigatus  [] Cladosporium herbarum   [] Penicillium notatum  [] Dermatophagoides farinae  [] Dermatophagoides pteronyssinus  [] Dog Epithelium  [] Cat Epithelium  [] Others:     [] Bee venom   [] Wasp venom  !!Specific protocol with dilutions!!       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    ==> Treatment Plan:    >> Detailed discussion with patient regarding pathophysiology of angioedema in relation to   - Epic allergy list updated:  - ACE inhibitors added:  - Reaction: angioedema  - Severity: high  - Reaction Type: intolerance  - Comment: Detailed discussion during allergy consult. Lisinopril likely culprit of lip angioedema. Would recommend all ACE inhibitors be avoided, and instead patient be started on calcium antagonist, ARB, diuretic, or as a last resort, ARB.    - Sent direct message via Epic to Dr. Claudia Erickson (PCP) regarding the above.  - Patient must talk with her PCP before discontinuing lisinopril to not cause her blood pressure to become uncontrolled. BP on 11/1/24 was 150/84.  - Encouraged patient to follow up with her PCP tomorrow regarding switching from lisinopril to a different medication.  - Also encouraged her to follow up with specialists, including GI and neurology, as scheduled.    >> If another episode occurs, recommend patient record a 24-hour retrospective diary, noting all diet, medications, activities, etc. to determine possible allergen. Report to clinic via [facility]. If skin reactions, take photos and upload to MyChart.  - If she reports to ED, if angioedema is involved, tryptase should be taken. PCP entered standing order in Epic.    >> If rosacea becomes an issue, she should follow up with general dermatology for treatment.    Procedures Performed: None    Staff Involved: Provider, Staff, Medical Student, and Scribe    Scribe Disclosure:   I, SOLEDAD ESCOBAR, am serving as a scribe to document services personally performed by Sacha Berrios MD based on data collection and the provider's statements to me.     Staff Physician Comments:  I was present with the scribe who participated in the documentation of the note. I  have verified the history and personally performed the physical exam and medical decision making. I agree with the assessment and plan as documented in the note. I have reviewed and if necessary amended the note.      Also, I saw and evaluated the patient with the resident and I agree with the assessment and plan as documented in the resident's note.    Sacha Berrios MD  Professor  Head of Dermato-Allergy Division  Department of Dermatology  [facility]     Follow-up in Derm-Allergy clinic PRN  - scheduled for neurology consult on 11/11/24 and then, per patient, to see GI on 11/13/24    I spent a total of 30 minutes with Nanette Burnett. This time was spent counseling the patient and/or coordinating care, explaining the allergy tests, performing allergy tests and assessing the clinical relevance.       Again, thank you for allowing me to participate in the care of your patient.        Sincerely,        Sacha Berrios MD

## 2024-11-05 NOTE — PATIENT INSTRUCTIONS
You can always access your most recent office visit note with this allergy clinic via [facility] MyChart, which contains all of your results from testing performed by Dr. Berrios along with the details of the discussed treatment plan.  If you do not have access to [facility], please discuss with a [facility] staff member. Additionally, if your provider is within [facility] or their EMR participates in the [employer] network, they can also access this information.

## 2024-11-05 NOTE — PROGRESS NOTES
[facility] Dermato-allergology Note  Office visit  Encounter Date: Nov 5, 2024  ____________________________________________    CC: Allergy Consult (See MD note.)      HPI:  (Nov 5, 2024)  Ms. Nanette Burnett is a(n) 54 year old female who presents today as new patient for allergy consultation  - Referred by LILLIE Ruiz (FP) on 11/1/24 for dizziness  - Patient is also undergoing workup for heart palpitations, with Zio patch monitoring having recently been ordered by LILLIE Sawyer, and rectal bleeding  - LILLIE Sawyer noted on 11/1/24 that:  - In regards to ADS referral potentially. ADS provider had reached out wondering about assessment for mast cell activation syndrome  - 11/1/24 tryptase is 5.1; no prior tryptase level  - After discussion today she is going to follow up with GI in two weeks as scheduled. She was also given a Neurology referral given the dizziness, lightheadedness and other neurologic symptoms. She does not feel that she is having vasovagal effect.   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; felt faint and had to sit down  - Has happened 7-8x since 2019, with 2 episodes in 2022, and then 3x since 7/2024  - 2024 episodes:  - 1/2024 episode involved vomiting and blood in stool  - 7/2024 episode was when she was at work  - 9/2024 episode was while she was at lunch  - Last episode was last Tuesday after she had lunch, which was the same breakfast and lunch she eats daily  - Takes ibuprofen for headaches, but denies taking it a few hours prior  episodes  - Has been on lisinopril since 5/22/17  - Denies anything of the ordinary around the time of these episodes  - Otherwise feeling well in usual state of health    Physical Exam:  General: In no acute distress, well-developed, well-nourished  Eyes: no conjunctivitis  ENT: no signs of rhinitis   Pulmonary: no wheezing or coughing  Skin: Focused examination of the face was performed.  There are mild erythematous papules and scattered telangectasias on the face. Otherwise, no active eczematous skin lesions on visible skin     Past Medical History:   Patient Active Problem List   Diagnosis    Herpes simplex virus (HSV) infection    Anxiety state    Allergic state    External hemorrhoids    CARDIOVASCULAR SCREENING; LDL GOAL LESS THAN 160    Dysplasia of cervix    Migraine    Anemia, unspecified type    Hypertension goal BP (blood pressure) < 140/90    Rosenthal's neuroma of left foot    Impingement syndrome of right shoulder     Past Medical History:   Diagnosis Date    Allergy, unspecified not elsewhere classified     Hayfever, better in the early 2000's    Anxiety state, unspecified     Dysplastic nevus     near toe. another suspicious mole right thigh. sees Derm - both removed    Genital herpes, unspecified     about 2 - 4 flares per year    Hypertension     Impingement syndrome of right shoulder 2/20/2023    Infection due to 2019 novel coronavirus 11/2021    Insomnia, unspecified     Migraine, unspecified, without mention of intractable migraine without mention of status migrainosus     will have some with aura; has others     Allergies:  Allergies   Allergen Reactions    Ace Inhibitors Angioedema     11/5/24 Dr. Sacha Berrios: Detailed discussion during allergy consult. Lisinopril likely culprit of lip angioedema. Would recommend all ACE inhibitors be avoided, and instead patient be started on calcium antagonist, ARB, diuretic, or as a last resort, ARB.     Medications:  Current Outpatient Medications    Medication Sig Dispense Refill    buPROPion (WELLBUTRIN SR) 150 MG 12 hr tablet Take 1 tablet (150 mg) by mouth at bedtime 90 tablet 4    escitalopram (LEXAPRO) 10 MG tablet Take 1.5 tablets (15 mg) by mouth daily 135 tablet 2    gabapentin (NEURONTIN) 100 MG capsule 1 TAB each night for 7 days, then 2 per night for 7 days and then 3 per night 90 capsule 2    hydrOXYzine HCl (ATARAX) 25 MG tablet TAKE 1 TABLET(25 MG) BY MOUTH EVERY NIGHT AS NEEDED FOR ANXIETY OR SLEEP 30 tablet 11    lisinopril (ZESTRIL) 20 MG tablet TAKE 1 TABLET(20 MG) BY MOUTH DAILY 90 tablet 3    rizatriptan (MAXALT-MLT) 10 MG ODT Take 1 tablet (10 mg) by mouth at onset of headache for migraine May repeat in 2 hours. Max 3 tablets/24 hours. 12 tablet 3    SUMAtriptan (IMITREX) 50 MG tablet TAKE 1 TABLET(50 MG) BY MOUTH AT ONSET OF HEADACHE FOR MIGRAINE. MAY REPEAT DOSE IN 2 HOURS. DO NOT EXCEED 200 MG IN 24 HOURS 12 tablet 5    valACYclovir (VALTREX) 500 MG tablet TAKE 1 TABLET BY MOUTH TWICE DAILY FOR 3 DAYS WITH FLARES 24 tablet 3     No current facility-administered medications for this visit.     Previous Labs, Allergy Tests, Dermatopathology, [employer] LILLIE Ruiz ([facility])   FROM [facility] [place]:  Reason for visit:  Many reasons. More frequent weird episodes that involve bowel movements and sudden onset weakness, tingling on lips, tongue, hands and arms, sweating, dry mouth, diarrhea, red face and eyes, trouble breathing, tight throat, light headed and ending pale  Symptom onset:  More than a month  Symptoms include:  Above  Symptom intensity:  Severe  Symptom progression:  Worsening  Had these symptoms before:  Yes  Has tried/received treatment for these symptoms:  No  What makes it better:  Water as the episode starts to subside. A cold floor to lie on. She is missing 1 dose(s) of medications per week.  She is not taking prescribed medications regularly due to remembering to take.     Dizziness  Onset/Duration: dizziness  Description:   Do  you feel faint: YES  Does it feel like the surroundings (bed, room) are moving: No  Unsteady/off balance: YES / on the toilet  Have you passed out or fallen: close to passout  Intensity: severe  Progression of Symptoms: waxing and waning  Accompanying Signs & Symptoms:  Heart palpitations or chest pain: YES  Nausea, vomiting: YES  Weakness or lack of coordination in arms or legs: YES  Vision or speech changes: No  Numbness or tingling: YES  Ringing in ears (Tinnitus): No  Hearing Loss: No  History:   Head trauma/concussion history: No  Previous similar symptoms: No  Recent bleeding history: No  Any new medications (BP?): No  Precipitating factors:   Worse with activity:   Worse with head movement:   Alleviating factors:   Does staying in a fixed position give relief: YES - laying on the floor  Therapies tried and outcome:   Has happened in total 7 times, now having more often (episode July, September, October of this year). Initial episode was in 2019. Will feel the need to defecate and prior to going to the bathroom will have sudden onset of dizziness, flushes faced, sometimes tingling of the tongue and lips, flushed face and bloodshot eyes. Gets very weak. Able to have a bowel movement and typically diarrhea (does have diarrhea at baseline). Has not had testing for this previously.    FROM A&P:  Dizziness  Unclear etiology of symptoms. She has had 7 episodes as described in HPI since 2019 but in the last 4 months (July, September, October) she has had episodes and they seem to be worsening. See telephone encounter from yesterday in regards to ADS referral potentially. ADS provider had reached out wondering about assessment for mast cell activation syndrome. Will get tryptase level today and did recommend that if repeat episode she come in for tryptase level repeat check within 4 hours of symptom onset. She is having a multitude of symptoms involving different body systems so unclear how to assess other than to do  broad differential. After discussion today she is going to follow up with GI in two weeks as scheduled. She was also given a Neurology referral given the dizziness, lightheadedness and other neurologic symptoms. She does not feel that she is having vasovagal effect. Allergy referral given as well for mast cell activation concern. Follow up more urgently if symptoms persist or worsen in any way. Message sent to PCP as well regarding symptoms.   Plan: Tryptase, Tryptase, Adult Neurology          Referral, Adult Allergy/Asthma  Referral          Rectal bleeding  Recommend follow up with GI, scheduled for November 13th. CBC and CMP today in clinic.   Plan: CBC with platelets, Comprehensive metabolic panel (BMP + Alb, Alk Phos, ALT, AST, Total. Bili, TP)     Palpitations  Recommendation for zio patch monitoring previously but was unable to get in to have this placed due to issues with work schedule. Placed today in clinic due to ongoing symptoms of unclear etiology.   Plan: Zio patch monitoring for 14 days.    Component      Latest Ref Rng 11/1/2024  3:04 PM   Sodium      135 - 145 mmol/L 144    Potassium      3.4 - 5.3 mmol/L 4.6    Carbon Dioxide (CO2)      22 - 29 mmol/L 30 (H)    Anion Gap      7 - 15 mmol/L 8    Urea Nitrogen      6.0 - 20.0 mg/dL 9.7    Creatinine      0.51 - 0.95 mg/dL 0.72    GFR Estimate      >60 mL/min/1.73m2 >90    Calcium      8.8 - 10.4 mg/dL 9.3    Chloride      98 - 107 mmol/L 106    Glucose      70 - 99 mg/dL 89    Alkaline Phosphatase      40 - 150 U/L 71    AST      0 - 45 U/L 29    ALT      0 - 50 U/L 25    Protein Total      6.4 - 8.3 g/dL 7.4    Albumin      3.5 - 5.2 g/dL 4.5    Bilirubin Total      <=1.2 mg/dL 0.3    WBC      4.0 - 11.0 10e3/uL 6.4    RBC Count      3.80 - 5.20 10e6/uL 4.35    Hemoglobin      11.7 - 15.7 g/dL 13.4    Hematocrit      35.0 - 47.0 % 40.5    MCV      78 - 100 fL 93    MCH      26.5 - 33.0 pg 30.8    MCHC      31.5 - 36.5 g/dL 33.1     RDW      10.0 - 15.0 % 12.6    Platelet Count      150 - 450 10e3/uL 288      Referred By: Tatiana Sawyer PA-C ) [address] [place] [address]     Allergy Tests:  Past Allergy Test    Family History:  Family History   Problem Relation Age of Onset    Cancer Other         Prostate CA    Hypertension Father     Neurologic Disorder Father         ALS    Thyroid Disease Mother         actually hypoglycemia; not thyroid.    Hypertension Mother     Breast Cancer Mother         Was taken care of with radiation    Hypertension Maternal Grandmother     Cerebrovascular Disease Maternal Grandmother     Cancer Maternal Grandmother         ovarian/uterine dx at age 50's    Breast Cancer Paternal Grandmother         dx at age 60's     Social History:  The patient works as an .   Patient has the following hobbies or non-occupational exposure: Running errands, concerts.    Order for Future Allergy Testing:    [x] Outpatient  [] Inpatient: العراقي..../ Bed ...    Skin Atopy (atopic dermatitis)? [] Yes     [x] No  Comments:   Childhood eczema?   [] Yes     [x] No  Comments:   Hand eczema?   [x] Yes     [] No  If yes, leading hand? [x] Right     [] Left     [] Ambidextrous  Comments: Red, itchy, with fluid-filled papules on hand; hasn't occurred in a few years    Contact allergies?     Comments:   Including adhesives/bandages? [] Yes     [x] No  Comments:   Including metals?   [] Yes     [x] No  Comments:   Other substances?   [] Yes     [x] No  Comments:     Drug allergies?   [] Yes     [x] No  Comments:     Angioedema?   [x] Yes     [] No  Comments: Lips swell during episodes.- see HPI    Urticaria?   [] Yes     [x] No  Comments:     Food allergies?   [] Yes     [x] No  Comments:     Pet allergies?   [x] Yes     [] No  Comments: Stuffy eyes and nose from cats    Environmental allergy symptoms?  [x] Conjunctivitis  [] Otitis  [] Pharyngitis  [] Polyposis  [] Postnasal Drip  [x] Rhinitis  []  Sinusitis  [] None  Comments:     HENT Operations?  [] Adenoids [] Septum [] Sinus  [] Tonsils        [] Other:   [x] None  Comments:     Pulmonary symptoms (from birth to present)?  [] Asthma bronchiale  Inhaler(s)?:   [] Coughing  [] Other  [x] None  Comments:     Environmental and pulmonary symptoms aggravated by?  Season: [] None     [] I     [] II     [] III     [] IV     [] V     [] VI          [] VII     [] VIII     [] IX     [] X     [] XI     [] XII     [] Perennial  Time of Day: [] None     [] Morning     [] Noon     [] Evening     [] Night     [] Whole Day  Location/Changes: [] None     [] Inside     [] Outside     [] Mountain     [] Sea     [] Other:   Triggers (specific): [] None     [x] Animals     [] Dust     [] Mold     [] Plants     [] Other:   Triggers (other): [] None     [] Psyche     [] Sport     [] Work     [] Other:   Irritant: [] None     [] Cold     [] Heat     [] Odors     [] Physical Efforts     [] Smoke     [] Other:     Order for PATCH TESTS  Reason for tests (suspected allergy): not necessary at this time  Known previous allergies: none  Standardized panels  [] Standard panel (40 tests)  [] Preservatives & Antimicrobials (31 tests)  [] Emulsifiers & Additives (25 tests)   [] Perfumes/Flavours & Plants (25 tests)  [] Hairdresser panel (12 tests)  [] Rubber Chemicals (22 tests)  [] Plastics (26 tests)  [] Colorants/Dyes/Food additives (20 tests)  [] Metals (implants/dental) (24 tests)  [] Local anaesthetics/NSAIDs (13 tests)  [] Antibiotics & Antimycotics (14 tests)   [] Corticosteroids (15 tests)   [] Photopatch test (62 tests)   [] Others:     [] Patient's Own Products:   DO NOT test if chemical or biological identity is unknown!   always ask from patient the product information and safety sheets (MSDS)       Order for PRICK TESTS    Reason for tests (suspected allergy): not necessary at this time  Known previous allergies: none    Standardized prick panels  [] Atopic panel (20  tests)  [] Pediatric Panel (12 tests)  [] Milk, Meat, Eggs, Grains (20 tests)   [] Dust, Epithelia, Feathers (10 tests)  [] Fish, Seafood, Shellfish (17 tests)  [] Nuts, Beans (14 tests)  [] Spice, Vegetable, Fruit (17 tests)  [] Pollen Panel = Tree, Grass, Weed (24 tests)  [] Others:     [] Patient's Own Products:   DO NOT test if chemical or biological identity is unknown!   always ask from patient the product information and safety sheets (MSDS)     Standardized intradermal tests  [] Alternaria alternata  [] Aspergillus fumigatus  [] Cladosporium herbarum   [] Penicillium notatum  [] Dermatophagoides farinae  [] Dermatophagoides pteronyssinus  [] Dog Epithelium  [] Cat Epithelium  [] Others:     [] Bee venom   [] Wasp venom  !!Specific protocol with dilutions!!       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    ==> Treatment Plan:    >> Detailed discussion with patient regarding pathophysiology of angioedema in relation to   - Epic  allergy list updated:  - ACE inhibitors added:  - Reaction: angioedema  - Severity: high  - Reaction Type: intolerance  - Comment: Detailed discussion during allergy consult. Lisinopril likely culprit of lip angioedema. Would recommend all ACE inhibitors be avoided, and instead patient be started on calcium antagonist, ARB, diuretic, or as a last resort, ARB.    - Sent direct message via Epic to Dr. Claudia Erickson (PCP) regarding the above.  - Patient must talk with her PCP before discontinuing lisinopril to not cause her blood pressure to become uncontrolled. BP on 11/1/24 was 150/84.  - Encouraged patient to follow up with her PCP tomorrow regarding switching from lisinopril to a different medication.  - Also encouraged her to follow up with specialists, including GI and neurology, as scheduled.    >> If another episode occurs, recommend patient record a 24-hour retrospective diary, noting all diet, medications, activities, etc. to determine possible allergen. Report to clinic via [employer]. If skin reactions, take photos and upload to [employer].  - If she reports to ED, if angioedema is involved, tryptase should be taken. PCP entered standing order in Epic.    >> If rosacea becomes an issue, she should follow up with general dermatology for treatment.    Procedures Performed: None    Staff Involved: Provider, Staff, Medical Student, and Scribe    Scribe Disclosure:   I, SOLEDAD ESCOBAR, am serving as a scribe to document services personally performed by Sacha Berrios MD based on data collection and the provider's statements to me.     Staff Physician Comments:  I was present with the scribe who participated in the documentation of the note. I have verified the history and personally performed the physical exam and medical decision making. I agree with the assessment and plan as documented in the note. I have reviewed and if necessary amended the note.      Also, I saw and evaluated the patient with the resident and I  agree with the assessment and plan as documented in the resident's note.    Sacha Berrios MD  Professor  Head of Dermato-Allergy Division  Department of Dermatology  [facility], [facility]     Follow-up in Derm-Allergy clinic PRN  - scheduled for neurology consult on 11/11/24 and then, per patient, to see GI on 11/13/24    I spent a total of 30 minutes with Nanette Burnett. This time was spent counseling the patient and/or coordinating care, explaining the allergy tests, performing allergy tests and assessing the clinical relevance.

## 2024-11-05 NOTE — TELEPHONE ENCOUNTER
Called pt and relayed provider message below. Patient was given an opportunity to ask questions, verbalized understanding of plan, and is agreeable.    Scheduled pt for VV with Claudia Erickson MD on 11/12/2024    Pt asks if Dr. Berrios thinks she still needs to follow up with neurology and gastroenterology. Reviewed OV notes from pt's visit today with Dr. Berrios but could not find mention of this.    Please call pt back to to inform if follow up with neurology and GI is still warranted.    Routing to provider to review and advise.  Rox PETERS RN

## 2024-11-05 NOTE — TELEPHONE ENCOUNTER
This came in through staff message. Created telephone encounter and routing to  nurse pool.    Rox PETERS RN      ----- Message from Claudia Erickson sent at 11/5/2024 10:59 AM CST -----  Regarding: FW: possible ACE inhibitor induced Angioedema  Can we see if she would like to get scheduled for visit to discuss alternative BP med?    She can do this virtual if she would like  ----- Message -----  From: Sacha Berrios MD  Sent: 11/5/2024  10:10 AM CST  To: Claudia Erickson MD  Subject: possible ACE inhibitor induced Angioedema        Dear Claudia    I think Nanette might have an ACE inhibitor induced Angioedema with GI symtpoms and I would highly recommend to stop the ACE inhibitors. Even after stopping for about 6 months the symptoms may recur. Unfortunately, there is no test to prove that, but it is very likely.  I would give for BP control beta Blocker, Calcium antagonists or diuretics. I would avoid ARB's if possible, but they might be an option (rarely cross-reactions to ACE inhibitors).    Kind regards    Sacha Berrios    Prof Sacha Berrios MD  Head of Dermato-Allergy Division  Jonathan Schwartz Professor in Dermatology  Dept of Dermatology  [address] [address]  [phone]

## 2024-11-07 NOTE — TELEPHONE ENCOUNTER
Provider has viewed (see below)    Opened by: Sacha Berrios MD on Wed Nov 6, 2024 10:34 AM   Doned by: Sacha Berrios MD on Wed Nov 6, 2024 10:34 AM     Closing encounter.    Rox PETERS RN

## 2024-11-11 ENCOUNTER — OFFICE VISIT (OUTPATIENT)
Dept: NEUROLOGY | Facility: CLINIC | Age: 54
End: 2024-11-11
Payer: COMMERCIAL

## 2024-11-11 VITALS — DIASTOLIC BLOOD PRESSURE: 99 MMHG | HEART RATE: 71 BPM | SYSTOLIC BLOOD PRESSURE: 138 MMHG

## 2024-11-11 DIAGNOSIS — R42 DIZZINESS: ICD-10-CM

## 2024-11-11 PROCEDURE — 99205 OFFICE O/P NEW HI 60 MIN: CPT | Performed by: PSYCHIATRY & NEUROLOGY

## 2024-11-11 NOTE — PATIENT INSTRUCTIONS
I think you have some sort of vasoactive response from a medication or possibly an increased amount of serotonin in the body from multiple sources.  I would recommend a urine study for serotonin, and a pharmacy referral.    - MTM referral for serotonergic agent recommendations  - 5HIAA (urine)

## 2024-11-11 NOTE — NURSING NOTE
Chief Complaint   Patient presents with    Consult     Dizziness,  Referred by Tatiana Sawyer PA-C Kena Gemeda, MA on 11/11/2024 at 1:03 PM

## 2024-11-11 NOTE — PROGRESS NOTES
[facility] Neurology Consultation    Nanette Burnett MRN# [id number]   Age: 54 year old YOB: 1970     Requesting physician: Claudia Galarza     Reason for Consultation: dizziness      History of Presenting Symptoms:   Nanette Burnett is a 54 year old female who presents today for evaluation of dizziness. 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    The patient was seen with her PCP 6/19/2024 for issues of transient palpitations with light-headedness. On follow up, 11/1/2024, it was noted she had dizziness (7 episodes since 2019, worsened episodes since 07/2024).  Diarrhea was mentioned as an associated symptom with dizziness and an urge to go to the bathroom.  There was flushing of her face, lips, tongue, and feeling of fatigue reported.    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  She was to reduce lisinopril/cut it out.      Today, the patient has episodes.  Her first event was in 2019, and a few in 2022, and maybe one in 2023.  The events started to cluster more and more in 2024.  Episodes are described as tingling in her lips and tongue (b/l) and bowel a movement.  She may then have light-headedness (even when sitting), which may feel better when laying down.  Her mouth will become parched, dry, pasty. Her face is red, eyes are blood shot, and she has fluctuating heart rates.  She feels pale when all of this is over.  Her arms and fingers have tingling through the whole extremity.  She feels tired after, but isn't necessarily confused.  There isn't LOC.  There isn't atypical motions of the face, arms, legs.     Migraines in her past can occur with aura.  They may happen occasionally, and respond to sumatriptan which always help (over an hour). Migraines are an odd sensation of her head, and pain over the posterior right neck and right  retro-orbital region.  When she takes sumatriptan, her throat may feel weird, and she may feel she gets sensitive to temperatures.      She has no seizure history, no brain trauma, no family history of seizure, no history of a brain infection.      Medications:   Bupropion  Escitalopram  Gabapentin   Hydroxyzine  Lisinopril  Rizatriptan     Physical Exam:   Vitals: BP (!) 138/99 (BP Location: Right arm, Patient Position: Sitting)   Pulse 71    General: Seated comfortably in no acute distress.  Musculoskeletal: mild non-pitting swelling in legs at ankles b/l  Neurologic:     Mental Status: Fully alert, attentive and oriented. Speech clear and fluent, no paraphasic errors.      Cranial Nerves: Visual fields intact. PERRL. EOMI with normal smooth pursuit. Facial sensation intact/symmetric. Facial movements symmetric. Hearing not formally tested but intact to conversation. Palate elevation symmetric, uvula midline. No dysarthria. Shoulder shrug strong bilaterally. Tongue protrusion midline.     Motor: No tremors or other abnormal movements observed. Muscle tone normal throughout. No pronator drift. Normal/symmetric rapid finger tapping. Strength 5/5 throughout upper and lower extremities.     Deep Tendon Reflexes: 2+/symmetric throughout upper and lower extremities. No clonus. Toes downgoing bilaterally.     Sensory: Intact/symmetric to light touch, pinprick, vibration throughout upper and lower extremities. Negative Romberg.      Coordination: Finger-nose-finger and heel-shin intact without dysmetria. Rapid alternating movements intact/symmetric with normal speed and rhythm.     Gait: Normal, steady casual gait. Able to walk on toes, heels and tandem without difficulty.         Data: Pertinent prior to visit   Imaging:  None pertinent         Assessment and Plan:   Assessment:  Carcinoid, or serotonergic toxicity    The patient's swelling of the lips with redness, flushing, diarrhea, and light-headedness sounds like a  systemic response affecting the vascular to some sort of trigger.  I agree with her allergy provider in that angioedema responses to medications should be a focus.  I also think that conditions that may affect serotonin are to be considered, such as carcinoid syndrome or serotonin toxicity of polypharmacy (triptan, escitalopram, bupropion).  There isn't a high seizure risk of her history, and her clinical picture is not consistent with seizure as an etiology.  There is no specific deficits or clinical onset consistent with stroke, vertebral/carotid occlusion/sten osis, TIA, or migraine at this time.     Plan:  - MTM referral for serotonergic agent recommendations  - 5HIAA (urine)  - would recommend GI referral, could be done through PCP     Follow up in Neurology clinic should new concerns arise.    ROSA ELENA Lopez D.O.   of Neurology    Total time today (62 min) in this patient encounter was spent on pre-charting, counseling and/or coordination of care.  The patient is in agreement with this plan and has no further questions.

## 2024-11-11 NOTE — LETTER
11/11/2024      Nanette Burnett  [address] [place] [address]      Dear Colleague,    Thank you for referring your patient, Nanette Burnett, to the [facility]. Please see a copy of my visit note below.    [facility] Neurology Consultation    Nanette Burnett MRN# [id number]   Age: 54 year old YOB: 1970     Requesting physician: Claudia Galarza     Reason for Consultation: dizziness      History of Presenting Symptoms:   Nanette Burnett is a 54 year old female who presents today for evaluation of dizziness. 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    The patient was seen with her PCP 6/19/2024 for issues of transient palpitations with light-headedness. On follow up, 11/1/2024, it was noted she had dizziness (7 episodes since 2019, worsened episodes since 07/2024).  Diarrhea was mentioned as an associated symptom with dizziness and an urge to go to the bathroom.  There was flushing of her face, lips, tongue, and feeling of fatigue reported.    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  She was to reduce lisinopril/cut it out.      Today, the patient has episodes.  Her first event was in 2019, and a few in 2022, and maybe one in 2023.  The events started to cluster more and more in 2024.  Episodes are described as tingling in her lips and tongue (b/l) and bowel a movement.  She may then have light-headedness (even when sitting), which may feel better when laying down.  Her mouth will become parched, dry, pasty. Her face is red, eyes are blood shot, and she has fluctuating heart rates.  She feels pale when all of this is over.  Her arms and fingers have tingling through the whole extremity.  She feels tired after, but isn't necessarily confused.  There isn't LOC.  There isn't atypical motions of  the face, arms, legs.     Migraines in her past can occur with aura.  They may happen occasionally, and respond to sumatriptan which always help (over an hour). Migraines are an odd sensation of her head, and pain over the posterior right neck and right retro-orbital region.  When she takes sumatriptan, her throat may feel weird, and she may feel she gets sensitive to temperatures.      She has no seizure history, no brain trauma, no family history of seizure, no history of a brain infection.      Medications:   Bupropion  Escitalopram  Gabapentin   Hydroxyzine  Lisinopril  Rizatriptan     Physical Exam:   Vitals: BP (!) 138/99 (BP Location: Right arm, Patient Position: Sitting)   Pulse 71    General: Seated comfortably in no acute distress.  Musculoskeletal: mild non-pitting swelling in legs at ankles b/l  Neurologic:     Mental Status: Fully alert, attentive and oriented. Speech clear and fluent, no paraphasic errors.      Cranial Nerves: Visual fields intact. PERRL. EOMI with normal smooth pursuit. Facial sensation intact/symmetric. Facial movements symmetric. Hearing not formally tested but intact to conversation. Palate elevation symmetric, uvula midline. No dysarthria. Shoulder shrug strong bilaterally. Tongue protrusion midline.     Motor: No tremors or other abnormal movements observed. Muscle tone normal throughout. No pronator drift. Normal/symmetric rapid finger tapping. Strength 5/5 throughout upper and lower extremities.     Deep Tendon Reflexes: 2+/symmetric throughout upper and lower extremities. No clonus. Toes downgoing bilaterally.     Sensory: Intact/symmetric to light touch, pinprick, vibration throughout upper and lower extremities. Negative Romberg.      Coordination: Finger-nose-finger and heel-shin intact without dysmetria. Rapid alternating movements intact/symmetric with normal speed and rhythm.     Gait: Normal, steady casual gait. Able to walk on toes, heels and tandem without  difficulty.         Data: Pertinent prior to visit   Imaging:  None pertinent         Assessment and Plan:   Assessment:  Carcinoid, or serotonergic toxicity    The patient's swelling of the lips with redness, flushing, diarrhea, and light-headedness sounds like a systemic response affecting the vascular to some sort of trigger.  I agree with her allergy provider in that angioedema responses to medications should be a focus.  I also think that conditions that may affect serotonin are to be considered, such as carcinoid syndrome or serotonin toxicity of polypharmacy (triptan, escitalopram, bupropion).  There isn't a high seizure risk of her history, and her clinical picture is not consistent with seizure as an etiology.  There is no specific deficits or clinical onset consistent with stroke, vertebral/carotid occlusion/sten osis, TIA, or migraine at this time.     Plan:  - MTM referral for serotonergic agent recommendations  - 5HIAA (urine)  - would recommend GI referral, could be done through PCP     Follow up in Neurology clinic should new concerns arise.    ROSA ELENA Lopez D.O.   of Neurology    Total time today (62 min) in this patient encounter was spent on pre-charting, counseling and/or coordination of care.  The patient is in agreement with this plan and has no further questions.      Again, thank you for allowing me to participate in the care of your patient.        Sincerely,        Jorge A Lopez, DO

## 2024-11-12 ENCOUNTER — VIRTUAL VISIT (OUTPATIENT)
Dept: FAMILY MEDICINE | Facility: CLINIC | Age: 54
End: 2024-11-12
Payer: COMMERCIAL

## 2024-11-12 DIAGNOSIS — T78.3XXD ANGIOEDEMA, SUBSEQUENT ENCOUNTER: ICD-10-CM

## 2024-11-12 DIAGNOSIS — R19.7 DIARRHEA, UNSPECIFIED TYPE: ICD-10-CM

## 2024-11-12 DIAGNOSIS — N95.1 MENOPAUSAL FLUSHING: ICD-10-CM

## 2024-11-12 DIAGNOSIS — I10 HYPERTENSION GOAL BP (BLOOD PRESSURE) < 140/90: Primary | ICD-10-CM

## 2024-11-12 PROCEDURE — 99214 OFFICE O/P EST MOD 30 MIN: CPT | Mod: 95 | Performed by: FAMILY MEDICINE

## 2024-11-12 RX ORDER — AMLODIPINE BESYLATE 5 MG/1
5 TABLET ORAL DAILY
Qty: 30 TABLET | Refills: 2 | Status: SHIPPED | OUTPATIENT
Start: 2024-11-12

## 2024-11-12 NOTE — PROGRESS NOTES
Nanette is a 54 year old who is being evaluated via a billable video visit.    How would you like to obtain your AVS? MyChart  If the video visit is dropped, the invitation should be resent by: Send to e-mail at: [email or web address]  Will anyone else be joining your video visit? No      Assessment & Plan     Hypertension goal BP (blood pressure) < 140/90  Wonder about angioedema  Stop ACEI and start CCB  The potential side effects of the prescription medication were discussed with the patient.  Recheck BP in one month   - amLODIPine (NORVASC) 5 MG tablet  Dispense: 30 tablet; Refill: 2    Angioedema, subsequent encounter  Also wonder about carcinoid syndrome  Refer to GI and get CT and some labs   - CT Abdomen Pelvis w/o Contrast    Diarrhea, unspecified type  ? Carcinoid vs medication side effects.   Advised decrease dose of lexapro  Patient is reluctant at this time    - Adult GI  Referral - Consult Only  - Gastrin  - Vasoactive intestinal peptide  - Serotonin  - CT Abdomen Pelvis w/o Contrast    Menopausal flushing    - Adult GI  Referral - Consult Only  - Gastrin  - Vasoactive intestinal peptide  - Serotonin  - CT Abdomen Pelvis w/o Contrast              CONSULTATION/REFERRAL to GI - has seen neuro and allergy  FUTURE LABS:       - Schedule a non-fasting blood draw in next few days  Nurse BP check in one month  Also having ambulatory BP monitoring right now   FUTURE APPOINTMENTS:       - Follow-up visit in 2-3 months   See Patient Instructions    Subjective   Nanette is a 54 year old, presenting for the following health issues:   she has been seen a few times in the last week by [facility] here and allergy and then neurology yesterday for episodes where her lips start to tingle and her tongue feels tingly and large and then within 30 seconds she has sudden urge to have BM.   She has to hurry to the bathroom and she will have sudden diarrhea.   She often is in bathroom for 30 minutes feeling like her whole  GI tract has cleaned itself out.    She does not have nausea.   She does have pretty significant facial flushing as well.    Her mouth will feel really dry and she feels light headed and exhausted.    These spells come on without warning.   She has had a total of 7 and the last 3 were in the last 3-4 months.    She is on medication for anxiety and her for her blood pressure.   She has been on blood pressure meds since 2017.   She also had her first of these episodes in 2017.   RECHECK (Follow up on series episodes that has been on going since 2019 which are more common recently in July, self and October 2024. Has several sx tingling, diarrhea and more.) and Medication Problem (Discussing replacing lisinopril)        11/12/2024    10:52 AM   Additional Questions   Roomed by Grecia   Accompanied by Self     History of Present Illness       Reason for visit:  Many reasons. More frequent weird episodes that involve bowel movements and sudden onset weakness, tingling on lips, tongue, hands and arms, sweating, dry mouth, diarrhea, red face and eyes, trouble breathing, tight throat, light headed and ending pale  Symptom onset:  More than a month  Symptoms include:  Above  Symptom intensity:  Severe  Symptom progression:  Worsening  Had these symptoms before:  Yes  Has tried/received treatment for these symptoms:  No  What makes it better:  Water as the episode starts to subside. A cold floor to lie on. She is missing 1 dose(s) of medications per week.  She is not taking prescribed medications regularly due to remembering to take.     Past Medical History:   Diagnosis Date    Allergy, unspecified not elsewhere classified     Hayfever, better in the early 2000's    Anxiety state, unspecified     Dysplastic nevus     near toe. another suspicious mole right thigh. sees Derm - both removed    Genital herpes, unspecified     about 2 - 4 flares per year    Hypertension     Impingement syndrome of right shoulder 2/20/2023     Infection due to 2019 novel coronavirus 2021    Insomnia, unspecified     Migraine, unspecified, without mention of intractable migraine without mention of status migrainosus     will have some with aura; has others       Past Surgical History:   Procedure Laterality Date    ABDOMEN SURGERY      ARTHROSCOPY KNEE Right 2016    lateral and medial meniscal tear    C/SECTION, LOW TRANSVERSE  2005    , Low Transverse    C/SECTION, LOW TRANSVERSE  2008    COLONOSCOPY      repeat in 10 years    HERNIORRHAPHY INGUINAL  2012    Procedure: HERNIORRHAPHY INGUINAL;;  Surgeon: Tray Valenzuela MD;  Location: [facility] OR    LAPAROSCOPIC TUBAL LIGATION  2012    Procedure: LAPAROSCOPIC TUBAL LIGATION;  LAPAROSCOPIC TUBAL LIGATION, Right Inguinal Hernia Repair with Mesh ;  Surgeon: Gerson Burrell MD;  Location:  OR    [facility] NONSPECIFIC PROCEDURE  age 6    s/p tonsillectomy & adenoidectomy    [facility] NONSPECIFIC PROCEDURE  1994    L knee surgery ACL-arthroscopic/open    [facility] NONSPECIFIC PROCEDURE  1995    vein stripping x 2    [facility] NONSPECIFIC PROCEDURE  1997    R ACL surgery    [facility] NONSPECIFIC PROCEDURE  1995    s/p L inguinal hernia repair    [facility] NONSPECIFIC PROCEDURE  1998    abnormal pap-TCA    [facility] NONSPECIFIC PROCEDURE  2002    bunion surgery L    [facility] NONSPECIFIC PROCEDURE  2001    Bilateral saphenous vein       MEDICATIONS:  Current Outpatient Medications   Medication Sig Dispense Refill    buPROPion (WELLBUTRIN SR) 150 MG 12 hr tablet Take 1 tablet (150 mg) by mouth at bedtime 90 tablet 4    escitalopram (LEXAPRO) 10 MG tablet Take 1.5 tablets (15 mg) by mouth daily 135 tablet 2    hydrOXYzine HCl (ATARAX) 25 MG tablet TAKE 1 TABLET(25 MG) BY MOUTH EVERY NIGHT AS NEEDED FOR ANXIETY OR SLEEP 30 tablet 11    lisinopril (ZESTRIL) 20 MG tablet TAKE 1 TABLET(20 MG) BY MOUTH DAILY 90 tablet 3    rizatriptan (MAXALT-MLT) 10 MG ODT Take 1 tablet (10  "mg) by mouth at onset of headache for migraine May repeat in 2 hours. Max 3 tablets/24 hours. 12 tablet 3    SUMAtriptan (IMITREX) 50 MG tablet TAKE 1 TABLET(50 MG) BY MOUTH AT ONSET OF HEADACHE FOR MIGRAINE. MAY REPEAT DOSE IN 2 HOURS. DO NOT EXCEED 200 MG IN 24 HOURS 12 tablet 5    gabapentin (NEURONTIN) 100 MG capsule 1 TAB each night for 7 days, then 2 per night for 7 days and then 3 per night 90 capsule 2    valACYclovir (VALTREX) 500 MG tablet TAKE 1 TABLET BY MOUTH TWICE DAILY FOR 3 DAYS WITH FLARES 24 tablet 3     No current facility-administered medications for this visit.       SOCIAL HISTORY:  Social History     Tobacco Use    Smoking status: Never    Smokeless tobacco: Never    Tobacco comments:     occasionally in the past - social   Substance Use Topics    Alcohol use: Yes     Alcohol/week: 0.0 - 1.0 standard drinks of alcohol       Family History   Problem Relation Age of Onset    Cancer Other         Prostate CA    Hypertension Father     Neurologic Disorder Father         ALS    Thyroid Disease Mother         actually hypoglycemia; not thyroid.    Hypertension Mother     Breast Cancer Mother         Was taken care of with radiation    Hypertension Maternal Grandmother     Cerebrovascular Disease Maternal Grandmother     Cancer Maternal Grandmother         ovarian/uterine dx at age 50's    Breast Cancer Paternal Grandmother         dx at age 60's               Review of Systems  Constitutional, HEENT, cardiovascular, pulmonary, gi and gu systems are negative, except as otherwise noted.      Objective    Vitals - Patient Reported  Weight (Patient Reported): 87.1 kg (192 lb)  Height (Patient Reported): 175.3 cm (5' 9\")  BMI (Based on Pt Reported Ht/Wt): 28.35  Pain Score: No Pain (0)        Physical Exam   GENERAL: alert and no distress  EYES: Eyes grossly normal to inspection.  No discharge or erythema, or obvious scleral/conjunctival abnormalities.  RESP: No audible wheeze, cough, or visible " cyanosis.    SKIN: Visible skin clear. No significant rash, abnormal pigmentation or lesions.  NEURO: Cranial nerves grossly intact.  Mentation and speech appropriate for age.  PSYCH: Appropriate affect, tone, and pace of words    Office Visit on 11/01/2024   Component Date Value Ref Range Status    WBC Count 11/01/2024 6.4  4.0 - 11.0 10e3/uL Final    RBC Count 11/01/2024 4.35  3.80 - 5.20 10e6/uL Final    Hemoglobin 11/01/2024 13.4  11.7 - 15.7 g/dL Final    Hematocrit 11/01/2024 40.5  35.0 - 47.0 % Final    MCV 11/01/2024 93  78 - 100 fL Final    MCH 11/01/2024 30.8  26.5 - 33.0 pg Final    MCHC 11/01/2024 33.1  31.5 - 36.5 g/dL Final    RDW 11/01/2024 12.6  10.0 - 15.0 % Final    Platelet Count 11/01/2024 288  150 - 450 10e3/uL Final    Tryptase 11/01/2024 5.1  <11.0 ug/L Final    Sodium 11/01/2024 144  135 - 145 mmol/L Final    Potassium 11/01/2024 4.6  3.4 - 5.3 mmol/L Final    Carbon Dioxide (CO2) 11/01/2024 30 (H)  22 - 29 mmol/L Final    Anion Gap 11/01/2024 8  7 - 15 mmol/L Final    Urea Nitrogen 11/01/2024 9.7  6.0 - 20.0 mg/dL Final    Creatinine 11/01/2024 0.72  0.51 - 0.95 mg/dL Final    GFR Estimate 11/01/2024 >90  >60 mL/min/1.73m2 Final    eGFR calculated using 2021 CKD-EPI equation.    Calcium 11/01/2024 9.3  8.8 - 10.4 mg/dL Final    Reference intervals for this test were updated on 7/16/2024 to reflect our healthy population more accurately. There may be differences in the flagging of prior results with similar values performed with this method. Those prior results can be interpreted in the context of the updated reference intervals.    Chloride 11/01/2024 106  98 - 107 mmol/L Final    Glucose 11/01/2024 89  70 - 99 mg/dL Final    Alkaline Phosphatase 11/01/2024 71  40 - 150 U/L Final    AST 11/01/2024 29  0 - 45 U/L Final    ALT 11/01/2024 25  0 - 50 U/L Final    Protein Total 11/01/2024 7.4  6.4 - 8.3 g/dL Final    Albumin 11/01/2024 4.5  3.5 - 5.2 g/dL Final    Bilirubin Total 11/01/2024 0.3   <=1.2 mg/dL Final         Video-Visit Details    Type of service:  Video Visit   Originating Location (pt. Location): Home    Distant Location (provider location):  On-site  Platform used for Video Visit: Roselia  Signed Electronically by: Claudia Erickson MD

## 2024-11-14 NOTE — PROGRESS NOTES
Medication Therapy Management (MTM) Encounter    ASSESSMENT:                            Medication Adherence/Access: No issues identified.    Dizziness:  Recommend patient complete lab work and imaging. She only takes one serotonergic medication regularly. She rarely takes her triptan for migraines so unlikely to be experiencing serotonin syndrome. Will follow-up with patient once all labs and testing are complete to discuss if any medications need to be changed or adjusted. Patient is agreeable to this plan.     Hypertension   Patient just switched to amlodipine so too soon to assess if medication is effective for blood pressure. She has not had anymore episodes since stopping lisinopril. Discussed the importance of blood pressure control to prevent cardiovascular and renal disease. Discussed goal of less than 130/80 and advised she purchase a home blood pressure cuff to check blood pressure 3-4 times weekly. Her heart rate does elevate above 100 bpm but she is wearing a monitor to look further into this.      Headache   Migraine:   Stable, recommend she continue with as needed rizatriptan use. Recommend she let her primary care provider know if she experiences side effects with rizatriptan. She may need to switch to an oral gepant for acute treatment.      Mental Health   Anxiety, Depression, and Insomnia  Patient reports anxiety symptoms. Recommend she increase her escitalopram to 15mg  which was recommended by her primary care provider back in April. She has been prescribed bupropion SR but is only taking this once daily so the medication could be wearing off later in the day contributing to some of her symptoms. Will reach out to primary care provider to recommend the formulation be changed to the ER (24 hours) formulation.     HSV:   Stable, continue with as needed medication.     PLAN:                            Medication list updated today  Try to buy a blood pressure machine to check your blood pressure  Orthopedic/Sports Medicine Fracture Triage    Incoming call/or message from ortho resident staff message.    Fracture type: Ankle.    The patient is in a  splint.    Date of injury 4/2/22.    Triaged by: Dr. Sorensen .    Determined to be managed Surgically.    Needs to be seen within 1 week.    Additional Comments/information: BELEN Ramirez ATC          "3-4 times a week. Your goal is less than 130/80. These usually check heart rate too with a goal of  beats per minute  I will contact your primary care provider regarding bupropion   Increase your escitalopram to 15mg (1.5 tabs) if you feel you need to.     Follow-up: We can follow-up once all your tests and labs are completed to discuss if any medications need to be changed    SUBJECTIVE/OBJECTIVE:                          Nanette Burnett is a 54 year old female seen for an initial visit. She was referred to me from Dr. Lopez.      Reason for visit: Medication review.    Allergies/ADRs: Reviewed in chart  Past Medical History: Reviewed in chart  Tobacco: She reports that she has never smoked. She has never used smokeless tobacco.  Alcohol: Less than 1 beverage / month    Medication Adherence/Access: no issues reported.      Dizziness:  She calls these \"episodes\" and her last one was two weeks ago on a Tuesday. She use to get about one episode a year but has experienced three this year (July, September, and end of October). She is worried that they are getting closer together.  She has no warning when the episode is going to happen. She describes the symptoms as: her lips and tongue will start to swell and tingle. She will then feel like she needs to have a bowel movement. She will have these at work so she will find a private bathroom to go to. Reports she will experience diarrhea. She will become very weak and lethargic and can barely stand up. Reports that she will experience tingling arms and hands which are newer symptoms this year. Her face will turn very red, eyes are blood shot, and her mouth is very dry. She then will turn pale. She will become over heated and sweating and at the end of the episode will need to lie on a cold floor. These episodes will happen for about an hour. Her throat will feel weird almost like it feels like it will close up and her breathing becomes very rapid. She sometimes " has a hard time catching her breath. She has the urge to throw up but only has happened once.     She did see a allergy specialist who recommended stopping lisinopril due to angioedema.     She also met with her primary care provider who recommended blood work which she will have done today and a CT abdomen pelvis w/o contrast Tuesday evening     Hypertension   -Amlodipine 5mg once daily. This was just changed a few days ago from lisinopril due to angioedema. She has taken two days of the medication.   Patient reports no current medication side effects  Patient does not self-monitor blood pressure.  She is monitoring her heart rate. It can range from 60 to 120. When her resting heart rate is above 100 she doesn't feel it.   She has a monitor that she has been wearing on her chest for over a week to monitor her heart     Headache   Migraine:   Preventive medications  None     Acute medications  -Rizatriptan 10 mg at the onset of a migraine and then repeat in 2 hours if needed   -Advil 200-400mg every 6 hours as needed - infrequent use     Triggers include: Visual stimuli and Weather changes  Patient reports having a migraine once every 3 weeks, she will sometimes have an aura   She had side effects to sumatriptan (tightness in chest, heaviness, warm sensation, flushing, and tingling of throat) so she switched to rizatriptan and has not had these side effects      Mental Health   Anxiety, Depression, and Insomnia  -hydroxyzine 25mg nightly as needed for anxiety and sleep   -Bupropion SR 150mg once daily in the morning   -Escitalopram 10mg once daily. - it was increased to 15mg but has not increased   Patient reports no current medication side effects.  Patient reports symptoms are unchanged. She feels she may need to increase to 15mg due to anxiety.     Previously tried trazodone 50mg but made her feel groggy the next day. She is not sure it even worked. She has        HSV:   -Valacyclovir 500mg twice daily for 3 days  with flare.   No side effects to report today.     Today's Vitals: There were no vitals taken for this visit.  ----------------    I spent 32 minutes with this patient today. I offer these suggestions for consideration by Dr. Lopez. A copy of the visit note was provided to the patient's provider(s).    A summary of these recommendations was sent via [employer].    Rosa Morocho, PharmD, BCACP  Medication Therapy Management Pharmacist   [facility]      Telemedicine Visit Details  The patient's medications can be safely assessed via a telemedicine encounter.  Type of service:  Telephone visit  Originating Location (pt. Location): Home  Distant Location (provider location):  Off-site  Start Time: 9:07 AM  End Time:  9:39 AM     Medication Therapy Recommendations  No medication therapy recommendations to display

## 2024-11-15 ENCOUNTER — VIRTUAL VISIT (OUTPATIENT)
Dept: PHARMACY | Facility: CLINIC | Age: 54
End: 2024-11-15
Attending: PSYCHIATRY & NEUROLOGY
Payer: COMMERCIAL

## 2024-11-15 ENCOUNTER — LAB (OUTPATIENT)
Dept: LAB | Facility: CLINIC | Age: 54
End: 2024-11-15
Payer: COMMERCIAL

## 2024-11-15 DIAGNOSIS — F41.1 ANXIETY STATE: ICD-10-CM

## 2024-11-15 DIAGNOSIS — G43.109 MIGRAINE WITH AURA AND WITHOUT STATUS MIGRAINOSUS, NOT INTRACTABLE: ICD-10-CM

## 2024-11-15 DIAGNOSIS — B00.9 HERPES SIMPLEX VIRUS (HSV) INFECTION: ICD-10-CM

## 2024-11-15 DIAGNOSIS — N95.1 MENOPAUSAL FLUSHING: ICD-10-CM

## 2024-11-15 DIAGNOSIS — I10 BENIGN ESSENTIAL HYPERTENSION: ICD-10-CM

## 2024-11-15 DIAGNOSIS — R19.7 DIARRHEA, UNSPECIFIED TYPE: ICD-10-CM

## 2024-11-15 DIAGNOSIS — R42 DIZZINESS: Primary | ICD-10-CM

## 2024-11-15 PROCEDURE — 84586 ASSAY OF VIP: CPT | Mod: 90

## 2024-11-15 PROCEDURE — 82941 ASSAY OF GASTRIN: CPT | Mod: 90

## 2024-11-15 PROCEDURE — 84260 ASSAY OF SEROTONIN: CPT | Mod: 90

## 2024-11-15 PROCEDURE — 36415 COLL VENOUS BLD VENIPUNCTURE: CPT

## 2024-11-15 PROCEDURE — 99000 SPECIMEN HANDLING OFFICE-LAB: CPT

## 2024-11-15 RX ORDER — VALACYCLOVIR HYDROCHLORIDE 500 MG/1
500 TABLET, FILM COATED ORAL 2 TIMES DAILY
COMMUNITY

## 2024-11-15 RX ORDER — MULTIVITAMIN,THERAPEUTIC
1 TABLET ORAL DAILY
COMMUNITY

## 2024-11-15 NOTE — Clinical Note
Kartik Erickson,  I had the pleasure of meeting with Nanette for MTM medication review to see if any of her medications could be contributing to her symptoms. One medication I want to suggest adjusting is her bupropion. She is taking the SR formulation which has a duration of about 12 hours. She is taking it once daily so the medication could be wearing off later in the day. Please consider adjusting this medication to bupropion XL 150mg which is the 24 hour formulation. Thanks!  Rosa Morocho, DenizD, BCACP Medication Therapy Management Pharmacist  [facility]

## 2024-11-15 NOTE — PATIENT INSTRUCTIONS
"Recommendations from today's MTM visit:                                                    Today we reviewed what your medicines are for, how to know if they are working, that your medicines are safe and how to make your medicine regimen as easy as possible.      Medication list updated today  Try to buy a blood pressure machine to check your blood pressure 3-4 times a week. Your goal is less than 130/80. These usually check heart rate too with a goal of  beats per minute  I will contact your primary care provider regarding bupropion   Increase your escitalopram to 15mg (1.5 tabs) if you feel you need to.     Follow-up: We can follow-up once all your tests and labs are completed to discuss if any medications need to be changed    It was great speaking with you today.  I value your experience and would be very thankful for your time in providing feedback in our clinic survey. In the next few days, you may receive an email or text message from [employer] with a link to a survey related to your  clinical pharmacist.\"     To schedule another MTM appointment, please call the clinic directly or you may call the MTM scheduling line at [phone] or toll-free at [phone].     My Clinical Pharmacist's contact information:                                                      Please feel free to contact me with any questions or concerns you have.      Rosa Mroocho, PharmD, BCACP  Medication Therapy Management Pharmacist   [facility]     "

## 2024-11-17 LAB — GASTRIN SERPL-MCNC: 31 PG/ML

## 2024-11-18 DIAGNOSIS — F41.1 ANXIETY STATE: Primary | ICD-10-CM

## 2024-11-18 LAB — VIP SERPL-MCNC: 46 PG/ML

## 2024-11-18 RX ORDER — ESCITALOPRAM OXALATE 10 MG/1
10 TABLET ORAL DAILY
Status: SHIPPED
Start: 2024-11-18

## 2024-11-18 RX ORDER — BUPROPION HYDROCHLORIDE 150 MG/1
150 TABLET ORAL EVERY MORNING
Qty: 90 TABLET | Refills: 1 | Status: SHIPPED | OUTPATIENT
Start: 2024-11-18

## 2024-11-18 NOTE — TELEPHONE ENCOUNTER
"Dr. Erickson- Pt reports she would like to try change in Bupropion.     Pended medication below. Please review and order, if appropriate.     Called and spoke with pt,     See above.     \"Can you see if the patient is okay trying this?  If so I can order it for her.\"    ----- Message -----  From: Rosa Morocho, [facility]  Sent: 11/15/2024  10:59 AM CST  To: Claudia Erickson MD    \"Hello Dr. Erickson,    I had the pleasure of meeting with Nanette for MTM medication review to see if any of her medications could be contributing to her symptoms. One medication I want to suggest adjusting is her bupropion. She is taking the SR formulation which has a duration of about 12 hours. She is taking it once daily so the medication could be wearing off later in the day. Please consider adjusting this medication to bupropion XL 150mg which is the 24 hour formulation. Thanks!    Rosa Morocho, PharmD, BCACP  Medication Therapy Management Pharmacist  [facility]\"    Routed to PCP    Pushpa ARGUELLES RN   Clinic RN  [facility]    "

## 2024-11-19 ENCOUNTER — MYC MEDICAL ADVICE (OUTPATIENT)
Dept: FAMILY MEDICINE | Facility: CLINIC | Age: 54
End: 2024-11-19
Payer: COMMERCIAL

## 2024-11-19 NOTE — TELEPHONE ENCOUNTER
See mychart-  -Patient scheduled with GI as recommended  -RN advised patient schedule follow-up bp check and follow-up with PCP.       Per VV note 11/12/24:  Assessment & Plan  Hypertension goal BP (blood pressure) < 140/90  Wonder about angioedema  Stop ACEI and start CCB  The potential side effects of the prescription medication were discussed with the patient.  Recheck BP in one month   - amLODIPine (NORVASC) 5 MG tablet  Dispense: 30 tablet; Refill: 2     Angioedema, subsequent encounter  Also wonder about carcinoid syndrome  Refer to GI and get CT and some labs   - CT Abdomen Pelvis w/o Contrast     Diarrhea, unspecified type  ? Carcinoid vs medication side effects.   Advised decrease dose of lexapro  Patient is reluctant at this time     - Adult GI  Referral - Consult Only  - Gastrin  - Vasoactive intestinal peptide  - Serotonin  - CT Abdomen Pelvis w/o Contrast  CONSULTATION/REFERRAL to GI - has seen neuro and allergy  FUTURE LABS:       - Schedule a non-fasting blood draw in next few days  Nurse BP check in one month  Also having ambulatory BP monitoring right now   FUTURE APPOINTMENTS:       - Follow-up visit in 2-3 months   See Patient Instructions    LETICIA Multani, RN  [facility]

## 2024-11-20 LAB — SEROTONIN SER-MCNC: 14 NG/ML

## 2024-11-21 LAB
5HIAA & CREATININE UR-IMP: ABNORMAL
5OH-INDOLEACETATE 24H UR-MCNC: 1.3 MG/L
5OH-INDOLEACETATE 24H UR-MRATE: 4 MG/D
5OH-INDOLEACETATE/CREAT 24H UR: 3 MG/GCR
CREAT 24H UR-MRATE: 1500 MG/D
CREAT UR-MCNC: 50 MG/DL
CV ZIO PRELIM RESULTS: NORMAL

## 2024-11-22 ENCOUNTER — TRANSFERRED RECORDS (OUTPATIENT)
Dept: HEALTH INFORMATION MANAGEMENT | Facility: CLINIC | Age: 54
End: 2024-11-22

## 2024-12-03 ENCOUNTER — HOSPITAL ENCOUNTER (OUTPATIENT)
Dept: CT IMAGING | Facility: CLINIC | Age: 54
Discharge: HOME | End: 2024-12-03
Attending: FAMILY MEDICINE
Payer: COMMERCIAL

## 2024-12-03 DIAGNOSIS — R19.7 DIARRHEA, UNSPECIFIED TYPE: ICD-10-CM

## 2024-12-03 DIAGNOSIS — T78.3XXD ANGIOEDEMA, SUBSEQUENT ENCOUNTER: ICD-10-CM

## 2024-12-03 DIAGNOSIS — N95.1 MENOPAUSAL FLUSHING: ICD-10-CM

## 2024-12-03 PROCEDURE — 74176 CT ABD & PELVIS W/O CONTRAST: CPT

## 2024-12-04 ENCOUNTER — TELEPHONE (OUTPATIENT)
Dept: FAMILY MEDICINE | Facility: CLINIC | Age: 54
End: 2024-12-04
Payer: COMMERCIAL

## 2024-12-04 DIAGNOSIS — R93.2 ABNORMAL CT OF LIVER: ICD-10-CM

## 2024-12-04 DIAGNOSIS — N20.0 KIDNEY STONE: Primary | ICD-10-CM

## 2024-12-04 NOTE — TELEPHONE ENCOUNTER
Outgoing call to patient. Notified of results below. Patient was given an opportunity to ask questions, verbalized understanding, and is agreeable to MRI and urology referral. Please forward back to RN team when orders are placed so we can let them know to call to schedule.  Pended referral, but did not pend MRI as I don't know if you wanted to do contrast or not.    Shital Sawyer, RN on 12/4/2024 at 2:08 PM

## 2024-12-04 NOTE — TELEPHONE ENCOUNTER
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    There also appears to be a tiny kidney stone here not causing problems.   I would like to refer her to urology to see what they think (if any further evaluation needs to be done).   Please let me know when she has been made aware and then I will order the MRI of the liver and order the urology referral

## 2024-12-04 NOTE — TELEPHONE ENCOUNTER
Claudia Erickson MD  [facility]14 minutes ago (4:40 PM)     ROBBIE  Orders placed       Outgoing call to patient.  Provided number for scheduling -[phone].  Provided number for scheduling urology referral [phone].  Patient was given an opportunity to ask questions, verbalized understanding, and is agreeable to call to schedule.    Shital Sawyer RN on 12/4/2024 at 4:59 PM

## 2024-12-09 ENCOUNTER — VIRTUAL VISIT (OUTPATIENT)
Dept: UROLOGY | Facility: CLINIC | Age: 54
End: 2024-12-09
Attending: FAMILY MEDICINE
Payer: COMMERCIAL

## 2024-12-09 DIAGNOSIS — Q62.5 DUPLICATED LEFT RENAL COLLECTING SYSTEM: ICD-10-CM

## 2024-12-09 DIAGNOSIS — Q62.2 CONGENITAL MEGAURETER: ICD-10-CM

## 2024-12-09 DIAGNOSIS — N20.0 KIDNEY STONE: Primary | ICD-10-CM

## 2024-12-09 PROCEDURE — 99203 OFFICE O/P NEW LOW 30 MIN: CPT | Mod: 95 | Performed by: STUDENT IN AN ORGANIZED HEALTH CARE EDUCATION/TRAINING PROGRAM

## 2024-12-09 NOTE — PROGRESS NOTES
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 No personal history of stones. No known  stone history in family but maybe her father    Did not have any UTI as a child but then in teenage years began having recurrent UTI, especially post coital which went into adulthood. Sometimes would have left flank pain with these UTI as well. Saw Dr. Rizo in , who at that time did not do any imaging and recommended conservative management, return as needed    She did supposedly see a urologist about 10 years ago.    She denies urinary incontinence    PAST MEDICAL HISTORY:   Past Medical History:   Diagnosis Date    Allergy, unspecified not elsewhere classified     Hayfever, better in the early     Anxiety state, unspecified     Dysplastic nevus     near toe. another suspicious mole right thigh. sees Derm - both removed    Genital herpes, unspecified     about 2 - 4 flares per year    Hypertension     Impingement syndrome of right shoulder 2023    Infection due to 2019 novel coronavirus 2021    Insomnia, unspecified     Migraine, unspecified, without mention of intractable migraine without mention of status migrainosus     will have some with aura; has others       PAST SURGICAL HISTORY:   Past Surgical History:   Procedure Laterality Date    ABDOMEN SURGERY      ARTHROSCOPY KNEE Right 2016    lateral and medial meniscal tear    C/SECTION, LOW TRANSVERSE  2005    , Low Transverse    C/SECTION, LOW TRANSVERSE  2008    " COLONOSCOPY  2022    repeat in 10 years    HERNIORRHAPHY INGUINAL  08/01/2012    Procedure: HERNIORRHAPHY INGUINAL;;  Surgeon: Tray Valenzuela MD;  Location: [facility] OR    LAPAROSCOPIC TUBAL LIGATION  08/01/2012    Procedure: LAPAROSCOPIC TUBAL LIGATION;  LAPAROSCOPIC TUBAL LIGATION, Right Inguinal Hernia Repair with Mesh ;  Surgeon: Gerson Burrell MD;  Location: [facility] OR    [facility] NONSPECIFIC PROCEDURE  age 6    s/p tonsillectomy & adenoidectomy    [facility] NONSPECIFIC PROCEDURE  06/01/1994    L knee surgery ACL-arthroscopic/open    [facility] NONSPECIFIC PROCEDURE  08/01/1995    vein stripping x 2    [facility] NONSPECIFIC PROCEDURE  08/01/1997    R ACL surgery    [facility] NONSPECIFIC PROCEDURE  01/01/1995    s/p L inguinal hernia repair    [facility] NONSPECIFIC PROCEDURE  08/01/1998    abnormal pap-TCA    [facility] NONSPECIFIC PROCEDURE  07/01/2002    bunion surgery L    [facility] NONSPECIFIC PROCEDURE  07/01/2001    Bilateral saphenous vein       FAMILY HISTORY:   Family History   Problem Relation Age of Onset    Cancer Other         Prostate CA    Hypertension Father     Neurologic Disorder Father         ALS    Thyroid Disease Mother         actually hypoglycemia; not thyroid.    Hypertension Mother     Breast Cancer Mother         Was taken care of with radiation    Hypertension Maternal Grandmother     Cerebrovascular Disease Maternal Grandmother     Cancer Maternal Grandmother         ovarian/uterine dx at age 50's    Breast Cancer Paternal Grandmother         dx at age 60's       SOCIAL HISTORY:   Social History     Tobacco Use    Smoking status: Never    Smokeless tobacco: Never    Tobacco comments:     occasionally in the past - social   Substance Use Topics    Alcohol use: Yes     Alcohol/week: 0.0 - 1.0 standard drinks of alcohol          Allergies   Allergen Reactions    Ace Inhibitors Angioedema     11/5/24 Dr. Sacha Berrios: Detailed discussion during allergy consult. Lisinopril likely culprit of lip angioedema. Would recommend all ACE  inhibitors be avoided, and instead patient be started on calcium antagonist, ARB, diuretic, or as a last resort, ARB.         Current Outpatient Medications:     amLODIPine (NORVASC) 5 MG tablet, Take 1 tablet (5 mg) by mouth daily., Disp: 30 tablet, Rfl: 2    buPROPion (WELLBUTRIN XL) 150 MG 24 hr tablet, Take 1 tablet (150 mg) by mouth every morning., Disp: 90 tablet, Rfl: 1    escitalopram (LEXAPRO) 10 MG tablet, Take 1 tablet (10 mg) by mouth daily., Disp: , Rfl:     hydrOXYzine HCl (ATARAX) 25 MG tablet, TAKE 1 TABLET(25 MG) BY MOUTH EVERY NIGHT AS NEEDED FOR ANXIETY OR SLEEP, Disp: 30 tablet, Rfl: 11    multivitamin, therapeutic (THERA-VIT) TABS tablet, Take 1 tablet by mouth daily., Disp: , Rfl:     rizatriptan (MAXALT-MLT) 10 MG ODT, Take 1 tablet (10 mg) by mouth at onset of headache for migraine May repeat in 2 hours. Max 3 tablets/24 hours., Disp: 12 tablet, Rfl: 3    valACYclovir (VALTREX) 500 MG tablet, Take 500 mg by mouth 2 times daily. For 3 days with flare, Disp: , Rfl:     Review Of Systems:  Skin: No rash, pruritis, or skin pigmentation  Eyes: No changes in vision  Ears/Nose/Throat: No changes in hearing, no nosebleeds  Respiratory: No shortness of breath, dyspnea on exertion, cough, or hemoptysis  Cardiovascular: No chest pain or palpitations  Gastrointestinal: No diarrhea or constipation. No abdominal pain. No hematochezia  Genitourinary: see HPI  Musculoskeletal: No pain or swelling of joints, normal range of motion  Neurologic: No weakness or tremors  Psychiatric: No recent changes in memory or mood  Hematologic/Lymphatic/Immunologic: No easy bruising or enlarged lymph nodes  Endocrine: No weight gain or loss      PHYSICAL EXAM:    There were no vitals taken for this visit.  General appearance: In NAD, conversant  HEENT: Normocephalic and atraumatic, anicteric sclera  Cardiovascular: Not examined  Respiratory: normal, non-labored breathing  Gastrointestinal: negative, Abdomen soft, non-tender,  and non-distended.   Musculoskeletal: Not Examined  Peripheral Vascular/extremity: No peripheral edema  Skin: Normal temperature, turgor, and texture. No rash  Psychiatric: Appropriate affect, alert and oriented to person, place, and time      Cystoscopy: Not done      UA RESULTS:  Recent Labs   Lab Test 04/03/24  1039 11/15/16  1454   COLOR Yellow Yellow   APPEARANCE Clear Slightly Cloudy   URINEGLC Negative Negative   URINEBILI Negative Negative   URINEKETONE Negative Negative   SG 1.015 1.020   UBLD Negative Small*   URINEPH 7.0 5.5   PROTEIN Negative Negative   UROBILINOGEN 0.2 0.2   NITRITE Negative Negative   LEUKEST Negative Large*   RBCU  --  O - 2   WBCU  --  *       Bladder Scan:     Other Labs:      Imaging Studies:     Ct non con 12/3/2024    IMPRESSION:   1.  Masslike focus in the right superior posterior medial liver measuring 5.4 x 5.4 x 4.1 cm. This could represent focal fatty infiltration the liver. Further characterization with nonemergent MRI recommended.  2.  Malrotated left kidney with a dilated tubular structure extending out of the upper pole may represent a duplicated ureter. This structure is dilated down to the level of the urinary bladder. Clinical correlation for any associated symptomatology. No   other renal calculi noted.      Small left upper pole moiety stone        Dilated left upper pole moiety ureter which also appears dilated near the bladder          I drew a diagram for the patient and discussed the findings    Component      Latest Ref Rng 11/1/2024  3:04 PM   Sodium      135 - 145 mmol/L 144    Potassium      3.4 - 5.3 mmol/L 4.6    Carbon Dioxide (CO2)      22 - 29 mmol/L 30 (H)    Anion Gap      7 - 15 mmol/L 8    Urea Nitrogen      6.0 - 20.0 mg/dL 9.7    Creatinine      0.51 - 0.95 mg/dL 0.72    GFR Estimate      >60 mL/min/1.73m2 >90    Calcium      8.8 - 10.4 mg/dL 9.3    Chloride      98 - 107 mmol/L 106    Glucose      70 - 99 mg/dL 89    Alkaline Phosphatase       40 - 150 U/L 71    AST      0 - 45 U/L 29    ALT      0 - 50 U/L 25    Protein Total      6.4 - 8.3 g/dL 7.4    Albumin      3.5 - 5.2 g/dL 4.5    Bilirubin Total      <=1.2 mg/dL 0.3       Legend:  (H) High    CLINICAL IMPRESSION:   53 yo F with duplicated left renal collecting system and dilated upper pole ureter, malrotated kidney with small  stone    She is not having any current symptoms. Denies flank pain.    Discussed the pathophysiology of duplicated collecting system. 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 She is no longer having recurrent uti (last was about 3 years ago) so possibly the reflux has now stopped. Patient is not inclined to get a catheter or cystoscopy awake at this time for further evaluation (this is academic, and probably wouldn't , which is observation given lack of symptoms).    Small upper pole kidney stone highly unlikely to ever obstruct the massively dilated ureter so would observe this    PLAN:   No acute urologic intervention  Return 1 year with ct a/p w/o contrast to monitor stone burden  Return to urology earlier if develops uti or flank pain      Santiago Gutiérrez MD   [facility]  [phone] clinic phone        Virtual Visit Details    Type of service:  Video Visit     Originating Location (pt. Location): Home    Distant Location (provider location):  On-site  Platform used for Video Visit: [employer]

## 2024-12-09 NOTE — LETTER
"12/9/2024       RE: Nanette Burnett  [address] [place] [address]     Dear Colleague,    Thank you for referring your patient, Nanette Burnett, to the [facility] [place] [facility]. Please see a copy of my visit note below.    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 No personal history of stones. No known  stone history in family but maybe her father    Did not have any UTI as a child but then in teenage years began having recurrent UTI, especially post coital which went into adulthood. Sometimes would have left flank pain with these UTI as well. Saw Dr. Rizo in 2013, who at that time did not do any imaging and recommended conservative management, return as needed    She did supposedly see a urologist about 10 years ago.    She denies urinary incontinence    PAST MEDICAL HISTORY:   Past Medical History:   Diagnosis Date     Allergy, unspecified not elsewhere classified     Hayfever, better in the early 2000's     Anxiety state, unspecified      Dysplastic nevus     near toe. another suspicious mole right thigh. sees Derm - both removed     Genital herpes, unspecified     about 2 - 4 flares per year     Hypertension      Impingement syndrome of right shoulder 2/20/2023     Infection due to 2019 novel coronavirus 11/2021     Insomnia, unspecified      Migraine, unspecified, without mention of intractable migraine without mention of status migrainosus     will have some with " aura; has others       PAST SURGICAL HISTORY:   Past Surgical History:   Procedure Laterality Date     ABDOMEN SURGERY       ARTHROSCOPY KNEE Right 2016    lateral and medial meniscal tear     C/SECTION, LOW TRANSVERSE  2005    , Low Transverse     C/SECTION, LOW TRANSVERSE  2008     COLONOSCOPY      repeat in 10 years     HERNIORRHAPHY INGUINAL  2012    Procedure: HERNIORRHAPHY INGUINAL;;  Surgeon: Tray Valenzuela MD;  Location: [facility] OR     LAPAROSCOPIC TUBAL LIGATION  2012    Procedure: LAPAROSCOPIC TUBAL LIGATION;  LAPAROSCOPIC TUBAL LIGATION, Right Inguinal Hernia Repair with Mesh ;  Surgeon: Gerson Burrell MD;  Location: [facility] OR     [facility] NONSPECIFIC PROCEDURE  age 6    s/p tonsillectomy & adenoidectomy     [facility] NONSPECIFIC PROCEDURE  1994    L knee surgery ACL-arthroscopic/open     [facility] NONSPECIFIC PROCEDURE  1995    vein stripping x 2     [facility] NONSPECIFIC PROCEDURE  1997    R ACL surgery     [facility] NONSPECIFIC PROCEDURE  1995    s/p L inguinal hernia repair     [facility] NONSPECIFIC PROCEDURE  1998    abnormal pap-TCA     [facility] NONSPECIFIC PROCEDURE  2002    bunion surgery L     [facility] NONSPECIFIC PROCEDURE  2001    Bilateral saphenous vein       FAMILY HISTORY:   Family History   Problem Relation Age of Onset     Cancer Other         Prostate CA     Hypertension Father      Neurologic Disorder Father         ALS     Thyroid Disease Mother         actually hypoglycemia; not thyroid.     Hypertension Mother      Breast Cancer Mother         Was taken care of with radiation     Hypertension Maternal Grandmother      Cerebrovascular Disease Maternal Grandmother      Cancer Maternal Grandmother         ovarian/uterine dx at age 50's     Breast Cancer Paternal Grandmother         dx at age 60's       SOCIAL HISTORY:   Social History     Tobacco Use     Smoking status: Never     Smokeless tobacco: Never     Tobacco comments:     occasionally  in the past - social   Substance Use Topics     Alcohol use: Yes     Alcohol/week: 0.0 - 1.0 standard drinks of alcohol          Allergies   Allergen Reactions     Ace Inhibitors Angioedema     11/5/24 Dr. Sacha Berrios: Detailed discussion during allergy consult. Lisinopril likely culprit of lip angioedema. Would recommend all ACE inhibitors be avoided, and instead patient be started on calcium antagonist, ARB, diuretic, or as a last resort, ARB.         Current Outpatient Medications:      amLODIPine (NORVASC) 5 MG tablet, Take 1 tablet (5 mg) by mouth daily., Disp: 30 tablet, Rfl: 2     buPROPion (WELLBUTRIN XL) 150 MG 24 hr tablet, Take 1 tablet (150 mg) by mouth every morning., Disp: 90 tablet, Rfl: 1     escitalopram (LEXAPRO) 10 MG tablet, Take 1 tablet (10 mg) by mouth daily., Disp: , Rfl:      hydrOXYzine HCl (ATARAX) 25 MG tablet, TAKE 1 TABLET(25 MG) BY MOUTH EVERY NIGHT AS NEEDED FOR ANXIETY OR SLEEP, Disp: 30 tablet, Rfl: 11     multivitamin, therapeutic (THERA-VIT) TABS tablet, Take 1 tablet by mouth daily., Disp: , Rfl:      rizatriptan (MAXALT-MLT) 10 MG ODT, Take 1 tablet (10 mg) by mouth at onset of headache for migraine May repeat in 2 hours. Max 3 tablets/24 hours., Disp: 12 tablet, Rfl: 3     valACYclovir (VALTREX) 500 MG tablet, Take 500 mg by mouth 2 times daily. For 3 days with flare, Disp: , Rfl:     Review Of Systems:  Skin: No rash, pruritis, or skin pigmentation  Eyes: No changes in vision  Ears/Nose/Throat: No changes in hearing, no nosebleeds  Respiratory: No shortness of breath, dyspnea on exertion, cough, or hemoptysis  Cardiovascular: No chest pain or palpitations  Gastrointestinal: No diarrhea or constipation. No abdominal pain. No hematochezia  Genitourinary: see HPI  Musculoskeletal: No pain or swelling of joints, normal range of motion  Neurologic: No weakness or tremors  Psychiatric: No recent changes in memory or mood  Hematologic/Lymphatic/Immunologic: No easy bruising or  enlarged lymph nodes  Endocrine: No weight gain or loss      PHYSICAL EXAM:    There were no vitals taken for this visit.  General appearance: In NAD, conversant  HEENT: Normocephalic and atraumatic, anicteric sclera  Cardiovascular: Not examined  Respiratory: normal, non-labored breathing  Gastrointestinal: negative, Abdomen soft, non-tender, and non-distended.   Musculoskeletal: Not Examined  Peripheral Vascular/extremity: No peripheral edema  Skin: Normal temperature, turgor, and texture. No rash  Psychiatric: Appropriate affect, alert and oriented to person, place, and time      Cystoscopy: Not done      UA RESULTS:  Recent Labs   Lab Test 04/03/24  1039 11/15/16  1454   COLOR Yellow Yellow   APPEARANCE Clear Slightly Cloudy   URINEGLC Negative Negative   URINEBILI Negative Negative   URINEKETONE Negative Negative   SG 1.015 1.020   UBLD Negative Small*   URINEPH 7.0 5.5   PROTEIN Negative Negative   UROBILINOGEN 0.2 0.2   NITRITE Negative Negative   LEUKEST Negative Large*   RBCU  --  O - 2   WBCU  --  *       Bladder Scan:     Other Labs:      Imaging Studies:     Ct non con 12/3/2024    IMPRESSION:   1.  Masslike focus in the right superior posterior medial liver measuring 5.4 x 5.4 x 4.1 cm. This could represent focal fatty infiltration the liver. Further characterization with nonemergent MRI recommended.  2.  Malrotated left kidney with a dilated tubular structure extending out of the upper pole may represent a duplicated ureter. This structure is dilated down to the level of the urinary bladder. Clinical correlation for any associated symptomatology. No   other renal calculi noted.      Small left upper pole moiety stone        Dilated left upper pole moiety ureter which also appears dilated near the bladder          I drew a diagram for the patient and discussed the findings    Component      Latest Ref Rng 11/1/2024  3:04 PM   Sodium      135 - 145 mmol/L 144    Potassium      3.4 - 5.3 mmol/L 4.6     Carbon Dioxide (CO2)      22 - 29 mmol/L 30 (H)    Anion Gap      7 - 15 mmol/L 8    Urea Nitrogen      6.0 - 20.0 mg/dL 9.7    Creatinine      0.51 - 0.95 mg/dL 0.72    GFR Estimate      >60 mL/min/1.73m2 >90    Calcium      8.8 - 10.4 mg/dL 9.3    Chloride      98 - 107 mmol/L 106    Glucose      70 - 99 mg/dL 89    Alkaline Phosphatase      40 - 150 U/L 71    AST      0 - 45 U/L 29    ALT      0 - 50 U/L 25    Protein Total      6.4 - 8.3 g/dL 7.4    Albumin      3.5 - 5.2 g/dL 4.5    Bilirubin Total      <=1.2 mg/dL 0.3       Legend:  (H) High    CLINICAL IMPRESSION:   53 yo F with duplicated left renal collecting system and dilated upper pole ureter, malrotated kidney with small  stone    She is not having any current symptoms. Denies flank pain.    Discussed the pathophysiology of duplicated collecting system. 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 She is no longer having recurrent uti (last was about 3 years ago) so possibly the reflux has now stopped. Patient is not inclined to get a catheter or cystoscopy awake at this time for further evaluation (this is academic, and probably wouldn't , which is observation given lack of symptoms).    Small upper pole kidney stone highly unlikely to ever obstruct the massively dilated ureter so would observe this    PLAN:   No acute urologic intervention  Return 1 year with ct a/p w/o contrast to monitor stone burden  Return to urology earlier if develops uti or flank pain      Santiago Gutiérrez MD   [facility]  [phone] clinic phone        Virtual Visit Details    Type of service:  Video Visit     Originating Location (pt. Location): Home    Distant Location (provider location):  On-site  Platform used for Video Visit: AmWell      Again, thank you for allowing me to  participate in the care of your patient.      Sincerely,    Santiago Gutiérrez MD

## 2024-12-09 NOTE — NURSING NOTE
Current patient location:  [place]    Is the patient currently in the state of [place]? YES    Visit mode:VIDEO    If the visit is dropped, the patient can be reconnected by:VIDEO VISIT: Text to cell phone:   Telephone Information:   Mobile [phone]       Will anyone else be joining the visit? NO  (If patient encounters technical issues they should call [phone] :[phone])    Are changes needed to the allergy or medication list? No    Are refills needed on medications prescribed by this physician? NO    Rooming Documentation:  Questionnaire(s) completed    Reason for visit: Video Visit and RECHECK    Brenda ENRIQUEZ

## 2024-12-10 ENCOUNTER — MYC REFILL (OUTPATIENT)
Dept: FAMILY MEDICINE | Facility: CLINIC | Age: 54
End: 2024-12-10
Payer: COMMERCIAL

## 2024-12-10 DIAGNOSIS — B00.9 HERPES SIMPLEX VIRUS (HSV) INFECTION: Primary | ICD-10-CM

## 2024-12-11 RX ORDER — VALACYCLOVIR HYDROCHLORIDE 500 MG/1
500 TABLET, FILM COATED ORAL 2 TIMES DAILY
Qty: 12 TABLET | Refills: 3 | Status: SHIPPED | OUTPATIENT
Start: 2024-12-11

## 2024-12-11 NOTE — TELEPHONE ENCOUNTER
Clinic RN: Please investigate patient's chart or contact patient if the information cannot be found because the medication is listed as historical or discontinued. Confirm patient is taking this medication. Document findings and route refill encounter to provider for approval or denial.  Belkis Ramos RN, BSN  [facility]    
Per chart review, medication has been prescribed by PCP in the past on routine basis. Routing to provider to review and approve.     Celso JIMENEZ RN 12/11/2024 at 9:09 AM    
medium/soft

## 2024-12-22 ENCOUNTER — HOSPITAL ENCOUNTER (OUTPATIENT)
Dept: MRI IMAGING | Facility: CLINIC | Age: 54
Discharge: HOME | End: 2024-12-22
Attending: FAMILY MEDICINE | Admitting: FAMILY MEDICINE
Payer: COMMERCIAL

## 2024-12-22 DIAGNOSIS — R93.2 ABNORMAL CT OF LIVER: ICD-10-CM

## 2024-12-22 PROCEDURE — 255N000002 HC RX 255 OP 636: Performed by: FAMILY MEDICINE

## 2024-12-22 PROCEDURE — A9585 GADOBUTROL INJECTION: HCPCS | Performed by: FAMILY MEDICINE

## 2024-12-22 PROCEDURE — 74183 MRI ABD W/O CNTR FLWD CNTR: CPT

## 2024-12-22 RX ORDER — GADOBUTROL 604.72 MG/ML
9 INJECTION INTRAVENOUS ONCE
Status: COMPLETED | OUTPATIENT
Start: 2024-12-22 | End: 2024-12-22

## 2024-12-22 RX ADMIN — GADOBUTROL 9 ML: 604.72 INJECTION INTRAVENOUS at 16:11

## 2024-12-24 ENCOUNTER — OFFICE VISIT (OUTPATIENT)
Dept: FAMILY MEDICINE | Facility: CLINIC | Age: 54
End: 2024-12-24
Payer: COMMERCIAL

## 2024-12-24 VITALS
WEIGHT: 192.8 LBS | HEART RATE: 77 BPM | HEIGHT: 69 IN | BODY MASS INDEX: 28.56 KG/M2 | RESPIRATION RATE: 16 BRPM | SYSTOLIC BLOOD PRESSURE: 123 MMHG | DIASTOLIC BLOOD PRESSURE: 78 MMHG | TEMPERATURE: 98.6 F | OXYGEN SATURATION: 100 %

## 2024-12-24 DIAGNOSIS — R19.7 DIARRHEA, UNSPECIFIED TYPE: ICD-10-CM

## 2024-12-24 DIAGNOSIS — Z01.818 PREOP GENERAL PHYSICAL EXAM: Primary | ICD-10-CM

## 2024-12-24 DIAGNOSIS — R93.89 ABNORMAL MRI: ICD-10-CM

## 2024-12-24 DIAGNOSIS — I10 HYPERTENSION GOAL BP (BLOOD PRESSURE) < 140/90: ICD-10-CM

## 2024-12-24 DIAGNOSIS — R42 DIZZINESS: ICD-10-CM

## 2024-12-24 LAB — HGB BLD-MCNC: 13.7 G/DL (ref 11.7–15.7)

## 2024-12-24 PROCEDURE — 85018 HEMOGLOBIN: CPT | Performed by: FAMILY MEDICINE

## 2024-12-24 PROCEDURE — 36415 COLL VENOUS BLD VENIPUNCTURE: CPT | Performed by: FAMILY MEDICINE

## 2024-12-24 PROCEDURE — 99214 OFFICE O/P EST MOD 30 MIN: CPT | Performed by: FAMILY MEDICINE

## 2024-12-24 NOTE — PATIENT INSTRUCTIONS
How to Take Your Medication Before Surgery  Preoperative Medication Instructions   Antiplatelet or Anticoagulation Medication Instructions   - Patient is on no antiplatelet or anticoagulation medications.    Additional Medication Instructions  Take all scheduled medications on the day of surgery       Patient Education   Preparing for Your Surgery  For Adults  Getting started  In most cases, a nurse will call to review your health history and instructions. They will give you an arrival time based on your scheduled surgery time. Please be ready to share:  Your doctor's clinic name and phone number  Your medical, surgical, and anesthesia history  A list of allergies and sensitivities  A list of medicines, including herbal treatments and over-the-counter drugs  Whether the patient has a legal guardian (ask how to send us the papers in advance)  Note: You may not receive a call if you were seen at our PAC (Preoperative Assessment Center).  Please tell us if you're pregnant--or if there's any chance you might be pregnant. Some surgeries may injure a fetus (unborn baby), so they require a pregnancy test. Surgeries that are safe for a fetus don't always need a test, and you can choose whether to have one.   Preparing for surgery  Within 10 to 30 days of surgery: Have a pre-op exam (sometimes called an H&P, or History and Physical). This can be done at a clinic or pre-operative center.  If you're having a , you may not need this exam. Talk to your care team.  At your pre-op exam, talk to your care team about all medicines you take. (This includes CBD oil and any drugs, such as THC, marijuana, and other forms of cannabis.) If you need to stop any medicine before surgery, ask when to start taking it again.  This is for your safety. Many medicines and drugs can make you bleed too much during surgery. Some change how well surgery (anesthesia) drugs work.  Call your insurance company to let them know you're having  surgery. (If you don't have insurance, call [phone].)  Call your clinic if there's any change in your health. This includes a scrape or scratch near the surgery site, or any signs of a cold (sore throat, runny nose, cough, rash, fever).  Eating and drinking guidelines  For your safety: Unless your surgeon tells you otherwise, follow the guidelines below.  Eat and drink as normal until 8 hours before you arrive for surgery. After that, no food or milk. You can spit out gum when you arrive.  Drink clear liquids until 2 hours before you arrive. These are liquids you can see through, like water, Gatorade, and Propel Water. They also include plain black coffee and tea (no cream or milk).  No alcohol for 24 hours before you arrive. The night before surgery, stop any drinks that contain THC.  If your care team tells you to take medicine on the morning of surgery, it's okay to take it with a sip of water. No other medicines or drugs are allowed (including CBD oil)--follow your care team's instructions.  If you have questions the day of surgery, call your hospital or surgery center.   Preventing infection  Shower or bathe the night before and the morning of surgery. Follow the instructions your clinic gave you. (If no instructions, use regular soap.)  Don't shave or clip hair near your surgery site. We'll remove the hair if needed.  Don't smoke or vape the morning of surgery. No chewing tobacco for 6 hours before you arrive. A nicotine patch is okay. You may spit out nicotine gum when you arrive.  For some surgeries, the surgeon will tell you to fully quit smoking and nicotine.  We will make every effort to keep you safe from infection. We will:  Clean our hands often with soap and water (or an alcohol-based hand rub).  Clean the skin at your surgery site with a special soap that kills germs.  Give you a special gown to keep you warm. (Cold raises the risk of infection.)  Wear hair covers, masks, gowns, and gloves  during surgery.  Give antibiotic medicine, if prescribed. Not all surgeries need this medicine.  What to bring on the day of surgery  Photo ID and insurance card  Copy of your health care directive, if you have one  Glasses and hearing aids (bring cases)  You can't wear contacts during surgery  Inhaler and eye drops, if you use them (tell us about these when you arrive)  CPAP machine or breathing device, if you use them  A few personal items, if spending the night  If you have . . .  A pacemaker, ICD (cardiac defibrillator), or other implant: Bring the ID card.  An implanted stimulator: Bring the remote control.  A legal guardian: Bring a copy of the certified (court-stamped) guardianship papers.  Please remove any jewelry, including body piercings. Leave jewelry and other valuables at home.  If you're going home the day of surgery  You must have a responsible adult drive you home. They should stay with you overnight as well.  If you don't have someone to stay with you, and you aren't safe to go home alone, we may keep you overnight. Insurance often won't pay for this.  After surgery  If it's hard to control your pain or you need more pain medicine, please call your surgeon's office.  Questions?   If you have any questions for your care team, list them here:   ____________________________________________________________________________________________________________________________________________________________________________________________________________________________________________________________  For informational purposes only. Not to replace the advice of your health care provider. Copyright   2003, 2019 [facility]. All rights reserved. Clinically reviewed by Jasper Mariscal MD. [employer] 149558 - REV 08/24.    EXAM: MR LIVER W/O and W CONTRAST  LOCATION: [facility]  DATE: 12/22/2024     INDICATION: abn abdominal CT with regard to liver  COMPARISON: Noncontrast  CT abdomen and pelvis from 12/03/2024.  TECHNIQUE: Routine MRI liver protocol including T1 in/out phase, diffusion, multiplane T2, and dynamic T1 with IV contrast.    CONTRAST: 9mL Gadavist     FINDINGS:      LIVER: Roughly 6 x 5 x 4 cm juxta capsular liver mass in the right hepatic dome segment 7 demonstrates T1 isointense and T2 slightly hyperintense signal intensity with internal foci of T1 hypointense and T2 hyperintensity. This mass demonstrates early   intense arterial phase enhancement (series 11, image 30) and minimal residual enhancement on venous phase, including a central serpiginous hyperintense focus which could represent a central scar (series 12, image 32). No additional liver lesion. No   hepatic steatosis or hepatomegaly.      ADDITIONAL FINDINGS: Duplicated left renal collecting system with atrophy of the left upper pole moiety. Unremarkable right kidney. Unremarkable adrenal glands, pancreas, spleen, stomach, and bowel.                                                                      IMPRESSION:  1.  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 Recommend liver MRI in 3-6 months, this time with hepatobiliary contrast agent (such as Eovist). This will allow a more definitive   evaluation and assess for any interval size changes.

## 2024-12-24 NOTE — PROGRESS NOTES
Preoperative Evaluation  [facility]  [id number] [place] [id number]  Phone: [phone]  Primary Provider: Claudia Erickson MD  Pre-op Performing Provider: Claudia Erickson MD  Dec 24, 2024             12/19/2024   Surgical Information   What procedure is being done? Colonoscopy and endoscopy   Facility or Hospital where procedure/surgery will be performed: [facility]   Who is doing the procedure / surgery? Maria Fernanda   Date of surgery / procedure: 12/30/24   Time of surgery / procedure: 2:30pm   Where do you plan to recover after surgery? at home with family     Fax number for surgical facility: [phone]    Assessment & Plan     The proposed surgical procedure is considered LOW risk.    Preop general physical exam  Fit for procedures   - Hemoglobin    Diarrhea, unspecified type  Reason for procedures     Hypertension goal BP (blood pressure) < 140/90  Under control   Hx of SVT found on zio patch - longest run 24 seconds  Gave handout about this               - No identified additional risk factors other than previously addressed    Preoperative Medication Instructions  Antiplatelet or Anticoagulation Medication Instructions   - Patient is on no antiplatelet or anticoagulation medications.    Additional Medication Instructions  Take all scheduled medications on the day of surgery    Recommendation  Approval given to proceed with proposed procedure, without further diagnostic evaluation.    Devon Fitzpatrick is a 54 year old, presenting for the following:  Pre-Op Exam (DOS 12/30/24 -Colonoscopy)          12/24/2024     8:54 AM   Additional Questions   Roomed by Grecia   Accompanied by Self         12/24/2024     8:54 AM   Patient Reported Additional Medications   Patient reports taking the following new medications amlodipine     HPI related to upcoming procedure: Nanette Burnett is here for preop for colonoscopy and EGD.   She has had powerful episodes of diarrhea.   Since  I last saw her 1.5 months ago she has not had another episode.   Labs done were normal but MRI of liver showed ? 6 cm spot which could be FNH and repeat MRI was recommended         12/19/2024   Pre-Op Questionnaire   Have you ever had a heart attack or stroke? No   Have you ever had surgery on your heart or blood vessels, such as a stent placement, a coronary artery bypass, or surgery on an artery in your head, neck, heart, or legs? No   Do you have chest pain with activity? No   Do you have a history of heart failure? No   Do you currently have a cold, bronchitis or symptoms of other infection? No   Do you have a cough, shortness of breath, or wheezing? No   Do you or anyone in your family have previous history of blood clots? No   Do you or does anyone in your family have a serious bleeding problem such as prolonged bleeding following surgeries or cuts? No   Have you ever had problems with anemia or been told to take iron pills? No   Have you had any abnormal blood loss such as black, tarry or bloody stools, or abnormal vaginal bleeding? No   Have you ever had a blood transfusion? No   Are you willing to have a blood transfusion if it is medically needed before, during, or after your surgery? Yes   Have you or any of your relatives ever had problems with anesthesia? No   Do you have sleep apnea, excessive snoring or daytime drowsiness? No   Do you have any artifical heart valves or other implanted medical devices like a pacemaker, defibrillator, or continuous glucose monitor? No   Do you have artificial joints? No   Are you allergic to latex? No     Health Care Directive  Patient does not have a Health Care Directive: Discussed advance care planning with patient; information given to patient to review.    Preoperative Review of    reviewed - no record of controlled substances prescribed.          Patient Active Problem List    Diagnosis Date Noted    Impingement syndrome of right shoulder 02/20/2023      Priority: Medium    Rosenthal's neuroma of left foot 05/12/2022     Priority: Medium    Hypertension goal BP (blood pressure) < 140/90 05/22/2017     Priority: Medium    Anemia, unspecified type 06/01/2016     Priority: Medium    Migraine      Priority: Medium     <2-3 times per year; lasting 45 min -1 hour.    Review dx with her in the future; can remove if not felt to be true migraine. See phone message 3/11/11  Problem list name updated by automated process. Provider to review      Dysplasia of cervix 10/19/2010     Priority: Medium     TCA '98  Yearly paps through 2018 11/19/13 NIL, Endometrial cells, irregular menses, referred to Ob~Gyn, normal ultrasound  10/21/14 NIL pap  09/10/15 NIL pap  10/07/16 NIL, Neg HPV  8/2/18 NIL, Neg HPV. Plan 3 yr co-test  12/8/21 NIL pap, Neg HPV  4/3/24 NIL Pap, Neg HPV.          CARDIOVASCULAR SCREENING; LDL GOAL LESS THAN 160 02/10/2010     Priority: Medium    External hemorrhoids 06/16/2007     Priority: Medium     Problem list name updated by automated process. Provider to review      Allergic state 08/21/2006     Priority: Medium     Problem list name updated by automated process. Provider to review      Anxiety state      Priority: Medium     Sees a Psychiatrist.  Problem list name updated by automated process. Provider to review      Herpes simplex virus (HSV) infection 01/31/2005     Priority: Medium     Problem list name updated by automated process. Provider to review        Past Medical History:   Diagnosis Date    Allergy, unspecified not elsewhere classified     Hayfever, better in the early 2000's    Anxiety state, unspecified     Dysplastic nevus     near toe. another suspicious mole right thigh. sees Derm - both removed    Genital herpes, unspecified     about 2 - 4 flares per year    Hypertension     Impingement syndrome of right shoulder 2/20/2023    Infection due to 2019 novel coronavirus 11/2021    Insomnia, unspecified     Migraine, unspecified, without  mention of intractable migraine without mention of status migrainosus     will have some with aura; has others     Past Surgical History:   Procedure Laterality Date    ABDOMEN SURGERY      ARTHROSCOPY KNEE Right 2016    lateral and medial meniscal tear    C/SECTION, LOW TRANSVERSE  2005    , Low Transverse    C/SECTION, LOW TRANSVERSE  2008    COLONOSCOPY      repeat in 10 years    HERNIORRHAPHY INGUINAL  2012    Procedure: HERNIORRHAPHY INGUINAL;;  Surgeon: Tray Valenzuela MD;  Location: [facility] OR    LAPAROSCOPIC TUBAL LIGATION  2012    Procedure: LAPAROSCOPIC TUBAL LIGATION;  LAPAROSCOPIC TUBAL LIGATION, Right Inguinal Hernia Repair with Mesh ;  Surgeon: Gerson Burrell MD;  Location: [facility] OR    [facility] NONSPECIFIC PROCEDURE  age 6    s/p tonsillectomy & adenoidectomy    [facility] NONSPECIFIC PROCEDURE  1994    L knee surgery ACL-arthroscopic/open    [facility] NONSPECIFIC PROCEDURE  1995    vein stripping x 2    [facility] NONSPECIFIC PROCEDURE  1997    R ACL surgery    [facility] NONSPECIFIC PROCEDURE  1995    s/p L inguinal hernia repair    [facility] NONSPECIFIC PROCEDURE  1998    abnormal pap-TCA    [facility] NONSPECIFIC PROCEDURE  2002    bunion surgery L    [facility] NONSPECIFIC PROCEDURE  2001    Bilateral saphenous vein     Current Outpatient Medications   Medication Sig Dispense Refill    amLODIPine (NORVASC) 5 MG tablet Take 1 tablet (5 mg) by mouth daily. 30 tablet 2    buPROPion (WELLBUTRIN XL) 150 MG 24 hr tablet Take 1 tablet (150 mg) by mouth every morning. 90 tablet 1    escitalopram (LEXAPRO) 10 MG tablet Take 1 tablet (10 mg) by mouth daily.      multivitamin, therapeutic (THERA-VIT) TABS tablet Take 1 tablet by mouth daily.      rizatriptan (MAXALT-MLT) 10 MG ODT Take 1 tablet (10 mg) by mouth at onset of headache for migraine May repeat in 2 hours. Max 3 tablets/24 hours. 12 tablet 3    hydrOXYzine HCl (ATARAX) 25 MG tablet TAKE 1 TABLET(25 MG) BY MOUTH  "EVERY NIGHT AS NEEDED FOR ANXIETY OR SLEEP 30 tablet 11    valACYclovir (VALTREX) 500 MG tablet Take 1 tablet (500 mg) by mouth 2 times daily. For 3 days with flare 12 tablet 3       Allergies   Allergen Reactions    Ace Inhibitors Angioedema     11/5/24 Dr. Sacha Berrios: Detailed discussion during allergy consult. Lisinopril likely culprit of lip angioedema. Would recommend all ACE inhibitors be avoided, and instead patient be started on calcium antagonist, ARB, diuretic, or as a last resort, ARB.        Social History     Tobacco Use    Smoking status: Never    Smokeless tobacco: Never    Tobacco comments:     occasionally in the past - social   Substance Use Topics    Alcohol use: Yes     Alcohol/week: 0.0 - 1.0 standard drinks of alcohol     Family History   Problem Relation Age of Onset    Cancer Other         Prostate CA    Hypertension Father     Neurologic Disorder Father         ALS    Thyroid Disease Mother         actually hypoglycemia; not thyroid.    Hypertension Mother     Breast Cancer Mother         Was taken care of with radiation    Hypertension Maternal Grandmother     Cerebrovascular Disease Maternal Grandmother     Cancer Maternal Grandmother         ovarian/uterine dx at age 50's    Breast Cancer Paternal Grandmother         dx at age 60's     History   Drug Use No             Review of Systems  Constitutional, HEENT, cardiovascular, pulmonary, gi and gu systems are negative, except as otherwise noted.    Objective    /78 (BP Location: Left arm, Patient Position: Sitting, Cuff Size: Adult Large)   Pulse 77   Temp 98.6  F (37  C) (Oral)   Resp 16   Ht 1.753 m (5' 9\")   Wt 87.5 kg (192 lb 12.8 oz)   SpO2 100%   BMI 28.47 kg/m     Estimated body mass index is 28.47 kg/m  as calculated from the following:    Height as of this encounter: 1.753 m (5' 9\").    Weight as of this encounter: 87.5 kg (192 lb 12.8 oz).  Physical Exam  GENERAL: alert and no distress  EYES: Eyes grossly normal " to inspection, PERRL and conjunctivae and sclerae normal  NECK: no adenopathy, no asymmetry, masses, or scars  RESP: lungs clear to auscultation - no rales, rhonchi or wheezes  CV: regular rate and rhythm, normal S1 S2, no S3 or S4, no murmur, click or rub, no peripheral edema  ABDOMEN: soft, nontender, no hepatosplenomegaly, no masses and bowel sounds normal  MS: no gross musculoskeletal defects noted, no edema  SKIN: no suspicious lesions or rashes  NEURO: Normal strength and tone, mentation intact and speech normal  PSYCH: mentation appears normal, affect normal/bright  LYMPH: no cervical, supraclavicular, axillary, or inguinal adenopathy    Recent Labs   Lab Test 11/01/24  1504 04/03/24  1035   HGB 13.4  --      --     141   POTASSIUM 4.6 4.0   CR 0.72 0.75        Diagnostics  Labs pending at this time.  Results will be reviewed when available.   No EKG required for low risk surgery (cataract, skin procedure, breast biopsy, etc).    Revised Cardiac Risk Index (RCRI)  The patient has the following serious cardiovascular risks for perioperative complications:   - No serious cardiac risks = 0 points     RCRI Interpretation: 0 points: Class I (very low risk - 0.4% complication rate)         Signed Electronically by: Claudia Erickson MD  A copy of this evaluation report is provided to the requesting physician.

## 2024-12-26 ENCOUNTER — TELEPHONE (OUTPATIENT)
Dept: FAMILY MEDICINE | Facility: CLINIC | Age: 54
End: 2024-12-26
Payer: COMMERCIAL

## 2024-12-26 NOTE — TELEPHONE ENCOUNTER
RN received call from patient. Patient had pre-op appointment with Dr. Erickson 12/24/24. Patient notes location and date for her procedure has changed. Patient needs pre-op faxed to [facility] at # [phone]. Patient's procedure is now on 12/31/24.     OFFICE VISIT notes faxed.     Celso JIMENEZ RN 12/26/2024 at 12:50 PM

## 2024-12-29 ENCOUNTER — MYC REFILL (OUTPATIENT)
Dept: FAMILY MEDICINE | Facility: CLINIC | Age: 54
End: 2024-12-29
Payer: COMMERCIAL

## 2024-12-29 ENCOUNTER — MYC MEDICAL ADVICE (OUTPATIENT)
Dept: FAMILY MEDICINE | Facility: CLINIC | Age: 54
End: 2024-12-29
Payer: COMMERCIAL

## 2024-12-29 DIAGNOSIS — B00.9 HERPES SIMPLEX VIRUS (HSV) INFECTION: ICD-10-CM

## 2024-12-29 DIAGNOSIS — I10 HYPERTENSION GOAL BP (BLOOD PRESSURE) < 140/90: ICD-10-CM

## 2024-12-29 DIAGNOSIS — G43.109 MIGRAINE WITH AURA AND WITHOUT STATUS MIGRAINOSUS, NOT INTRACTABLE: ICD-10-CM

## 2024-12-29 DIAGNOSIS — F41.1 ANXIETY STATE: ICD-10-CM

## 2024-12-30 RX ORDER — BUPROPION HYDROCHLORIDE 150 MG/1
150 TABLET ORAL EVERY MORNING
Qty: 90 TABLET | Refills: 1 | OUTPATIENT
Start: 2024-12-30

## 2024-12-30 RX ORDER — ESCITALOPRAM OXALATE 10 MG/1
10 TABLET ORAL DAILY
Qty: 90 TABLET | Refills: 1 | Status: SHIPPED | OUTPATIENT
Start: 2024-12-30

## 2024-12-30 RX ORDER — AMLODIPINE BESYLATE 5 MG/1
5 TABLET ORAL DAILY
Qty: 30 TABLET | Refills: 2 | OUTPATIENT
Start: 2024-12-30

## 2024-12-30 RX ORDER — VALACYCLOVIR HYDROCHLORIDE 500 MG/1
500 TABLET, FILM COATED ORAL 2 TIMES DAILY
Qty: 12 TABLET | Refills: 3 | OUTPATIENT
Start: 2024-12-30

## 2024-12-30 RX ORDER — HYDROXYZINE HYDROCHLORIDE 25 MG/1
TABLET, FILM COATED ORAL
Qty: 30 TABLET | Refills: 11 | OUTPATIENT
Start: 2024-12-30

## 2024-12-30 RX ORDER — RIZATRIPTAN BENZOATE 10 MG/1
10 TABLET, ORALLY DISINTEGRATING ORAL
Qty: 12 TABLET | Refills: 3 | OUTPATIENT
Start: 2024-12-30

## 2024-12-31 ENCOUNTER — ANESTHESIA (OUTPATIENT)
Dept: GASTROENTEROLOGY | Facility: CLINIC | Age: 54
End: 2024-12-31
Payer: COMMERCIAL

## 2024-12-31 ENCOUNTER — ANESTHESIA EVENT (OUTPATIENT)
Dept: GASTROENTEROLOGY | Facility: CLINIC | Age: 54
End: 2024-12-31
Payer: COMMERCIAL

## 2024-12-31 ENCOUNTER — HOSPITAL ENCOUNTER (OUTPATIENT)
Facility: CLINIC | Age: 54
Discharge: HOME | End: 2024-12-31
Attending: INTERNAL MEDICINE | Admitting: INTERNAL MEDICINE
Payer: COMMERCIAL

## 2024-12-31 VITALS
OXYGEN SATURATION: 100 % | DIASTOLIC BLOOD PRESSURE: 80 MMHG | HEART RATE: 58 BPM | WEIGHT: 192 LBS | HEIGHT: 69 IN | BODY MASS INDEX: 28.44 KG/M2 | SYSTOLIC BLOOD PRESSURE: 117 MMHG | RESPIRATION RATE: 29 BRPM

## 2024-12-31 LAB
COLONOSCOPY: NORMAL
UPPER GI ENDOSCOPY: NORMAL

## 2024-12-31 PROCEDURE — 45380 COLONOSCOPY AND BIOPSY: CPT | Performed by: INTERNAL MEDICINE

## 2024-12-31 PROCEDURE — 250N000011 HC RX IP 250 OP 636: Performed by: NURSE ANESTHETIST, CERTIFIED REGISTERED

## 2024-12-31 PROCEDURE — 250N000009 HC RX 250: Performed by: NURSE ANESTHETIST, CERTIFIED REGISTERED

## 2024-12-31 PROCEDURE — 999N000010 HC STATISTIC ANES STAT CODE-CRNA PER MINUTE: Performed by: INTERNAL MEDICINE

## 2024-12-31 PROCEDURE — 43239 EGD BIOPSY SINGLE/MULTIPLE: CPT | Performed by: INTERNAL MEDICINE

## 2024-12-31 PROCEDURE — 88305 TISSUE EXAM BY PATHOLOGIST: CPT | Mod: TC | Performed by: INTERNAL MEDICINE

## 2024-12-31 PROCEDURE — 370N000017 HC ANESTHESIA TECHNICAL FEE, PER MIN: Performed by: INTERNAL MEDICINE

## 2024-12-31 PROCEDURE — 258N000003 HC RX IP 258 OP 636: Performed by: NURSE ANESTHETIST, CERTIFIED REGISTERED

## 2024-12-31 PROCEDURE — 88305 TISSUE EXAM BY PATHOLOGIST: CPT | Mod: 26 | Performed by: PATHOLOGY

## 2024-12-31 RX ORDER — ONDANSETRON 2 MG/ML
4 INJECTION INTRAMUSCULAR; INTRAVENOUS
Status: CANCELLED | OUTPATIENT
Start: 2024-12-31

## 2024-12-31 RX ORDER — SODIUM CHLORIDE, SODIUM LACTATE, POTASSIUM CHLORIDE, CALCIUM CHLORIDE 600; 310; 30; 20 MG/100ML; MG/100ML; MG/100ML; MG/100ML
INJECTION, SOLUTION INTRAVENOUS CONTINUOUS PRN
Status: DISCONTINUED | OUTPATIENT
Start: 2024-12-31 | End: 2024-12-31

## 2024-12-31 RX ORDER — ONDANSETRON 4 MG/1
4 TABLET, ORALLY DISINTEGRATING ORAL EVERY 6 HOURS PRN
Status: CANCELLED | OUTPATIENT
Start: 2024-12-31

## 2024-12-31 RX ORDER — LIDOCAINE 40 MG/G
CREAM TOPICAL
Status: CANCELLED | OUTPATIENT
Start: 2024-12-31

## 2024-12-31 RX ORDER — NALOXONE HYDROCHLORIDE 0.4 MG/ML
0.4 INJECTION, SOLUTION INTRAMUSCULAR; INTRAVENOUS; SUBCUTANEOUS
Status: CANCELLED | OUTPATIENT
Start: 2024-12-31

## 2024-12-31 RX ORDER — LIDOCAINE HYDROCHLORIDE 20 MG/ML
INJECTION, SOLUTION INFILTRATION; PERINEURAL PRN
Status: DISCONTINUED | OUTPATIENT
Start: 2024-12-31 | End: 2024-12-31

## 2024-12-31 RX ORDER — PROCHLORPERAZINE MALEATE 10 MG
10 TABLET ORAL EVERY 6 HOURS PRN
Status: CANCELLED | OUTPATIENT
Start: 2024-12-31

## 2024-12-31 RX ORDER — NALOXONE HYDROCHLORIDE 0.4 MG/ML
0.2 INJECTION, SOLUTION INTRAMUSCULAR; INTRAVENOUS; SUBCUTANEOUS
Status: CANCELLED | OUTPATIENT
Start: 2024-12-31

## 2024-12-31 RX ORDER — ONDANSETRON 2 MG/ML
4 INJECTION INTRAMUSCULAR; INTRAVENOUS EVERY 6 HOURS PRN
Status: CANCELLED | OUTPATIENT
Start: 2024-12-31

## 2024-12-31 RX ORDER — FLUMAZENIL 0.1 MG/ML
0.2 INJECTION, SOLUTION INTRAVENOUS
Status: CANCELLED | OUTPATIENT
Start: 2024-12-31 | End: 2024-12-31

## 2024-12-31 RX ORDER — PROPOFOL 10 MG/ML
INJECTION, EMULSION INTRAVENOUS PRN
Status: DISCONTINUED | OUTPATIENT
Start: 2024-12-31 | End: 2024-12-31

## 2024-12-31 RX ORDER — ONDANSETRON 2 MG/ML
INJECTION INTRAMUSCULAR; INTRAVENOUS PRN
Status: DISCONTINUED | OUTPATIENT
Start: 2024-12-31 | End: 2024-12-31

## 2024-12-31 RX ORDER — PROPOFOL 10 MG/ML
INJECTION, EMULSION INTRAVENOUS CONTINUOUS PRN
Status: DISCONTINUED | OUTPATIENT
Start: 2024-12-31 | End: 2024-12-31

## 2024-12-31 RX ADMIN — LIDOCAINE HYDROCHLORIDE 50 MG: 20 INJECTION, SOLUTION INFILTRATION; PERINEURAL at 10:31

## 2024-12-31 RX ADMIN — PROPOFOL 10 MG: 10 INJECTION, EMULSION INTRAVENOUS at 10:43

## 2024-12-31 RX ADMIN — PROPOFOL 150 MCG/KG/MIN: 10 INJECTION, EMULSION INTRAVENOUS at 10:31

## 2024-12-31 RX ADMIN — PROPOFOL 30 MG: 10 INJECTION, EMULSION INTRAVENOUS at 10:32

## 2024-12-31 RX ADMIN — PROPOFOL 10 MG: 10 INJECTION, EMULSION INTRAVENOUS at 10:55

## 2024-12-31 RX ADMIN — PROPOFOL 10 MG: 10 INJECTION, EMULSION INTRAVENOUS at 10:45

## 2024-12-31 RX ADMIN — ONDANSETRON 4 MG: 2 INJECTION INTRAMUSCULAR; INTRAVENOUS at 10:35

## 2024-12-31 RX ADMIN — SODIUM CHLORIDE, POTASSIUM CHLORIDE, SODIUM LACTATE AND CALCIUM CHLORIDE: 600; 310; 30; 20 INJECTION, SOLUTION INTRAVENOUS at 10:29

## 2024-12-31 RX ADMIN — PROPOFOL 20 MG: 10 INJECTION, EMULSION INTRAVENOUS at 10:34

## 2024-12-31 ASSESSMENT — ACTIVITIES OF DAILY LIVING (ADL)
ADLS_ACUITY_SCORE: 41

## 2024-12-31 ASSESSMENT — LIFESTYLE VARIABLES: TOBACCO_USE: 0

## 2024-12-31 ASSESSMENT — ENCOUNTER SYMPTOMS
DYSRHYTHMIAS: 1
SEIZURES: 0

## 2024-12-31 NOTE — ANESTHESIA POSTPROCEDURE EVALUATION
Patient: Nanette Burnett    Procedure: Procedure(s):  ESOPHAGOGASTRODUODENOSCOPY, WITH BIOPSY  COLONOSCOPY, WITH POLYPECTOMY AND BIOPSY       Anesthesia Type:  MAC    Note:  Disposition: Outpatient   Postop Pain Control: Uneventful            Sign Out: Well controlled pain   PONV: No   Neuro/Psych: Uneventful            Sign Out: Acceptable/Baseline neuro status   Airway/Respiratory: Uneventful            Sign Out: Acceptable/Baseline resp. status   CV/Hemodynamics: Uneventful            Sign Out: Acceptable CV status; No obvious hypovolemia; No obvious fluid overload   Other NRE: NONE   DID A NON-ROUTINE EVENT OCCUR? No           Last vitals:  Vitals Value Taken Time   /80 12/31/24 1120   Temp     Pulse 65 12/31/24 1127   Resp 42 12/31/24 1127   SpO2 100 % 12/31/24 1125   Vitals shown include unfiled device data.    Electronically Signed By: Shirley Dan MD  December 31, 2024  11:46 AM

## 2024-12-31 NOTE — ANESTHESIA PREPROCEDURE EVALUATION
Anesthesia Pre-Procedure Evaluation    Patient: Nanette Burnett   MRN: [id number] : 1970        Procedure : Procedure(s):  ESOPHAGOGASTRODUODENOSCOPY with  Colonoscopy          Past Medical History:   Diagnosis Date    Allergy, unspecified not elsewhere classified     Hayfever, better in the early     Anxiety state, unspecified     Dysplastic nevus     near toe. another suspicious mole right thigh. sees Derm - both removed    Genital herpes, unspecified     about 2 - 4 flares per year    Hypertension     Impingement syndrome of right shoulder 2023    Infection due to 2019 novel coronavirus 2021    Insomnia, unspecified     Migraine, unspecified, without mention of intractable migraine without mention of status migrainosus     will have some with aura; has others      Past Surgical History:   Procedure Laterality Date    ABDOMEN SURGERY      ARTHROSCOPY KNEE Right 2016    lateral and medial meniscal tear    C/SECTION, LOW TRANSVERSE  2005    , Low Transverse    C/SECTION, LOW TRANSVERSE  2008    COLONOSCOPY      repeat in 10 years    HERNIORRHAPHY INGUINAL  2012    Procedure: HERNIORRHAPHY INGUINAL;;  Surgeon: Tray Valenzuela MD;  Location:  OR    LAPAROSCOPIC TUBAL LIGATION  2012    Procedure: LAPAROSCOPIC TUBAL LIGATION;  LAPAROSCOPIC TUBAL LIGATION, Right Inguinal Hernia Repair with Mesh ;  Surgeon: Gerson Burrell MD;  Location:  OR    [facility] NONSPECIFIC PROCEDURE  age 6    s/p tonsillectomy & adenoidectomy    [facility] NONSPECIFIC PROCEDURE  1994    L knee surgery ACL-arthroscopic/open    [facility] NONSPECIFIC PROCEDURE  1995    vein stripping x 2    [facility] NONSPECIFIC PROCEDURE  1997    R ACL surgery    [facility] NONSPECIFIC PROCEDURE  1995    s/p L inguinal hernia repair    [facility] NONSPECIFIC PROCEDURE  1998    abnormal pap-TCA    [facility] NONSPECIFIC PROCEDURE  2002    bunion surgery L    [facility] NONSPECIFIC PROCEDURE  2001     Bilateral saphenous vein      Allergies   Allergen Reactions    Ace Inhibitors Angioedema     11/5/24 Dr. Sacha Berrios: Detailed discussion during allergy consult. Lisinopril likely culprit of lip angioedema. Would recommend all ACE inhibitors be avoided, and instead patient be started on calcium antagonist, ARB, diuretic, or as a last resort, ARB.      Social History     Tobacco Use    Smoking status: Never    Smokeless tobacco: Never    Tobacco comments:     occasionally in the past - social   Substance Use Topics    Alcohol use: Yes     Alcohol/week: 0.0 - 1.0 standard drinks of alcohol      Wt Readings from Last 1 Encounters:   12/24/24 87.5 kg (192 lb 12.8 oz)        Anesthesia Evaluation    Type: General.        ROS/MED HX  ENT/Pulmonary:    (-) tobacco use, asthma and sleep apnea   Neurologic:     (+)      migraines,                       (-) no seizures and no CVA   Cardiovascular:     (+)  hypertension- -   -  - -                        dysrhythmias (hx SVT),           (-) CAD, MONZON, valvular problems/murmurs, dyslipidemia and murmur   METS/Exercise Tolerance:     Hematologic:    (-) history of blood clots and anemia   Musculoskeletal:    (-) arthritis   GI/Hepatic: Comment: Chronic diarrhea   (-) GERD and liver disease   Renal/Genitourinary:    (-) renal disease and nephrolithiasis   Endo:    (-) Type I DM, Type II DM, thyroid disease and obesity   Psychiatric/Substance Use:     (+) psychiatric history anxiety       Infectious Disease:    (-) Recent Fever   Malignancy:       Other:            Physical Exam    Airway        Mallampati: II   TM distance: > 3 FB   Neck ROM: full   Mouth opening: > 3 cm    Respiratory Devices and Support         Dental       (+) Minor Abnormalities - some fillings, tiny chips      Cardiovascular   cardiovascular exam normal       Rhythm and rate: regular and normal (-) no murmur    Pulmonary   pulmonary exam normal        breath sounds clear to auscultation           OUTSIDE  "LABS:  CBC:   Lab Results   Component Value Date    WBC 6.4 11/01/2024    WBC 5.1 01/25/2023    HGB 13.7 12/24/2024    HGB 13.4 11/01/2024    HCT 40.5 11/01/2024    HCT 41.8 01/25/2023     11/01/2024     01/25/2023     BMP:   Lab Results   Component Value Date     11/01/2024     04/03/2024    POTASSIUM 4.6 11/01/2024    POTASSIUM 4.0 04/03/2024    CHLORIDE 106 11/01/2024    CHLORIDE 104 04/03/2024    CO2 30 (H) 11/01/2024    CO2 28 04/03/2024    BUN 9.7 11/01/2024    BUN 11.1 04/03/2024    CR 0.72 11/01/2024    CR 0.75 04/03/2024    GLC 89 11/01/2024    GLC 83 04/03/2024     COAGS: No results found for: \"PTT\", \"INR\", \"FIBR\"  POC:   Lab Results   Component Value Date    HCG Negative 07/27/2012     HEPATIC:   Lab Results   Component Value Date    ALBUMIN 4.5 11/01/2024    PROTTOTAL 7.4 11/01/2024    ALT 25 11/01/2024    AST 29 11/01/2024    ALKPHOS 71 11/01/2024    BILITOTAL 0.3 11/01/2024     OTHER:   Lab Results   Component Value Date    LEDY 9.3 11/01/2024    PHOS 3.7 08/02/2018    TSH 1.77 04/03/2024       Anesthesia Plan    ASA Status:  2       Anesthesia Type: MAC.     - Reason for MAC: straight local not clinically adequate              Consents    Anesthesia Plan(s) and associated risks, benefits, and realistic alternatives discussed. Questions answered and patient/representative(s) expressed understanding.     - Discussed:     - Discussed with:  Patient            Postoperative Care    Pain management: Multi-modal analgesia.   PONV prophylaxis: Dexamethasone or Solumedrol, Ondansetron (or other 5HT-3)     Comments:               Shirley Dan MD    I have reviewed the pertinent notes and labs in the chart from the past 30 days and (re)examined the patient.  Any updates or changes from those notes are reflected in this note.               # Hypertension: Noted on problem list           # Overweight: Estimated body mass index is 28.47 kg/m  as calculated from the following:    " "Height as of 12/24/24: 1.753 m (5' 9\").    Weight as of 12/24/24: 87.5 kg (192 lb 12.8 oz).             "

## 2024-12-31 NOTE — ANESTHESIA CARE TRANSFER NOTE
Patient: Nanette Burnett    Procedure: Procedure(s):  ESOPHAGOGASTRODUODENOSCOPY, WITH BIOPSY  COLONOSCOPY, WITH POLYPECTOMY AND BIOPSY       Diagnosis: Blood in stool [K92.1]  Change in bowel habits [R19.4]  Gas bloat syndrome [K92.89]  Nausea & vomiting [R11.2]  Diagnosis Additional Information: No value filed.    Anesthesia Type:   MAC     Note:    Oropharynx: oropharynx clear of all foreign objects and spontaneously breathing  Level of Consciousness: awake  Oxygen Supplementation: room air    Independent Airway: airway patency satisfactory and stable  Dentition: dentition unchanged  Vital Signs Stable: post-procedure vital signs reviewed and stable  Report to RN Given: handoff report given  Patient transferred to: Phase II  Comments: Pt to ENDO Phase 2 on room air, airway patent, VSS. Report to RN.  Handoff Report: Identifed the Patient, Identified the Reponsible Provider, Reviewed the pertinent medical history, Discussed the surgical course, Reviewed Intra-OP anesthesia mangement and issues during anesthesia, Set expectations for post-procedure period and Allowed opportunity for questions and acknowledgement of understanding  Vitals:  Vitals Value Taken Time   BP     Temp     Pulse     Resp     SpO2         Electronically Signed By: DEONNA Elmore CRNA  December 31, 2024  11:07 AM

## 2024-12-31 NOTE — H&P
[facility] - Pre-Endoscopy History and Physical     Nanette Burnett MRN# [id number]   YOB: 1970 Age: 54 year old     Date of Procedure: 12/31/2024  Primary care provider: Claudia Erickson  Type of Endoscopy: EGD/colonoscopy  Reason for Procedure: Change in bowel habit, rectal bleeding  Type of Anesthesia Anticipated: MAC    HPI:    Nanette is a 54 year old female who will be undergoing the above procedure.      A history and physical has been performed, 12/19/24, reviewed.     The patient's medications and allergies have been reviewed. The risks and benefits of the procedure and the sedation options and risks were discussed with the patient.  All questions were answered and informed consent was obtained.      She denies a personal or family history of anesthesia complications or bleeding disorders.     Patient Active Problem List   Diagnosis    Herpes simplex virus (HSV) infection    Anxiety state    Allergic state    External hemorrhoids    CARDIOVASCULAR SCREENING; LDL GOAL LESS THAN 160    Dysplasia of cervix    Migraine    Anemia, unspecified type    Hypertension goal BP (blood pressure) < 140/90    Rosenthal's neuroma of left foot    Impingement syndrome of right shoulder        Past Medical History:   Diagnosis Date    Allergy, unspecified not elsewhere classified     Hayfever, better in the early 2000's    Anxiety state, unspecified     Dysplastic nevus     near toe. another suspicious mole right thigh. sees Derm - both removed    Genital herpes, unspecified     about 2 - 4 flares per year    Hypertension     Impingement syndrome of right shoulder 02/20/2023    Infection due to 2019 novel coronavirus 11/2021    Insomnia, unspecified     Migraine, unspecified, without mention of intractable migraine without mention of status migrainosus     will have some with aura; has others    SVT (supraventricular tachycardia) (H)         Past Surgical History:   Procedure Laterality Date    ABDOMEN SURGERY      ARTHROSCOPY  KNEE Right 2016    lateral and medial meniscal tear    C/SECTION, LOW TRANSVERSE  2005    , Low Transverse    C/SECTION, LOW TRANSVERSE  2008    COLONOSCOPY      repeat in 10 years    HERNIORRHAPHY INGUINAL  2012    Procedure: HERNIORRHAPHY INGUINAL;;  Surgeon: Tray Valenzuela MD;  Location: [facility] OR    LAPAROSCOPIC TUBAL LIGATION  2012    Procedure: LAPAROSCOPIC TUBAL LIGATION;  LAPAROSCOPIC TUBAL LIGATION, Right Inguinal Hernia Repair with Mesh ;  Surgeon: Gerson Burrell MD;  Location: [facility] OR    [facility] NONSPECIFIC PROCEDURE  age 6    s/p tonsillectomy & adenoidectomy    [facility] NONSPECIFIC PROCEDURE  1994    L knee surgery ACL-arthroscopic/open    [facility] NONSPECIFIC PROCEDURE  1995    vein stripping x 2    [facility] NONSPECIFIC PROCEDURE  1997    R ACL surgery    [facility] NONSPECIFIC PROCEDURE  1995    s/p L inguinal hernia repair    [facility] NONSPECIFIC PROCEDURE  1998    abnormal pap-TCA    [facility] NONSPECIFIC PROCEDURE  2002    bunion surgery L    [facility] NONSPECIFIC PROCEDURE  2001    Bilateral saphenous vein       Social History     Tobacco Use    Smoking status: Never    Smokeless tobacco: Never    Tobacco comments:     occasionally in the past - social   Substance Use Topics    Alcohol use: Yes     Alcohol/week: 0.0 - 1.0 standard drinks of alcohol     Comment: 1 drink monthly       Family History   Problem Relation Age of Onset    Cancer Other         Prostate CA    Hypertension Father     Neurologic Disorder Father         ALS    Thyroid Disease Mother         actually hypoglycemia; not thyroid.    Hypertension Mother     Breast Cancer Mother         Was taken care of with radiation    Hypertension Maternal Grandmother     Cerebrovascular Disease Maternal Grandmother     Cancer Maternal Grandmother         ovarian/uterine dx at age 50's    Breast Cancer Paternal Grandmother         dx at age 60's       Prior to Admission medications    Medication  "Sig Start Date End Date Taking? Authorizing Provider   amLODIPine (NORVASC) 5 MG tablet Take 1 tablet (5 mg) by mouth daily. 11/12/24  Yes Claudia Erickson MD   buPROPion (WELLBUTRIN XL) 150 MG 24 hr tablet Take 1 tablet (150 mg) by mouth every morning. 11/18/24  Yes Claudia Erickson MD   escitalopram (LEXAPRO) 10 MG tablet Take 1 tablet (10 mg) by mouth daily. 12/30/24  Yes Claudia Erickson MD   hydrOXYzine HCl (ATARAX) 25 MG tablet TAKE 1 TABLET(25 MG) BY MOUTH EVERY NIGHT AS NEEDED FOR ANXIETY OR SLEEP 4/3/24  Yes Claudia Erickson MD   multivitamin, therapeutic (THERA-VIT) TABS tablet Take 1 tablet by mouth daily.   Yes Reported, Patient   rizatriptan (MAXALT-MLT) 10 MG ODT Take 1 tablet (10 mg) by mouth at onset of headache for migraine May repeat in 2 hours. Max 3 tablets/24 hours. 4/3/24  Yes Claudia Erickson MD   valACYclovir (VALTREX) 500 MG tablet Take 1 tablet (500 mg) by mouth 2 times daily. For 3 days with flare 12/11/24  Yes Claudia Erickson MD       Allergies   Allergen Reactions    Ace Inhibitors Angioedema     11/5/24 Dr. Sacha Berrios: Detailed discussion during allergy consult. Lisinopril likely culprit of lip angioedema. Would recommend all ACE inhibitors be avoided, and instead patient be started on calcium antagonist, ARB, diuretic, or as a last resort, ARB.        REVIEW OF SYSTEMS:   A comprehensive ROS was negative    PHYSICAL EXAM:   BP (!) 138/98   Pulse 68   Resp 16   Ht 1.753 m (5' 9\")   Wt 87.1 kg (192 lb)   SpO2 100%   BMI 28.35 kg/m   Estimated body mass index is 28.35 kg/m  as calculated from the following:    Height as of this encounter: 1.753 m (5' 9\").    Weight as of this encounter: 87.1 kg (192 lb).   GENERAL APPEARANCE: alert, and oriented  MENTAL STATUS: alert        IMPRESSION   Change in bowel habit  Rectal bleeding    PLAN:   EGD/Colonoscopy    The above has been forwarded to the consulting provider.      Signed Electronically by: Jalil Springer, DO  December 31, " 2024

## 2025-01-14 ENCOUNTER — VIRTUAL VISIT (OUTPATIENT)
Dept: FAMILY MEDICINE | Facility: CLINIC | Age: 55
End: 2025-01-14
Payer: COMMERCIAL

## 2025-01-14 DIAGNOSIS — R42 LIGHTHEADEDNESS: Primary | ICD-10-CM

## 2025-01-14 DIAGNOSIS — I47.10 SVT (SUPRAVENTRICULAR TACHYCARDIA): ICD-10-CM

## 2025-01-14 DIAGNOSIS — I10 HYPERTENSION GOAL BP (BLOOD PRESSURE) < 140/90: ICD-10-CM

## 2025-01-14 PROCEDURE — 98013 SYNCH AUDIO-ONLY EST LOW 20: CPT | Performed by: FAMILY MEDICINE

## 2025-01-14 NOTE — PROGRESS NOTES
Nanette is a 54 year old who is being evaluated via a billable telephone visit.    What phone number would you like to be contacted at? [phone]  How would you like to obtain your AVS? [place]  Originating Location (pt. Location): Home    Distant Location (provider location):  On-site  Telephone visit completed due to the patient did not consent to a video visit.    Assessment & Plan     Lightheadedness  With further symptoms consider   - Adult Cardiology Eval  Referral    SVT (supraventricular tachycardia)  Handout on the above diagnosis was given to the patient and discussed    - Adult Cardiology Eval  Referral    Hypertension goal BP (blood pressure) < 140/90  Under control  Continue current measures   Will see in April for physical   - Home Blood Pressure Monitor Order for DME - ONLY FOR DME          22 minutes spent by me on the date of the encounter doing chart review, review of test results, patient visit, and documentation       FUTURE APPOINTMENTS:       - Follow-up visit in 4 months and will order cardiology referral   See Patient Instructions    Subjective   Nanette is a 54 year old, presenting for the following health issues:   she is her to recheck on her blood pressure and her SVT found on zio patch.   She has had 2 notable episodes in the last week.   2-3 days ago it happened where her heart was going fast and lasted about 15-30 seconds and then yesterday at the gym and she felt off or light headed at that time.   She squatted down and it passed in about 30 seconds.    Her blood pressure has been pretty good and no side effects from her new med.   She did have a slightly high reading diastolically at the dentist last week.   She does not have a home cuff.   Hypertension (Blood pressure medication - stable - no side effects - SVT - two episodes (more often now with the SVT))        1/14/2025     9:34 AM   Additional Questions   Roomed by gloria hurst     History of Present  Illness       Reason for visit:  Follow up from several recent appointments    She eats 0-1 servings of fruits and vegetables daily.She consumes 0 sweetened beverage(s) daily.She exercises with enough effort to increase her heart rate 20 to 29 minutes per day.  She exercises with enough effort to increase her heart rate 4 days per week.   She is taking medications regularly.     Past Medical History:   Diagnosis Date    Allergy, unspecified not elsewhere classified     Hayfever, better in the early     Anxiety state, unspecified     Dysplastic nevus     near toe. another suspicious mole right thigh. sees Derm - both removed    Genital herpes, unspecified     about 2 - 4 flares per year    Hypertension     Impingement syndrome of right shoulder 2023    Infection due to 2019 novel coronavirus 2021    Insomnia, unspecified     Migraine, unspecified, without mention of intractable migraine without mention of status migrainosus     will have some with aura; has others    SVT (supraventricular tachycardia)        Past Surgical History:   Procedure Laterality Date    ABDOMEN SURGERY      ARTHROSCOPY KNEE Right 2016    lateral and medial meniscal tear    C/SECTION, LOW TRANSVERSE  2005    , Low Transverse    C/SECTION, LOW TRANSVERSE  2008    COLONOSCOPY      repeat in 10 years    COLONOSCOPY N/A 2024    Procedure: COLONOSCOPY, WITH POLYPECTOMY AND BIOPSY;  Surgeon: Jalil Springer DO;  Location:  GI    ESOPHAGOSCOPY, GASTROSCOPY, DUODENOSCOPY (EGD), COMBINED N/A 2024    Procedure: ESOPHAGOGASTRODUODENOSCOPY, WITH BIOPSY;  Surgeon: Jalil Springer DO;  Location:  GI    HERNIORRHAPHY INGUINAL  2012    Procedure: HERNIORRHAPHY INGUINAL;;  Surgeon: Tray Valenzuela MD;  Location:  OR    LAPAROSCOPIC TUBAL LIGATION  2012    Procedure: LAPAROSCOPIC TUBAL LIGATION;  LAPAROSCOPIC TUBAL LIGATION, Right Inguinal Hernia Repair with Mesh ;   Surgeon: Gerson Burrell MD;  Location:  OR    [facility] NONSPECIFIC PROCEDURE  age 6    s/p tonsillectomy & adenoidectomy    [facility] NONSPECIFIC PROCEDURE  06/01/1994    L knee surgery ACL-arthroscopic/open    [facility] NONSPECIFIC PROCEDURE  08/01/1995    vein stripping x 2    [facility] NONSPECIFIC PROCEDURE  08/01/1997    R ACL surgery    [facility] NONSPECIFIC PROCEDURE  01/01/1995    s/p L inguinal hernia repair    [facility] NONSPECIFIC PROCEDURE  08/01/1998    abnormal pap-TCA    [facility] NONSPECIFIC PROCEDURE  07/01/2002    bunion surgery L    [facility] NONSPECIFIC PROCEDURE  07/01/2001    Bilateral saphenous vein       MEDICATIONS:  Current Outpatient Medications   Medication Sig Dispense Refill    amLODIPine (NORVASC) 5 MG tablet Take 1 tablet (5 mg) by mouth daily. 30 tablet 2    buPROPion (WELLBUTRIN XL) 150 MG 24 hr tablet Take 1 tablet (150 mg) by mouth every morning. 90 tablet 1    escitalopram (LEXAPRO) 10 MG tablet Take 1 tablet (10 mg) by mouth daily. 90 tablet 1    hydrOXYzine HCl (ATARAX) 25 MG tablet TAKE 1 TABLET(25 MG) BY MOUTH EVERY NIGHT AS NEEDED FOR ANXIETY OR SLEEP 30 tablet 11    multivitamin, therapeutic (THERA-VIT) TABS tablet Take 1 tablet by mouth daily.      rizatriptan (MAXALT-MLT) 10 MG ODT Take 1 tablet (10 mg) by mouth at onset of headache for migraine May repeat in 2 hours. Max 3 tablets/24 hours. 12 tablet 3    valACYclovir (VALTREX) 500 MG tablet Take 1 tablet (500 mg) by mouth 2 times daily. For 3 days with flare 12 tablet 3     No current facility-administered medications for this visit.       SOCIAL HISTORY:  Social History     Tobacco Use    Smoking status: Never    Smokeless tobacco: Never    Tobacco comments:     occasionally in the past - social   Substance Use Topics    Alcohol use: Yes     Alcohol/week: 0.0 - 1.0 standard drinks of alcohol     Comment: 1 drink monthly       Family History   Problem Relation Age of Onset    Cancer Other         Prostate CA    Hypertension Father     Neurologic Disorder Father  "        ALS    Thyroid Disease Mother         actually hypoglycemia; not thyroid.    Hypertension Mother     Breast Cancer Mother         Was taken care of with radiation    Hypertension Maternal Grandmother     Cerebrovascular Disease Maternal Grandmother     Cancer Maternal Grandmother         ovarian/uterine dx at age 50's    Breast Cancer Paternal Grandmother         dx at age 60's           Review of Systems  Constitutional, HEENT, cardiovascular, pulmonary, gi and gu systems are negative, except as otherwise noted.      Objective    Vitals - Patient Reported  Weight (Patient Reported): 87.1 kg (192 lb)  Height (Patient Reported): 175.3 cm (5' 9\")  BMI (Based on Pt Reported Ht/Wt): 28.35        Physical Exam   General: Alert and no distress //Respiratory: No audible wheeze, cough, or shortness of breath // Psychiatric:  Appropriate affect, tone, and pace of words      BP Readings from Last 3 Encounters:   12/31/24 117/80   12/24/24 123/78   12/13/24 138/88      ZIO PATCH Indication: Dizziness and palpitations  14 day Ziopatch monitor  Baseline sinus rhythm with heart rates ranging , average 75 bpm.  20 runs of SVT, longest lasting 24 seconds  Patient triggered 1 event which correlated with a run of SVT.    Admission on 12/31/2024, Discharged on 12/31/2024   Component Date Value Ref Range Status    Case Report 12/31/2024    Final                    Value:Surgical Pathology Report                         Case: [id number]                                  Authorizing Provider:  Jalil Springer DO     Collected:           12/31/2024 10:35 AM          Ordering Location:     [facility]          Received:            12/31/2024 12:09 PM                                 [facility]                                                          Pathologist:           Charles Alexander MD                                                            Specimens:   A) - Stomach, Body                                "                                                   B) - Small Intestine, Duodenum                                                                      C) - Large Intestine, Colon, Random                                                        Final Diagnosis 12/31/2024    Final                    Value:A. Stomach, biopsies:  --Gastric mucosa with no significant histopathologic change.  --Negative for Helicobacter pylori organisms or dysplasia.      B. Duodenum, biopsies:  --No significant histopathologic change.    C. Colon, random, biopsies:  --No significant histopathologic change.            Clinical Information 12/31/2024    Final                    Value:Procedure:  ESOPHAGOGASTRODUODENOSCOPY, WITH BIOPSY  COLONOSCOPY, WITH POLYPECTOMY AND BIOPSY  Pre-op Diagnosis: Blood in stool [K92.1]  Change in bowel habits [R19.4]  Gas bloat syndrome [K92.89]  Nausea & vomiting [R11.2]  Post-op Diagnosis: K92.1 - Blood in stool [ICD-10-CM]  R19.4 - Change in bowel habits [ICD-10-CM]  K92.89 - Gas bloat syndrome [ICD-10-CM]  R11.2 - Nausea & vomiting [ICD-10-CM]      Gross Description 12/31/2024    Final                    Value:A(1). Stomach, Body, :  The specimen is received in formalin, labeled with the patient's name, medical record number and other identifying information and designated  stomach, body . It consists of 5 tan soft tissue fragments ranging from 0.1-0.4 cm. Entirely submitted in one cassette.    B(2). Small Intestine, Duodenum, :  The specimen is received in formalin, labeled with the patient's name, medical record number and other identifying information and designated  duodenum . It consists of 4 tan soft tissue fragments ranging from 0.2-0.5 cm. Entirely submitted in one cassette.    C(3). Large Intestine, Colon, Random, :  The specimen is received in formalin, labeled with the patient's name, medical record number and other identifying information and designated  random colon . It consists of 7 tan soft  tissue fragments ranging from 0.2-0.4 cm. Entirely submitted in one cassette.   (LILLIE Medina (ASCP) 12/31/2024 12:28 PM       Microscopic Description 12/31/2024    Final                    Value:Microscopic examination was performed.        Performing Labs 12/31/2024    Final                    Value:The technical component of this testing was completed at [facility].    Stain controls for all stains resulted within this report have been reviewed and show appropriate reactivity.       Upper GI Endoscopy 12/31/2024    Final                    Value:[facility]  [address] [place]  [address]  _______________________________________________________________________________  Patient Name: Nanette Burnett             Procedure Date: 12/31/2024 10:25 AM  MRN: [id number]                       Account Number: [id number]  YOB: 1970               Admit Type: Outpatient  Age: 54                               Room: [place] [address]  Note Status: Finalized                Attending MD: ZACARIAS BARROS DO,   Instrument Name: 509 GIF  Gastroscope   _______________________________________________________________________________     Procedure:                Upper GI endoscopy  Indications:              Change in bowel habit, nausea, bloating  Providers:                ZACARIAS BARROS DO, Jana Razo RN  Referring MD:             KODI BOLTON MD  Medicines:                Monitored Anesthesia Care  Complications:            No immediate complications.  ______________________________________________________                          _________________________  Procedure:                Pre-Anesthesia Assessment:                            - The risks and benefits of the procedure and the                             sedation options and risks were discussed with the                             patient. All questions were answered  and informed                             consent was obtained.                            - Patient identification and proposed procedure                             were verified prior to the procedure by the                             physician, the nurse and the anesthetist. The                             procedure was verified in the procedure room.                            After obtaining informed consent, the endoscope was                             passed under direct vision. Throughout the                             procedure, the patient's blood pressure, pulse, and                             oxygen saturations were monitored continuously. The                             end                          oscope 509 was introduced through the mouth, and                             advanced to the second part of duodenum. The upper                             GI endoscopy was accomplished without difficulty.                             The patient tolerated the procedure well.                                                                                   Findings:       The esophagus was normal.       A few localized erosions with no bleeding and no stigmata of recent        bleeding was found in the gastric body. Biopsies were taken with a cold        forceps for Helicobacter pylori testing.       The examined duodenum was normal. Biopsies were taken with a cold        forceps for histology.                                                                                   Impression:               - Normal esophagus.                            - Gastric erosions with no bleeding. Gastric                             biopsies obtained.                            - Normal examined duode                          num. Biopsied.  Recommendation:           - Await biopsy results.                            - Colonoscopy as scheduled.                                                                                      Jalil Springer DO  _____________________  JALIL SPRINGER DO  12/31/2024 11:01:45 AM  I was physically present for the entire viewing portion of the exam.  JALIL SPRINGER DO  Number of Addenda: 0    Note Initiated On: 12/31/2024 10:25 AM  Total Procedure Duration: 0 hours 3 minutes 41 seconds   Estimated Blood Loss:       None  Scope In: 10:36:07 AM  Scope Out: 10:39:48 AM      COLONOSCOPY 12/31/2024    Final                    Value:[facility]  [address] [place]  [address]  _______________________________________________________________________________  Patient Name: Nanette Burnett             Procedure Date: 12/31/2024 10:24 AM  MRN: [id number]                       Account Number: [id number]  YOB: 1970               Admit Type: Outpatient  Age: 54                               Room: [place] [address]  Note Status: Finalized                Attending MD: JALIL SPRINGER DO,   Instrument Name: 522 [id number] Adult Colon   _______________________________________________________________________________     Procedure:                Colonoscopy  Indications:              Clinically significant diarrhea of unexplained                             origin  Providers:                JALIL SPRINGER DO, Jana Razo RN  Referring MD:             KODI BOLTON MD  Medicines:                Monitored Anesthesia Care  Complications:            No immediate complications.  ___________________                          ____________________________________________________________  Procedure:                Pre-Anesthesia Assessment:                            - The risks and benefits of the procedure and the                             sedation options and risks were discussed with the                             patient. All questions were answered and informed                             consent was obtained.                            - Patient identification and proposed procedure                              were verified prior to the procedure by the                             physician, the nurse and the anesthetist. The                             procedure was verified in the procedure room.                            After obtaining informed consent, the colonoscope                             was passed under direct vision. Throughout the                             procedure, the patient's blood pressure, pulse, and                             oxygen saturations were monitored continuously                          . The                             522 was introduced through the anus and advanced to                             the terminal ileum. The colonoscopy was performed                             without difficulty. The patient tolerated the                             procedure well. The quality of the bowel                             preparation was good.                                                                                   Findings:       The terminal ileum appeared normal.       The entire examined colon appeared normal. Biopsies for histology were        taken with a cold forceps from the entire colon for evaluation of        microscopic colitis.                                                                                   Impression:               - The examined portion of the ileum was normal.                            - The entire examined colon is normal.                            - No specimens collected.  Recommendation:           - Await pathology results.                                                                                                               Jalil Barros DO  _____________________  JALIL BARROS DO  12/31/2024 11:19:08 AM  I was physically present for the entire viewing portion of the exam.  JALIL BARROS DO  Number of Addenda: 0    Note Initiated On: 12/31/2024 10:24 AM  Scope Withdrawal Time: 0 hours 6 minutes 27  seconds   Total Procedure Duration: 0 hours 12 minutes 40 seconds   Estimated Blood Loss:       Estimated blood loss: none.  Scope In: 10:47:24 AM  Scope Out: 11:00:04 AM           Phone call duration: 12 minutes  Signed Electronically by: Claudia Erickson MD

## 2025-01-30 DIAGNOSIS — I10 HYPERTENSION GOAL BP (BLOOD PRESSURE) < 140/90: ICD-10-CM

## 2025-01-30 RX ORDER — AMLODIPINE BESYLATE 5 MG/1
5 TABLET ORAL DAILY
Qty: 30 TABLET | Refills: 2 | Status: SHIPPED | OUTPATIENT
Start: 2025-01-30

## 2025-03-04 ENCOUNTER — PATIENT OUTREACH (OUTPATIENT)
Dept: CARE COORDINATION | Facility: CLINIC | Age: 55
End: 2025-03-04
Payer: COMMERCIAL

## 2025-03-12 ENCOUNTER — TRANSFERRED RECORDS (OUTPATIENT)
Dept: HEALTH INFORMATION MANAGEMENT | Facility: CLINIC | Age: 55
End: 2025-03-12
Payer: COMMERCIAL

## 2025-04-01 ENCOUNTER — PATIENT OUTREACH (OUTPATIENT)
Dept: CARE COORDINATION | Facility: CLINIC | Age: 55
End: 2025-04-01
Payer: COMMERCIAL

## 2025-04-03 SDOH — HEALTH STABILITY: PHYSICAL HEALTH: ON AVERAGE, HOW MANY MINUTES DO YOU ENGAGE IN EXERCISE AT THIS LEVEL?: 30 MIN

## 2025-04-03 SDOH — HEALTH STABILITY: PHYSICAL HEALTH: ON AVERAGE, HOW MANY DAYS PER WEEK DO YOU ENGAGE IN MODERATE TO STRENUOUS EXERCISE (LIKE A BRISK WALK)?: 3 DAYS

## 2025-04-03 ASSESSMENT — SOCIAL DETERMINANTS OF HEALTH (SDOH): HOW OFTEN DO YOU GET TOGETHER WITH FRIENDS OR RELATIVES?: TWICE A WEEK

## 2025-04-05 DIAGNOSIS — F41.1 ANXIETY STATE: ICD-10-CM

## 2025-04-07 RX ORDER — HYDROXYZINE HYDROCHLORIDE 25 MG/1
TABLET, FILM COATED ORAL
Qty: 30 TABLET | Refills: 5 | Status: SHIPPED | OUTPATIENT
Start: 2025-04-07

## 2025-04-08 ENCOUNTER — ANCILLARY PROCEDURE (OUTPATIENT)
Dept: GENERAL RADIOLOGY | Facility: CLINIC | Age: 55
End: 2025-04-08
Attending: FAMILY MEDICINE
Payer: COMMERCIAL

## 2025-04-08 ENCOUNTER — OFFICE VISIT (OUTPATIENT)
Dept: FAMILY MEDICINE | Facility: CLINIC | Age: 55
End: 2025-04-08
Payer: COMMERCIAL

## 2025-04-08 VITALS
HEART RATE: 74 BPM | OXYGEN SATURATION: 99 % | DIASTOLIC BLOOD PRESSURE: 87 MMHG | RESPIRATION RATE: 20 BRPM | HEIGHT: 69 IN | WEIGHT: 195.2 LBS | TEMPERATURE: 97.4 F | BODY MASS INDEX: 28.91 KG/M2 | SYSTOLIC BLOOD PRESSURE: 133 MMHG

## 2025-04-08 DIAGNOSIS — M79.645 PAIN OF LEFT THUMB: ICD-10-CM

## 2025-04-08 DIAGNOSIS — Z13.6 CARDIOVASCULAR SCREENING; LDL GOAL LESS THAN 160: ICD-10-CM

## 2025-04-08 DIAGNOSIS — I10 HYPERTENSION GOAL BP (BLOOD PRESSURE) < 140/90: ICD-10-CM

## 2025-04-08 DIAGNOSIS — G43.109 MIGRAINE WITH AURA AND WITHOUT STATUS MIGRAINOSUS, NOT INTRACTABLE: ICD-10-CM

## 2025-04-08 DIAGNOSIS — Z00.00 ROUTINE GENERAL MEDICAL EXAMINATION AT A HEALTH CARE FACILITY: Primary | ICD-10-CM

## 2025-04-08 DIAGNOSIS — K21.00 GASTROESOPHAGEAL REFLUX DISEASE WITH ESOPHAGITIS, UNSPECIFIED WHETHER HEMORRHAGE: ICD-10-CM

## 2025-04-08 DIAGNOSIS — F41.1 ANXIETY STATE: ICD-10-CM

## 2025-04-08 DIAGNOSIS — R43.9 TASTE DISORDER: ICD-10-CM

## 2025-04-08 LAB
ANION GAP SERPL CALCULATED.3IONS-SCNC: 10 MMOL/L (ref 7–15)
BUN SERPL-MCNC: 15.6 MG/DL (ref 6–20)
CALCIUM SERPL-MCNC: 9.4 MG/DL (ref 8.8–10.4)
CHLORIDE SERPL-SCNC: 106 MMOL/L (ref 98–107)
CHOLEST SERPL-MCNC: 204 MG/DL
CREAT SERPL-MCNC: 0.82 MG/DL (ref 0.51–0.95)
CREAT UR-MCNC: 177 MG/DL
EGFRCR SERPLBLD CKD-EPI 2021: 85 ML/MIN/1.73M2
FASTING STATUS PATIENT QL REPORTED: NO
FASTING STATUS PATIENT QL REPORTED: NO
GLUCOSE SERPL-MCNC: 93 MG/DL (ref 70–99)
HCO3 SERPL-SCNC: 27 MMOL/L (ref 22–29)
HDLC SERPL-MCNC: 100 MG/DL
LDLC SERPL CALC-MCNC: 94 MG/DL
MICROALBUMIN UR-MCNC: <12 MG/L
MICROALBUMIN/CREAT UR: NORMAL MG/G{CREAT}
NONHDLC SERPL-MCNC: 104 MG/DL
POTASSIUM SERPL-SCNC: 5.1 MMOL/L (ref 3.4–5.3)
SODIUM SERPL-SCNC: 143 MMOL/L (ref 135–145)
TRIGL SERPL-MCNC: 52 MG/DL
VIT B12 SERPL-MCNC: 361 PG/ML (ref 232–1245)

## 2025-04-08 PROCEDURE — 3079F DIAST BP 80-89 MM HG: CPT | Performed by: FAMILY MEDICINE

## 2025-04-08 PROCEDURE — 82043 UR ALBUMIN QUANTITATIVE: CPT | Performed by: FAMILY MEDICINE

## 2025-04-08 PROCEDURE — 82570 ASSAY OF URINE CREATININE: CPT | Performed by: FAMILY MEDICINE

## 2025-04-08 PROCEDURE — 1125F AMNT PAIN NOTED PAIN PRSNT: CPT | Performed by: FAMILY MEDICINE

## 2025-04-08 PROCEDURE — 82607 VITAMIN B-12: CPT | Performed by: FAMILY MEDICINE

## 2025-04-08 PROCEDURE — 80061 LIPID PANEL: CPT | Performed by: FAMILY MEDICINE

## 2025-04-08 PROCEDURE — 80048 BASIC METABOLIC PNL TOTAL CA: CPT | Performed by: FAMILY MEDICINE

## 2025-04-08 PROCEDURE — 99214 OFFICE O/P EST MOD 30 MIN: CPT | Mod: 25 | Performed by: FAMILY MEDICINE

## 2025-04-08 PROCEDURE — 73140 X-RAY EXAM OF FINGER(S): CPT | Mod: TC | Performed by: INTERNAL MEDICINE

## 2025-04-08 PROCEDURE — 99396 PREV VISIT EST AGE 40-64: CPT | Performed by: FAMILY MEDICINE

## 2025-04-08 PROCEDURE — 36415 COLL VENOUS BLD VENIPUNCTURE: CPT | Performed by: FAMILY MEDICINE

## 2025-04-08 PROCEDURE — 3075F SYST BP GE 130 - 139MM HG: CPT | Performed by: FAMILY MEDICINE

## 2025-04-08 RX ORDER — BUPROPION HYDROCHLORIDE 150 MG/1
150 TABLET ORAL EVERY MORNING
Qty: 90 TABLET | Refills: 2 | Status: SHIPPED | OUTPATIENT
Start: 2025-04-08

## 2025-04-08 RX ORDER — ESCITALOPRAM OXALATE 10 MG/1
10 TABLET ORAL DAILY
Qty: 90 TABLET | Refills: 2 | Status: SHIPPED | OUTPATIENT
Start: 2025-04-08

## 2025-04-08 RX ORDER — OMEPRAZOLE 40 MG/1
CAPSULE, DELAYED RELEASE ORAL
COMMUNITY
Start: 2025-01-13

## 2025-04-08 RX ORDER — AMLODIPINE BESYLATE 5 MG/1
5 TABLET ORAL DAILY
Qty: 90 TABLET | Refills: 4 | Status: SHIPPED | OUTPATIENT
Start: 2025-04-08

## 2025-04-08 RX ORDER — RIZATRIPTAN BENZOATE 10 MG/1
10 TABLET, ORALLY DISINTEGRATING ORAL
Qty: 12 TABLET | Refills: 3 | Status: SHIPPED | OUTPATIENT
Start: 2025-04-08

## 2025-04-08 RX ORDER — DICYCLOMINE HCL 20 MG
1 TABLET ORAL 4 TIMES DAILY PRN
COMMUNITY
Start: 2025-01-13

## 2025-04-08 ASSESSMENT — ANXIETY QUESTIONNAIRES
4. TROUBLE RELAXING: NOT AT ALL
GAD7 TOTAL SCORE: 5
1. FEELING NERVOUS, ANXIOUS, OR ON EDGE: SEVERAL DAYS
GAD7 TOTAL SCORE: 5
2. NOT BEING ABLE TO STOP OR CONTROL WORRYING: SEVERAL DAYS
3. WORRYING TOO MUCH ABOUT DIFFERENT THINGS: SEVERAL DAYS
IF YOU CHECKED OFF ANY PROBLEMS ON THIS QUESTIONNAIRE, HOW DIFFICULT HAVE THESE PROBLEMS MADE IT FOR YOU TO DO YOUR WORK, TAKE CARE OF THINGS AT HOME, OR GET ALONG WITH OTHER PEOPLE: SOMEWHAT DIFFICULT
7. FEELING AFRAID AS IF SOMETHING AWFUL MIGHT HAPPEN: NOT AT ALL
8. IF YOU CHECKED OFF ANY PROBLEMS, HOW DIFFICULT HAVE THESE MADE IT FOR YOU TO DO YOUR WORK, TAKE CARE OF THINGS AT HOME, OR GET ALONG WITH OTHER PEOPLE?: SOMEWHAT DIFFICULT
6. BECOMING EASILY ANNOYED OR IRRITABLE: MORE THAN HALF THE DAYS
GAD7 TOTAL SCORE: 5
5. BEING SO RESTLESS THAT IT IS HARD TO SIT STILL: NOT AT ALL
7. FEELING AFRAID AS IF SOMETHING AWFUL MIGHT HAPPEN: NOT AT ALL

## 2025-04-08 ASSESSMENT — PAIN SCALES - GENERAL: PAINLEVEL_OUTOF10: MILD PAIN (2)

## 2025-04-08 NOTE — PATIENT INSTRUCTIONS
Patient Education   Preventive Care Advice   This is general advice given by our system to help you stay healthy. However, your care team may have specific advice just for you. Please talk to your care team about your preventive care needs.  Nutrition  Eat 5 or more servings of fruits and vegetables each day.  Try wheat bread, brown rice and whole grain pasta (instead of white bread, rice, and pasta).  Get enough calcium and vitamin D. Check the label on foods and aim for 100% of the RDA (recommended daily allowance).  Lifestyle  Exercise at least 150 minutes each week  (30 minutes a day, 5 days a week).  Do muscle strengthening activities 2 days a week. These help control your weight and prevent disease.  No smoking.  Wear sunscreen to prevent skin cancer.  Have a dental exam and cleaning every 6 months.  Yearly exams  See your health care team every year to talk about:  Any changes in your health.  Any medicines your care team has prescribed.  Preventive care, family planning, and ways to prevent chronic diseases.  Shots (vaccines)   HPV shots (up to age 26), if you've never had them before.  Hepatitis B shots (up to age 59), if you've never had them before.  COVID-19 shot: Get this shot when it's due.  Flu shot: Get a flu shot every year.  Tetanus shot: Get a tetanus shot every 10 years.  Pneumococcal, hepatitis A, and RSV shots: Ask your care team if you need these based on your risk.  Shingles shot (for age 50 and up)  General health tests  Diabetes screening:  Starting at age 35, Get screened for diabetes at least every 3 years.  If you are younger than age 35, ask your care team if you should be screened for diabetes.  Cholesterol test: At age 39, start having a cholesterol test every 5 years, or more often if advised.  Bone density scan (DEXA): At age 50, ask your care team if you should have this scan for osteoporosis (brittle bones).  Hepatitis C: Get tested at least once in your life.  STIs (sexually  transmitted infections)  Before age 24: Ask your care team if you should be screened for STIs.  After age 24: Get screened for STIs if you're at risk. You are at risk for STIs (including HIV) if:  You are sexually active with more than one person.  You don't use condoms every time.  You or a partner was diagnosed with a sexually transmitted infection.  If you are at risk for HIV, ask about PrEP medicine to prevent HIV.  Get tested for HIV at least once in your life, whether you are at risk for HIV or not.  Cancer screening tests  Cervical cancer screening: If you have a cervix, begin getting regular cervical cancer screening tests starting at age 21.  Breast cancer scan (mammogram): If you've ever had breasts, begin having regular mammograms starting at age 40. This is a scan to check for breast cancer.  Colon cancer screening: It is important to start screening for colon cancer at age 45.  Have a colonoscopy test every 10 years (or more often if you're at risk) Or, ask your provider about stool tests like a FIT test every year or Cologuard test every 3 years.  To learn more about your testing options, visit:   .  For help making a decision, visit:   https://bit.ly/fn85061.  Prostate cancer screening test: If you have a prostate, ask your care team if a prostate cancer screening test (PSA) at age 55 is right for you.  Lung cancer screening: If you are a current or former smoker ages 50 to 80, ask your care team if ongoing lung cancer screenings are right for you.  For informational purposes only. Not to replace the advice of your health care provider. Copyright   2023 [facility]. All rights reserved. Clinically reviewed by the [facility]. Storie 438670 - REV 01/24.  Learning About Stress  What is stress?     Stress is your body's response to a hard situation. Your body can have a physical, emotional, or mental response. Stress is a fact of life for most people, and it  affects everyone differently. What causes stress for you may not be stressful for someone else.  A lot of things can cause stress. You may feel stress when you go on a job interview, take a test, or run a race. This kind of short-term stress is normal and even useful. It can help you if you need to work hard or react quickly. For example, stress can help you finish an important job on time.  Long-term stress is caused by ongoing stressful situations or events. Examples of long-term stress include long-term health problems, ongoing problems at work, or conflicts in your family. Long-term stress can harm your health.  How does stress affect your health?  When you are stressed, your body responds as though you are in danger. It makes hormones that speed up your heart, make you breathe faster, and give you a burst of energy. This is called the fight-or-flight stress response. If the stress is over quickly, your body goes back to normal and no harm is done.  But if stress happens too often or lasts too long, it can have bad effects. Long-term stress can make you more likely to get sick, and it can make symptoms of some diseases worse. If you tense up when you are stressed, you may develop neck, shoulder, or low back pain. Stress is linked to high blood pressure and heart disease.  Stress also harms your emotional health. It can make you simon, tense, or depressed. Your relationships may suffer, and you may not do well at work or school.  What can you do to manage stress?  You can try these things to help manage stress:   Do something active. Exercise or activity can help reduce stress. Walking is a great way to get started. Even everyday activities such as housecleaning or yard work can help.  Try yoga or timothy chi. These techniques combine exercise and meditation. You may need some training at first to learn them.  Do something you enjoy. For example, listen to music or go to a movie. Practice your hobby or do volunteer  "work.  Meditate. This can help you relax, because you are not worrying about what happened before or what may happen in the future.  Do guided imagery. Imagine yourself in any setting that helps you feel calm. You can use online videos, books, or a teacher to guide you.  Do breathing exercises. For example:  From a standing position, bend forward from the waist with your knees slightly bent. Let your arms dangle close to the floor.  Breathe in slowly and deeply as you return to a standing position. Roll up slowly and lift your head last.  Hold your breath for just a few seconds in the standing position.  Breathe out slowly and bend forward from the waist.  Let your feelings out. Talk, laugh, cry, and express anger when you need to. Talking with supportive friends or family, a counselor, or a lisa leader about your feelings is a healthy way to relieve stress. Avoid discussing your feelings with people who make you feel worse.  Write. It may help to write about things that are bothering you. This helps you find out how much stress you feel and what is causing it. When you know this, you can find better ways to cope.  What can you do to prevent stress?  You might try some of these things to help prevent stress:  Manage your time. This helps you find time to do the things you want and need to do.  Get enough sleep. Your body recovers from the stresses of the day while you are sleeping.  Get support. Your family, friends, and community can make a difference in how you experience stress.  Limit your news feed. Avoid or limit time on social media or news that may make you feel stressed.  Do something active. Exercise or activity can help reduce stress. Walking is a great way to get started.  Where can you learn more?  Go to https://www.[employer]net/patiented  Enter N032 in the search box to learn more about \"Learning About Stress.\"  Current as of: October 24, 2024  Content Version: 14.4 2024-2025 [employer]   Care instructions adapted under license by your healthcare professional. If you have questions about a medical condition or this instruction, always ask your healthcare professional. [employer] disclaims any warranty or liability for your use of this information.

## 2025-04-08 NOTE — PROGRESS NOTES
Preventive Care Visit  [facility]  Claudia Erickson MD, Family Medicine  Apr 8, 2025      Assessment & Plan     Routine general medical examination at a health care facility    - PRIMARY CARE FOLLOW-UP SCHEDULING  - PRIMARY CARE FOLLOW-UP SCHEDULING  - Lipid panel reflex to direct LDL Fasting    Hypertension goal BP (blood pressure) < 140/90  Under control  Continue  Refills per epicare    - amLODIPine (NORVASC) 5 MG tablet  Dispense: 90 tablet; Refill: 4  - Basic metabolic panel  (Ca, Cl, CO2, Creat, Gluc, K, Na, BUN)  - Albumin Random Urine Quantitative with Creat Ratio    Anxiety state  Stable  Continue  Refills per epicare    - buPROPion (WELLBUTRIN XL) 150 MG 24 hr tablet  Dispense: 90 tablet; Refill: 2  - escitalopram (LEXAPRO) 10 MG tablet  Dispense: 90 tablet; Refill: 2    Migraine with aura and without status migrainosus, not intractable  Under control  Refills per epicare    - rizatriptan (MAXALT-MLT) 10 MG ODT  Dispense: 12 tablet; Refill: 3    Pain of left thumb  This is new and at [facility] - wonder about DJD  If xray does not show anything dramatic, will refer to sports medicine    - XR Finger Left G/E 2 Views    CARDIOVASCULAR SCREENING; LDL GOAL LESS THAN 160    - Lipid panel reflex to direct LDL Fasting    Gastroesophageal reflux disease with esophagitis, unspecified whether hemorrhage  Has not started omeprazole yet  Discussed using pepcid prn as option too    - dicyclomine (BENTYL) 20 MG tablet  - omeprazole (PRILOSEC) 40 MG DR capsule  - Vitamin B12    Taste disorder  Off and on   Checking B12 and could be from gastroesophageal reflux disease       Patient has been advised of split billing requirements and indicates understanding: Yes        Counseling  Appropriate preventive services were addressed with this patient via screening, questionnaire, or discussion as appropriate for fall prevention, nutrition, physical activity, Tobacco-use cessation, social engagement, weight  loss and cognition.  Checklist reviewing preventive services available has been given to the patient.  Reviewed patient's diet, addressing concerns and/or questions.   She is at risk for lack of exercise and has been provided with information to increase physical activity for the benefit of her well-being.   She is at risk for psychosocial distress and has been provided with information to reduce risk.       FUTURE APPOINTMENTS:       - Follow-up visit in one year   Regular exercise  See Patient Instructions    Devon Fitzpatrick is a 54 year old, presenting for the following:   she is here for physical.    She also is here for med check on her meds for anxiety and blood pressure.   She is not having side effects.   She would like to continue.    She also has been having left thumb pain for the last few months.    When getting hand massage it really hurt and at times pressure in the area is tender.    She did not have an injury that she can remember.    She also notes an off and on bad taste in her mouth.      She was rx omeprazole on video call a few months ago but has not tried it yet.   Physical and Pain (Pain in left thumb - at its worse 9/10 )        4/8/2025     7:43 AM   Additional Questions   Roomed by diann cox   Accompanied by self          Pain      See above      Pain History:  When did you first notice your pain? A few months   Have you seen anyone else for your pain? No  How has your pain affected your ability to work? Not applicable  Where in your body do you have pain? Musculoskeletal problem/pain  Onset/Duration: few months  Description  Location: thumb - left  Joint Swelling: YES  Redness: No  Pain: YES  Warmth: No  Intensity:  moderate, severe  Progression of Symptoms:  intermittent  Accompanying signs and symptoms:   Fevers: No  Numbness/tingling/weakness: No  History  Trauma to the area: No  Recent illness:  No  Previous similar problem: No  Previous evaluation:  No  Precipitating or alleviating  factors:  Aggravating factors include: none  Therapies tried and outcome: nothing      Advance Care Planning  Patient does not have a Health Care Directive: discussed in 2024        4/3/2025   General Health   How would you rate your overall physical health? Good   Feel stress (tense, anxious, or unable to sleep) To some extent   (!) STRESS CONCERN      4/3/2025   Nutrition   Three or more servings of calcium each day? Yes   Diet: Regular (no restrictions)   How many servings of fruit and vegetables per day? (!) 0-1   How many sweetened beverages each day? 0-1         4/3/2025   Exercise   Days per week of moderate/strenous exercise 3 days   Average minutes spent exercising at this level 30 min         4/3/2025   Social Factors   Frequency of gathering with friends or relatives Twice a week   Worry food won't last until get money to buy more No   Food not last or not have enough money for food? No   Do you have housing? (Housing is defined as stable permanent housing and does not include staying ouside in a car, in a tent, in an abandoned building, in an overnight shelter, or couch-surfing.) Yes   Are you worried about losing your housing? No   Lack of transportation? No   Unable to get utilities (heat,electricity)? No         4/3/2025   Fall Risk   Fallen 2 or more times in the past year? No   Trouble with walking or balance? No          4/3/2025   Dental   Dentist two times every year? Yes           3/27/2024   TB Screening   Were you born outside of the [place]? No           Today's PHQ-2 Score:       4/8/2025     7:43 AM   PHQ-2 ( 1999 [employer])   Q1: Little interest or pleasure in doing things 0   Q2: Feeling down, depressed or hopeless 0   PHQ-2 Score 0    Q1: Little interest or pleasure in doing things Not at all   Q2: Feeling down, depressed or hopeless Not at all   PHQ-2 Score 0       Patient-reported           4/3/2025   Substance Use   Alcohol more than 3/day or more than 7/wk No   Do you use any other  substances recreationally? No     Social History     Tobacco Use    Smoking status: Never    Smokeless tobacco: Never    Tobacco comments:     occasionally in the past - social   Vaping Use    Vaping status: Never Used   Substance Use Topics    Alcohol use: Yes     Alcohol/week: 0.0 - 1.0 standard drinks of alcohol     Comment: 1 drink monthly    Drug use: No           4/3/2024   LAST FHS-7 RESULTS   1st degree relative breast or ovarian cancer Yes   Any relative bilateral breast cancer No   Any male have breast cancer No   Any ONE woman have BOTH breast AND ovarian cancer No   Any woman with breast cancer before 50yrs No   2 or more relatives with breast AND/OR ovarian cancer Yes   2 or more relatives with breast AND/OR bowel cancer No        Mammogram Screening - Mammogram every 1-2 years updated in Health Maintenance based on mutual decision making        4/3/2025   STI Screening   New sexual partner(s) since last STI/HIV test? No     History of abnormal Pap smear: No - age 30-64 HPV with reflex Pap every 5 years recommended        Latest Ref Rng & Units 4/3/2024    10:06 AM 12/8/2021     8:51 AM 8/2/2018     3:57 PM   PAP / HPV   PAP  Negative for Intraepithelial Lesion or Malignancy (NILM)  Negative for Intraepithelial Lesion or Malignancy (NILM)     PAP (Historical)    NIL    HPV 16 DNA Negative Negative  Negative     HPV 18 DNA Negative Negative  Negative     Other HR HPV Negative Negative  Negative       ASCVD Risk   The ASCVD Risk score (An BESS, et al., 2019) failed to calculate for the following reasons:    The valid HDL cholesterol range is 20 to 100 mg/dL           Reviewed and updated as needed this visit by Provider                    Labs reviewed in EPIC  BP Readings from Last 3 Encounters:   04/08/25 133/87   12/31/24 117/80   12/24/24 123/78    Wt Readings from Last 3 Encounters:   04/08/25 88.5 kg (195 lb 3.2 oz)   12/31/24 87.1 kg (192 lb)   12/24/24 87.5 kg (192 lb 12.8 oz)                   Patient Active Problem List   Diagnosis    Herpes simplex virus (HSV) infection    Anxiety state    Allergic state    External hemorrhoids    CARDIOVASCULAR SCREENING; LDL GOAL LESS THAN 160    Dysplasia of cervix    Migraine    Anemia, unspecified type    Hypertension goal BP (blood pressure) < 140/90    Rosenthal's neuroma of left foot    Impingement syndrome of right shoulder     Past Surgical History:   Procedure Laterality Date    ABDOMEN SURGERY      ARTHROSCOPY KNEE Right 2016    lateral and medial meniscal tear    C/SECTION, LOW TRANSVERSE  2005    , Low Transverse    C/SECTION, LOW TRANSVERSE  2008    COLONOSCOPY      repeat in 10 years    COLONOSCOPY N/A 2024    rpt 10 yrs  COLONOSCOPY, WITH POLYPECTOMY AND BIOPSY;  Surgeon: Jalil Springer DO;  Location:  GI    ESOPHAGOSCOPY, GASTROSCOPY, DUODENOSCOPY (EGD), COMBINED N/A 2024    Procedure: ESOPHAGOGASTRODUODENOSCOPY, WITH BIOPSY;  Surgeon: Jalil Springer DO;  Location:  GI    HERNIORRHAPHY INGUINAL  2012    Procedure: HERNIORRHAPHY INGUINAL;;  Surgeon: Tray Valenzuela MD;  Location: [facility] OR    LAPAROSCOPIC TUBAL LIGATION  2012    Procedure: LAPAROSCOPIC TUBAL LIGATION;  LAPAROSCOPIC TUBAL LIGATION, Right Inguinal Hernia Repair with Mesh ;  Surgeon: Gerson Burrell MD;  Location: [facility] OR    [facility] NONSPECIFIC PROCEDURE  age 6    s/p tonsillectomy & adenoidectomy    [facility] NONSPECIFIC PROCEDURE  1994    L knee surgery ACL-arthroscopic/open    [facility] NONSPECIFIC PROCEDURE  1995    vein stripping x 2    Z NONSPECIFIC PROCEDURE  1997    R ACL surgery    [facility] NONSPECIFIC PROCEDURE  1995    s/p L inguinal hernia repair    Z NONSPECIFIC PROCEDURE  1998    abnormal pap-TCA    Z NONSPECIFIC PROCEDURE  2002    bunion surgery L    [facility] NONSPECIFIC PROCEDURE  2001    Bilateral saphenous vein       Social History     Tobacco Use    Smoking status: Never     Smokeless tobacco: Never    Tobacco comments:     occasionally in the past - social   Substance Use Topics    Alcohol use: Yes     Alcohol/week: 0.0 - 1.0 standard drinks of alcohol     Comment: 1 drink monthly     Family History   Problem Relation Age of Onset    Cancer Other         Prostate CA    Hypertension Father     Neurologic Disorder Father         ALS    Thyroid Disease Mother         actually hypoglycemia; not thyroid.    Hypertension Mother     Breast Cancer Mother         Was taken care of with radiation    Hypertension Maternal Grandmother     Cerebrovascular Disease Maternal Grandmother     Cancer Maternal Grandmother         ovarian/uterine dx at age 50's    Breast Cancer Paternal Grandmother         dx at age 60's         Current Outpatient Medications   Medication Sig Dispense Refill    amLODIPine (NORVASC) 5 MG tablet Take 1 tablet (5 mg) by mouth daily. 90 tablet 4    buPROPion (WELLBUTRIN XL) 150 MG 24 hr tablet Take 1 tablet (150 mg) by mouth every morning. 90 tablet 2    dicyclomine (BENTYL) 20 MG tablet Take 1 tablet by mouth 4 times daily as needed.      escitalopram (LEXAPRO) 10 MG tablet Take 1 tablet (10 mg) by mouth daily. 90 tablet 2    hydrOXYzine HCl (ATARAX) 25 MG tablet TAKE 1 TABLET(25 MG) BY MOUTH EVERY NIGHT AS NEEDED FOR ANXIETY OR SLEEP 30 tablet 5    multivitamin, therapeutic (THERA-VIT) TABS tablet Take 1 tablet by mouth daily.      omeprazole (PRILOSEC) 40 MG DR capsule take 1 capsule by mouth every day before a meal      rizatriptan (MAXALT-MLT) 10 MG ODT Take 1 tablet (10 mg) by mouth at onset of headache for migraine. May repeat in 2 hours. Max 3 tablets/24 hours. 12 tablet 3    valACYclovir (VALTREX) 500 MG tablet Take 1 tablet (500 mg) by mouth 2 times daily. For 3 days with flare 12 tablet 3     Allergies   Allergen Reactions    Ace Inhibitors Angioedema     11/5/24 Dr. Sacha Berrios: Detailed discussion during allergy consult. Lisinopril likely culprit of lip  "angioedema. Would recommend all ACE inhibitors be avoided, and instead patient be started on calcium antagonist, ARB, diuretic, or as a last resort, ARB.         Review of Systems  Constitutional, HEENT, cardiovascular, pulmonary, gi and gu systems are negative, except as otherwise noted.     Objective    Exam  /87 (BP Location: Right arm, Patient Position: Chair, Cuff Size: Adult Large)   Pulse 74   Temp 97.4  F (36.3  C) (Temporal)   Resp 20   Ht 1.753 m (5' 9\")   Wt 88.5 kg (195 lb 3.2 oz)   SpO2 99%   BMI 28.83 kg/m     Estimated body mass index is 28.83 kg/m  as calculated from the following:    Height as of this encounter: 1.753 m (5' 9\").    Weight as of this encounter: 88.5 kg (195 lb 3.2 oz).    Physical Exam  GENERAL: alert and no distress  EYES: Eyes grossly normal to inspection, PERRL and conjunctivae and sclerae normal  HENT: ear canals and TM's normal, nose and mouth without ulcers or lesions  NECK: no adenopathy, no asymmetry, masses, or scars  RESP: lungs clear to auscultation - no rales, rhonchi or wheezes  BREAST: normal without masses, tenderness or nipple discharge and no palpable axillary masses or adenopathy  CV: regular rate and rhythm, normal S1 S2, no S3 or S4, no murmur, click or rub, no peripheral edema  ABDOMEN: soft, nontender, no hepatosplenomegaly, no masses and bowel sounds normal  MS: varicose veins bilateral lower legs , also tenderness at 1st MCP joint of left hand - no visible swelling or redness or heat   SKIN: no suspicious lesions or rashes  NEURO: Normal strength and tone, mentation intact and speech normal  PSYCH: mentation appears normal, affect normal/bright  LYMPH: no cervical, supraclavicular, axillary, or inguinal adenopathy        Signed Electronically by: Claudia Erickson MD    Answers submitted by the patient for this visit:  Patient Health Questionnaire (G7) (Submitted on 4/8/2025)  HAO 7 TOTAL SCORE: 5    "

## 2025-04-10 ENCOUNTER — ANCILLARY PROCEDURE (OUTPATIENT)
Dept: MAMMOGRAPHY | Facility: CLINIC | Age: 55
End: 2025-04-10
Attending: FAMILY MEDICINE
Payer: COMMERCIAL

## 2025-04-10 DIAGNOSIS — Z12.31 VISIT FOR SCREENING MAMMOGRAM: ICD-10-CM

## 2025-06-09 DIAGNOSIS — B00.9 HERPES SIMPLEX VIRUS (HSV) INFECTION: ICD-10-CM

## 2025-06-09 RX ORDER — VALACYCLOVIR HYDROCHLORIDE 500 MG/1
TABLET, FILM COATED ORAL
Qty: 12 TABLET | Refills: 3 | Status: SHIPPED | OUTPATIENT
Start: 2025-06-09

## 2025-07-24 ENCOUNTER — OFFICE VISIT (OUTPATIENT)
Dept: CARDIOLOGY | Facility: CLINIC | Age: 55
End: 2025-07-24
Attending: FAMILY MEDICINE
Payer: COMMERCIAL

## 2025-07-24 VITALS
WEIGHT: 201.3 LBS | OXYGEN SATURATION: 99 % | DIASTOLIC BLOOD PRESSURE: 88 MMHG | HEART RATE: 75 BPM | SYSTOLIC BLOOD PRESSURE: 126 MMHG | HEIGHT: 69 IN | BODY MASS INDEX: 29.82 KG/M2

## 2025-07-24 DIAGNOSIS — R42 LIGHTHEADEDNESS: ICD-10-CM

## 2025-07-24 DIAGNOSIS — I47.10 SVT (SUPRAVENTRICULAR TACHYCARDIA): Primary | ICD-10-CM

## 2025-07-24 DIAGNOSIS — I86.8 VARICOSE VEINS OF BOTH UPPER EXTREMITIES: ICD-10-CM

## 2025-07-24 NOTE — LETTER
"7/24/2025    Claudia Erickson MD  [id number]  [id number]    RE: Nanette Burnett       Dear Colleague,     I had the pleasure of seeing Nanette Burnett in the [facility].  CARDIAC ELECTROPHYSIOLOGY CLINIC NOTE:  This visit was completed in person at the [facility].      I had the pleasure of seeing Ms. Nanette Burnett for evaluation of palpitation/SVT. She has been referred to EP by Tatiana Sawyer PA-C.    Very pleasant 55-year-old female with h/o hypertension who has dealt with episodes of weakness/lightheadedness for a few years.    Today she mentioned that her more \"major episodes\" have resolved after switching from lisinopril to amlodipine last November. They involved tingling in her lips and sometimes in her arms, facial flushing, feeling hot in her neck, lightheadedness and the urge to lie down. They would often be triggered by bowel movements and could last up to 30 minutes.     She also mentioned more recent \"minor episodes\" that involve a sensation of heart racing and mild lightheadedness. She can feel and sometimes see her heart pound very hard in her chest. She has not had an event lasting over 1 minute.    For further evaluation she completed a 14-day cardiac monitor in 11/2024 which I reviewed today: it showed 20 SVT runs, longest lasting 24 seconds. Otherwise, rare PACs and PVCs. One brief SVT event was noted by the patient.    She's also bothered by venous varicosities that she has had for many years. This caused some tingling, burning and edema.    Social history: she works for an [employer] firm. , two children. Non-smoker, minimal alcohol, moderate caffeine consumption.    Family history: negative for premature heart disease or sudden death.      PHYSICAL EXAMINATION:  Vital signs: one 6/88, 75, 91.3 kg, BMI 29.7  General:  in no apparent distress  ENT/Mouth:  no nasal discharge.  Eyes:  normal conjunctivae.   Neck:  no thyromegaly or " lymphadenopathy.  Chest/Lungs:  patient is not dyspneic.  Lungs CTA, without rales or wheezing  Cardiovascular:  nl JVP, rhythm is regular.  No gallop, murmur or rub.    Abdomen:  no abdominal distention.  Soft, negative HJR.    Extremities:  no edema  Skin:  no xanthelasma.    Neurologic:  alert & oriented x 3.  No tremor.    Vascular:  2+ carotids without bruits.  2+ radials.        DIAGNOSTIC STUDIES (reviewed/interpreted in the clinic today):  Laboratory studies: sodium 143, potassium 5.1, creatinine 0.82, calcium 9.4, cholesterol 204, , LDL 94, no recent TSH.  ECG: SR, RSR' in V1, nonspecific T-wave abnormality.  Echocardiogram:  N/A.       IMPRESSION:  Brief SVT episodes. These were well documented on her recent cardiac monitor. Most likely represent non-sustained AVNRT or AVRT. I reviewed the pathophysiology of SVT with Ms. Burnett. I reassured her it is generally an innocent condition, especially in her case as her events are brief and not particularly fast.  A reasonable treatment option would involve switching amlodipine to diltiazem since the latter can treat both HTN and NSVT. At the moment, the patient prefers to stay on amlodipine but will call back if she wishes to switch to diltiazem in the future.  EP study and catheter ablation can be considered later on, if SVT frequency/duration increase.  Symptomatic venous varicosities. I will order a venous reflux study and refer to my vascular colleagues.    RECOMMENDATIONS:  Reassurance.  LE ultrasound and referral to Dr. Beasley.  Follow-up with EP as needed.    It was my pleasure seeing this delightful patient.  Please feel free to call with any questions.     Usman Swanson MD      (Chart documentation was completed, in part, using Dragon voice-recognition software. The note was reviewed, however grammatical and spelling errors may be present.)      CURRENT MEDICATIONS:  Current Outpatient Medications   Medication Sig Dispense Refill     amLODIPine  (NORVASC) 5 MG tablet Take 1 tablet (5 mg) by mouth daily. 90 tablet 4     buPROPion (WELLBUTRIN XL) 150 MG 24 hr tablet Take 1 tablet (150 mg) by mouth every morning. 90 tablet 2     dicyclomine (BENTYL) 20 MG tablet Take 1 tablet by mouth 4 times daily as needed.       escitalopram (LEXAPRO) 10 MG tablet Take 1 tablet (10 mg) by mouth daily. 90 tablet 2     hydrOXYzine HCl (ATARAX) 25 MG tablet TAKE 1 TABLET(25 MG) BY MOUTH EVERY NIGHT AS NEEDED FOR ANXIETY OR SLEEP 30 tablet 5     multivitamin, therapeutic (THERA-VIT) TABS tablet Take 1 tablet by mouth daily.       omeprazole (PRILOSEC) 40 MG DR capsule take 1 capsule by mouth every day before a meal       rizatriptan (MAXALT-MLT) 10 MG ODT Take 1 tablet (10 mg) by mouth at onset of headache for migraine. May repeat in 2 hours. Max 3 tablets/24 hours. 12 tablet 3     valACYclovir (VALTREX) 500 MG tablet TAKE 1 TABLET BY MOUTH TWICE DAILY FOR 3 DAYS WITH FLARES. 12 tablet 3       ALLERGIES     Allergies   Allergen Reactions     Ace Inhibitors Angioedema     11/5/24 Dr. Sacha Berrios: Detailed discussion during allergy consult. Lisinopril likely culprit of lip angioedema. Would recommend all ACE inhibitors be avoided, and instead patient be started on calcium antagonist, ARB, diuretic, or as a last resort, ARB.       PAST MEDICAL HISTORY:  Past Medical History:   Diagnosis Date     Allergy, unspecified not elsewhere classified     Hayfever, better in the early 2000's     Anxiety state, unspecified      Dysplastic nevus     near toe. another suspicious mole right thigh. sees Derm - both removed     Genital herpes, unspecified     about 2 - 4 flares per year     Hypertension      Impingement syndrome of right shoulder 02/20/2023     Infection due to 2019 novel coronavirus 11/2021     Insomnia, unspecified      Migraine, unspecified, without mention of intractable migraine without mention of status migrainosus     will have some with aura; has others     SVT  (supraventricular tachycardia)        PAST SURGICAL HISTORY:  Past Surgical History:   Procedure Laterality Date     ABDOMEN SURGERY       ARTHROSCOPY KNEE Right 2016    lateral and medial meniscal tear     C/SECTION, LOW TRANSVERSE  2005    , Low Transverse     C/SECTION, LOW TRANSVERSE  2008     COLONOSCOPY      repeat in 10 years     COLONOSCOPY N/A 2024    rpt 10 yrs  COLONOSCOPY, WITH POLYPECTOMY AND BIOPSY;  Surgeon: Jalil Springer DO;  Location:  GI     ESOPHAGOSCOPY, GASTROSCOPY, DUODENOSCOPY (EGD), COMBINED N/A 2024    Procedure: ESOPHAGOGASTRODUODENOSCOPY, WITH BIOPSY;  Surgeon: Jalil Springer DO;  Location:  GI     HERNIORRHAPHY INGUINAL  2012    Procedure: HERNIORRHAPHY INGUINAL;;  Surgeon: Tray Valenzuela MD;  Location:  OR     LAPAROSCOPIC TUBAL LIGATION  2012    Procedure: LAPAROSCOPIC TUBAL LIGATION;  LAPAROSCOPIC TUBAL LIGATION, Right Inguinal Hernia Repair with Mesh ;  Surgeon: Gerson Burrell MD;  Location: [facility] OR     ZZC NONSPECIFIC PROCEDURE  age 6    s/p tonsillectomy & adenoidectomy     ZZC NONSPECIFIC PROCEDURE  1994    L knee surgery ACL-arthroscopic/open     ZZC NONSPECIFIC PROCEDURE  1995    vein stripping x 2     ZZC NONSPECIFIC PROCEDURE  1997    R ACL surgery     ZZC NONSPECIFIC PROCEDURE  1995    s/p L inguinal hernia repair     ZZC NONSPECIFIC PROCEDURE  1998    abnormal pap-TCA     ZZC NONSPECIFIC PROCEDURE  2002    bunion surgery L     ZZC NONSPECIFIC PROCEDURE  2001    Bilateral saphenous vein       FAMILY HISTORY:  Family History   Problem Relation Age of Onset     Cancer Other         Prostate CA     Hypertension Father      Neurologic Disorder Father         ALS     Thyroid Disease Mother         actually hypoglycemia; not thyroid.     Hypertension Mother      Breast Cancer Mother         Was taken care of with radiation     Hypertension Maternal Grandmother       Cerebrovascular Disease Maternal Grandmother      Cancer Maternal Grandmother         ovarian/uterine dx at age 50's     Breast Cancer Paternal Grandmother         dx at age 60's       SOCIAL HISTORY:  Social History     Socioeconomic History     Marital status:      Spouse name: Brooks     Number of children: 1   Occupational History     Occupation:  firm     Comment: part time     Employer: [employer]   Tobacco Use     Smoking status: Never     Smokeless tobacco: Never     Tobacco comments:     occasionally in the past - social   Vaping Use     Vaping status: Never Used   Substance and Sexual Activity     Alcohol use: Yes     Alcohol/week: 0.0 - 1.0 standard drinks of alcohol     Comment: 1 drink monthly     Drug use: No     Sexual activity: Yes     Partners: Male     Birth control/protection: None     Comment: condom; TL   Other Topics Concern     Special Diet No     Comment: Watches some.  Avoids fast food.  OK with dairy.     Exercise Yes     Comment: treadmill in cold weather. tiw     Parent/sibling w/ CABG, MI or angioplasty before 65F 55M? No     Social Drivers of Health     Financial Resource Strain: Low Risk  (4/3/2025)    Financial Resource Strain      Within the past 12 months, have you or your family members you live with been unable to get utilities (heat, electricity) when it was really needed?: No   Food Insecurity: Low Risk  (4/3/2025)    Food Insecurity      Within the past 12 months, did you worry that your food would run out before you got money to buy more?: No      Within the past 12 months, did the food you bought just not last and you didn t have money to get more?: No   Transportation Needs: Low Risk  (4/3/2025)    Transportation Needs      Within the past 12 months, has lack of transportation kept you from medical appointments, getting your medicines, non-medical meetings or appointments, work, or from getting things that you need?: No   Physical Activity:  Insufficiently Active (4/3/2025)    Exercise Vital Sign      Days of Exercise per Week: 3 days      Minutes of Exercise per Session: 30 min   Stress: Stress Concern Present (4/3/2025)    [place] [place] [employer] - Occupational Stress Questionnaire      Feeling of Stress : To some extent   Social Connections: Unknown (4/3/2025)    Social Connection and Isolation Panel [NHANES]      Frequency of Social Gatherings with Friends and Family: Twice a week   Interpersonal Safety: Low Risk  (4/8/2025)    Interpersonal Safety      Do you feel physically and emotionally safe where you currently live?: Yes      Within the past 12 months, have you been hit, slapped, kicked or otherwise physically hurt by someone?: No      Within the past 12 months, have you been humiliated or emotionally abused in other ways by your partner or ex-partner?: No   Housing Stability: Low Risk  (4/3/2025)    Housing Stability      Do you have housing? : Yes      Are you worried about losing your housing?: No           CC  Claudia Erickson MD  [address] [place] [address]                  Thank you for allowing me to participate in the care of your patient.      Sincerely,     Usman Swanson MD     [facility]  cc:   Claudia Erickson MD  [id number] [phone]

## 2025-07-24 NOTE — PROGRESS NOTES
"CARDIAC ELECTROPHYSIOLOGY CLINIC NOTE:  This visit was completed in person at the [facility].      I had the pleasure of seeing Ms. Nanette Burnett for evaluation of palpitation/SVT. She has been referred to EP by Tatiana Sawyer PA-C.    Very pleasant 55-year-old female with h/o hypertension who has dealt with episodes of weakness/lightheadedness for a few years.    Today she mentioned that her more \"major episodes\" have resolved after switching from lisinopril to amlodipine last November. They involved tingling in her lips and sometimes in her arms, facial flushing, feeling hot in her neck, lightheadedness and the urge to lie down. They would often be triggered by bowel movements and could last up to 30 minutes.     She also mentioned more recent \"minor episodes\" that involve a sensation of heart racing and mild lightheadedness. She can feel and sometimes see her heart pound very hard in her chest. She has not had an event lasting over 1 minute.    For further evaluation she completed a 14-day cardiac monitor in 11/2024 which I reviewed today: it showed 20 SVT runs, longest lasting 24 seconds. Otherwise, rare PACs and PVCs. One brief SVT event was noted by the patient.    She's also bothered by venous varicosities that she has had for many years. This caused some tingling, burning and edema.    Social history: she works for an engineering firm. , two children. Non-smoker, minimal alcohol, moderate caffeine consumption.    Family history: negative for premature heart disease or sudden death.      PHYSICAL EXAMINATION:  Vital signs: one 6/88, 75, 91.3 kg, BMI 29.7  General:  in no apparent distress  ENT/Mouth:  no nasal discharge.  Eyes:  normal conjunctivae.   Neck:  no thyromegaly or lymphadenopathy.  Chest/Lungs:  patient is not dyspneic.  Lungs CTA, without rales or wheezing  Cardiovascular:  nl JVP, rhythm is regular.  No gallop, murmur or rub.    Abdomen:  no abdominal distention.  Soft, " negative HJR.    Extremities:  no edema  Skin:  no xanthelasma.    Neurologic:  alert & oriented x 3.  No tremor.    Vascular:  2+ carotids without bruits.  2+ radials.        DIAGNOSTIC STUDIES (reviewed/interpreted in the clinic today):  Laboratory studies: sodium 143, potassium 5.1, creatinine 0.82, calcium 9.4, cholesterol 204, , LDL 94, no recent TSH.  ECG: SR, RSR' in V1, nonspecific T-wave abnormality.  Echocardiogram:  N/A.       IMPRESSION:  Brief SVT episodes. These were well documented on her recent cardiac monitor. Most likely represent non-sustained AVNRT or AVRT. I reviewed the pathophysiology of SVT with Ms. Burnett. I reassured her it is generally an innocent condition, especially in her case as her events are brief and not particularly fast.  A reasonable treatment option would involve switching amlodipine to diltiazem since the latter can treat both HTN and NSVT. At the moment, the patient prefers to stay on amlodipine but will call back if she wishes to switch to diltiazem in the future.  EP study and catheter ablation can be considered later on, if SVT frequency/duration increase.  Symptomatic venous varicosities. I will order a venous reflux study and refer to my vascular colleagues.    RECOMMENDATIONS:  Reassurance.  LE ultrasound and referral to Dr. Beasley.  Follow-up with EP as needed.    It was my pleasure seeing this delightful patient.  Please feel free to call with any questions.     Usman Swanson MD      (Chart documentation was completed, in part, using Dragon voice-recognition software. The note was reviewed, however grammatical and spelling errors may be present.)      CURRENT MEDICATIONS:  Current Outpatient Medications   Medication Sig Dispense Refill    amLODIPine (NORVASC) 5 MG tablet Take 1 tablet (5 mg) by mouth daily. 90 tablet 4    buPROPion (WELLBUTRIN XL) 150 MG 24 hr tablet Take 1 tablet (150 mg) by mouth every morning. 90 tablet 2    dicyclomine (BENTYL) 20 MG  tablet Take 1 tablet by mouth 4 times daily as needed.      escitalopram (LEXAPRO) 10 MG tablet Take 1 tablet (10 mg) by mouth daily. 90 tablet 2    hydrOXYzine HCl (ATARAX) 25 MG tablet TAKE 1 TABLET(25 MG) BY MOUTH EVERY NIGHT AS NEEDED FOR ANXIETY OR SLEEP 30 tablet 5    multivitamin, therapeutic (THERA-VIT) TABS tablet Take 1 tablet by mouth daily.      omeprazole (PRILOSEC) 40 MG DR capsule take 1 capsule by mouth every day before a meal      rizatriptan (MAXALT-MLT) 10 MG ODT Take 1 tablet (10 mg) by mouth at onset of headache for migraine. May repeat in 2 hours. Max 3 tablets/24 hours. 12 tablet 3    valACYclovir (VALTREX) 500 MG tablet TAKE 1 TABLET BY MOUTH TWICE DAILY FOR 3 DAYS WITH FLARES. 12 tablet 3       ALLERGIES     Allergies   Allergen Reactions    Ace Inhibitors Angioedema     11/5/24 Dr. Sacha Berrios: Detailed discussion during allergy consult. Lisinopril likely culprit of lip angioedema. Would recommend all ACE inhibitors be avoided, and instead patient be started on calcium antagonist, ARB, diuretic, or as a last resort, ARB.       PAST MEDICAL HISTORY:  Past Medical History:   Diagnosis Date    Allergy, unspecified not elsewhere classified     Hayfever, better in the early 2000's    Anxiety state, unspecified     Dysplastic nevus     near toe. another suspicious mole right thigh. sees Derm - both removed    Genital herpes, unspecified     about 2 - 4 flares per year    Hypertension     Impingement syndrome of right shoulder 02/20/2023    Infection due to 2019 novel coronavirus 11/2021    Insomnia, unspecified     Migraine, unspecified, without mention of intractable migraine without mention of status migrainosus     will have some with aura; has others    SVT (supraventricular tachycardia)        PAST SURGICAL HISTORY:  Past Surgical History:   Procedure Laterality Date    ABDOMEN SURGERY      ARTHROSCOPY KNEE Right 06/01/2016    lateral and medial meniscal tear    C/SECTION, LOW TRANSVERSE   2005    , Low Transverse    C/SECTION, LOW TRANSVERSE  2008    COLONOSCOPY      repeat in 10 years    COLONOSCOPY N/A 2024    rpt 10 yrs  COLONOSCOPY, WITH POLYPECTOMY AND BIOPSY;  Surgeon: Jalil Springer DO;  Location:  GI    ESOPHAGOSCOPY, GASTROSCOPY, DUODENOSCOPY (EGD), COMBINED N/A 2024    Procedure: ESOPHAGOGASTRODUODENOSCOPY, WITH BIOPSY;  Surgeon: Jalil Springer DO;  Location:  GI    HERNIORRHAPHY INGUINAL  2012    Procedure: HERNIORRHAPHY INGUINAL;;  Surgeon: Tray Valenzuela MD;  Location:  OR    LAPAROSCOPIC TUBAL LIGATION  2012    Procedure: LAPAROSCOPIC TUBAL LIGATION;  LAPAROSCOPIC TUBAL LIGATION, Right Inguinal Hernia Repair with Mesh ;  Surgeon: Gerson Burrell MD;  Location:  OR    Z NONSPECIFIC PROCEDURE  age 6    s/p tonsillectomy & adenoidectomy    [facility] NONSPECIFIC PROCEDURE  1994    L knee surgery ACL-arthroscopic/open    Z NONSPECIFIC PROCEDURE  1995    vein stripping x 2    Z NONSPECIFIC PROCEDURE  1997    R ACL surgery    ZZC NONSPECIFIC PROCEDURE  1995    s/p L inguinal hernia repair    ZZ NONSPECIFIC PROCEDURE  1998    abnormal pap-TCA    Z NONSPECIFIC PROCEDURE  2002    bunion surgery L    [facility] NONSPECIFIC PROCEDURE  2001    Bilateral saphenous vein       FAMILY HISTORY:  Family History   Problem Relation Age of Onset    Cancer Other         Prostate CA    Hypertension Father     Neurologic Disorder Father         ALS    Thyroid Disease Mother         actually hypoglycemia; not thyroid.    Hypertension Mother     Breast Cancer Mother         Was taken care of with radiation    Hypertension Maternal Grandmother     Cerebrovascular Disease Maternal Grandmother     Cancer Maternal Grandmother         ovarian/uterine dx at age 50's    Breast Cancer Paternal Grandmother         dx at age 60's       SOCIAL HISTORY:  Social History     Socioeconomic History    Marital  status:      Spouse name: Brooks    Number of children: 1   Occupational History    Occupation:  firm     Comment: part time     Employer: [employer]   Tobacco Use    Smoking status: Never    Smokeless tobacco: Never    Tobacco comments:     occasionally in the past - social   Vaping Use    Vaping status: Never Used   Substance and Sexual Activity    Alcohol use: Yes     Alcohol/week: 0.0 - 1.0 standard drinks of alcohol     Comment: 1 drink monthly    Drug use: No    Sexual activity: Yes     Partners: Male     Birth control/protection: None     Comment: condom; TL   Other Topics Concern    Special Diet No     Comment: Watches some.  Avoids fast food.  OK with dairy.    Exercise Yes     Comment: treadmill in cold weather. tiw    Parent/sibling w/ CABG, MI or angioplasty before 65F 55M? No     Social Drivers of Health     Financial Resource Strain: Low Risk  (4/3/2025)    Financial Resource Strain     Within the past 12 months, have you or your family members you live with been unable to get utilities (heat, electricity) when it was really needed?: No   Food Insecurity: Low Risk  (4/3/2025)    Food Insecurity     Within the past 12 months, did you worry that your food would run out before you got money to buy more?: No     Within the past 12 months, did the food you bought just not last and you didn t have money to get more?: No   Transportation Needs: Low Risk  (4/3/2025)    Transportation Needs     Within the past 12 months, has lack of transportation kept you from medical appointments, getting your medicines, non-medical meetings or appointments, work, or from getting things that you need?: No   Physical Activity: Insufficiently Active (4/3/2025)    Exercise Vital Sign     Days of Exercise per Week: 3 days     Minutes of Exercise per Session: 30 min   Stress: Stress Concern Present (4/3/2025)    [place] [employer] - Occupational Stress Questionnaire     Feeling of  Stress : To some extent   Social Connections: Unknown (4/3/2025)    Social Connection and Isolation Panel [NHANES]     Frequency of Social Gatherings with Friends and Family: Twice a week   Interpersonal Safety: Low Risk  (4/8/2025)    Interpersonal Safety     Do you feel physically and emotionally safe where you currently live?: Yes     Within the past 12 months, have you been hit, slapped, kicked or otherwise physically hurt by someone?: No     Within the past 12 months, have you been humiliated or emotionally abused in other ways by your partner or ex-partner?: No   Housing Stability: Low Risk  (4/3/2025)    Housing Stability     Do you have housing? : Yes     Are you worried about losing your housing?: No           CC  Claudia Erickson MD  [id number] [place] [address]

## 2025-07-31 ENCOUNTER — TRANSFERRED RECORDS (OUTPATIENT)
Dept: HEALTH INFORMATION MANAGEMENT | Facility: CLINIC | Age: 55
End: 2025-07-31
Payer: COMMERCIAL

## 2025-08-26 ENCOUNTER — TELEPHONE (OUTPATIENT)
Dept: CARDIOLOGY | Facility: CLINIC | Age: 55
End: 2025-08-26
Payer: COMMERCIAL

## 2025-08-26 DIAGNOSIS — I47.10 SVT (SUPRAVENTRICULAR TACHYCARDIA): Primary | ICD-10-CM

## 2025-08-27 RX ORDER — DILTIAZEM HYDROCHLORIDE 180 MG/1
180 CAPSULE, EXTENDED RELEASE ORAL DAILY
Qty: 30 CAPSULE | Refills: 11 | Status: SHIPPED | OUTPATIENT
Start: 2025-08-27